# Patient Record
Sex: MALE | Race: WHITE | NOT HISPANIC OR LATINO | Employment: UNEMPLOYED | URBAN - METROPOLITAN AREA
[De-identification: names, ages, dates, MRNs, and addresses within clinical notes are randomized per-mention and may not be internally consistent; named-entity substitution may affect disease eponyms.]

---

## 2017-12-06 ENCOUNTER — ALLSCRIPTS OFFICE VISIT (OUTPATIENT)
Dept: OTHER | Facility: OTHER | Age: 57
End: 2017-12-06

## 2017-12-06 DIAGNOSIS — Z00.00 ENCOUNTER FOR GENERAL ADULT MEDICAL EXAMINATION WITHOUT ABNORMAL FINDINGS: ICD-10-CM

## 2017-12-06 DIAGNOSIS — E29.1 TESTICULAR HYPOFUNCTION: ICD-10-CM

## 2017-12-06 DIAGNOSIS — E78.00 PURE HYPERCHOLESTEROLEMIA: ICD-10-CM

## 2017-12-12 ENCOUNTER — TRANSCRIBE ORDERS (OUTPATIENT)
Dept: LAB | Facility: CLINIC | Age: 57
End: 2017-12-12

## 2017-12-12 ENCOUNTER — APPOINTMENT (OUTPATIENT)
Dept: LAB | Facility: CLINIC | Age: 57
End: 2017-12-12
Payer: MEDICARE

## 2017-12-12 ENCOUNTER — GENERIC CONVERSION - ENCOUNTER (OUTPATIENT)
Dept: OTHER | Facility: OTHER | Age: 57
End: 2017-12-12

## 2017-12-12 DIAGNOSIS — E29.1 TESTICULAR HYPOFUNCTION: ICD-10-CM

## 2017-12-12 DIAGNOSIS — E78.00 PURE HYPERCHOLESTEROLEMIA: ICD-10-CM

## 2017-12-12 DIAGNOSIS — Z00.00 ENCOUNTER FOR GENERAL ADULT MEDICAL EXAMINATION WITHOUT ABNORMAL FINDINGS: ICD-10-CM

## 2017-12-12 LAB
ALBUMIN SERPL BCP-MCNC: 4 G/DL (ref 3.5–5)
ALP SERPL-CCNC: 78 U/L (ref 46–116)
ALT SERPL W P-5'-P-CCNC: 32 U/L (ref 12–78)
ANION GAP SERPL CALCULATED.3IONS-SCNC: 5 MMOL/L (ref 4–13)
AST SERPL W P-5'-P-CCNC: 20 U/L (ref 5–45)
BILIRUB SERPL-MCNC: 0.95 MG/DL (ref 0.2–1)
BUN SERPL-MCNC: 13 MG/DL (ref 5–25)
CALCIUM SERPL-MCNC: 8.9 MG/DL (ref 8.3–10.1)
CHLORIDE SERPL-SCNC: 103 MMOL/L (ref 100–108)
CHOLEST SERPL-MCNC: 259 MG/DL (ref 50–200)
CO2 SERPL-SCNC: 29 MMOL/L (ref 21–32)
CREAT SERPL-MCNC: 1.03 MG/DL (ref 0.6–1.3)
GFR SERPL CREATININE-BSD FRML MDRD: 81 ML/MIN/1.73SQ M
GLUCOSE P FAST SERPL-MCNC: 100 MG/DL (ref 65–99)
HDLC SERPL-MCNC: 35 MG/DL (ref 40–60)
LDLC SERPL CALC-MCNC: 197 MG/DL (ref 0–100)
POTASSIUM SERPL-SCNC: 3.7 MMOL/L (ref 3.5–5.3)
PROT SERPL-MCNC: 7.6 G/DL (ref 6.4–8.2)
SODIUM SERPL-SCNC: 137 MMOL/L (ref 136–145)
TESTOST SERPL-MCNC: 356 NG/DL (ref 95–948)
TRIGL SERPL-MCNC: 137 MG/DL
TSH SERPL DL<=0.05 MIU/L-ACNC: 1.07 UIU/ML (ref 0.36–3.74)

## 2017-12-12 PROCEDURE — 84403 ASSAY OF TOTAL TESTOSTERONE: CPT

## 2017-12-12 PROCEDURE — 80061 LIPID PANEL: CPT

## 2017-12-12 PROCEDURE — 80053 COMPREHEN METABOLIC PANEL: CPT

## 2017-12-12 PROCEDURE — 36415 COLL VENOUS BLD VENIPUNCTURE: CPT

## 2017-12-12 PROCEDURE — 84443 ASSAY THYROID STIM HORMONE: CPT

## 2018-01-13 NOTE — PROGRESS NOTES
Assessment    1  Encounter for preventive health examination (V70 0) (Z00 00)    Plan  Health Maintenance, Testicular hypogonadism    · (1) COMPREHENSIVE METABOLIC PANEL; Status:Active; Requested for:84Oqy1989;    · (1) LIPID PANEL, FASTING; Status:Active; Requested for:41Pnp6841;    · (1) TESTOSTERONE; Status:Active; Requested for:99Zqg3954;   Hypercholesterolemia    · Vytorin 10-10 MG Oral Tablet; 1 every day    Discussion/Summary  Impression: health maintenance visit  Currently, he eats a poor diet and has an inadequate exercise regimen  Colorectal cancer screening: colorectal cancer screening is current  The patient declines immunizations and UTD with PCV - 4 y ago by pulmonologist, declined Adacel  Rto prn  The treatment plan was reviewed with the patient/guardian  The patient/guardian understands and agrees with the treatment plan      Chief Complaint  Annual PE  ksd,cma      History of Present Illness  HM, Adult Male: The patient is being seen for a health maintenance evaluation  The last health maintenance visit was 2 year(s) ago  General Health: The patient's health since the last visit is described as good  He has regular dental visits  He complains of vision problems  Vision care includes wearing glasses and an eye examination more than a year ago  He denies hearing loss  Immunizations status: not up to date The patient needs the following immunization(s): tetanus vaccine  Lifestyle:  He consumes a diverse and healthy diet  He does not have any weight concerns  He does not exercise regularly  He does not use tobacco  He denies alcohol use  He denies drug use  Screening:      Review of Systems    Constitutional: No fever or chills, feels well, no tiredness, no recent weight gain or weight loss  Eyes: No complaints of eye pain, no red eyes, no discharge from eyes, no itchy eyes     ENT: no complaints of earache, no hearing loss, no nosebleeds, no nasal discharge, no sore throat, no hoarseness  Cardiovascular: No complaints of slow heart rate, no fast heart rate, no chest pain, no palpitations, no leg claudication, no lower extremity  Respiratory: No complaints of shortness of breath, no wheezing, no cough, no SOB on exertion, no orthopnea or PND  Gastrointestinal: No complaints of abdominal pain, no constipation, no nausea or vomiting, no diarrhea or bloody stools  Genitourinary: No complaints of dysuria, no incontinence, no hesitancy, no nocturia, no genital lesion, no testicular pain  Musculoskeletal: No complaints of arthralgia, no myalgias, no joint swelling or stiffness, no limb pain or swelling  Integumentary: No complaints of skin rash or skin lesions, no itching, no skin wound, no dry skin  Neurological: No compliants of headache, no confusion, no convulsions, no numbness or tingling, no dizziness or fainting, no limb weakness, no difficulty walking  Psychiatric: Is not suicidal, no sleep disturbances, no anxiety or depression, no change in personality, no emotional problems  Endocrine: No complaints of proptosis, no hot flashes, no muscle weakness, no erectile dysfunction, no deepening of the voice, no feelings of weakness  Hematologic/Lymphatic: No complaints of swollen glands, no swollen glands in the neck, does not bleed easily, no easy bruising  Active Problems    1  Asthma (493 90) (J45 909)   2  Benign prostatic hypertrophy without urinary obstruction (600 00) (N40 0)   3  Encounter for prostate cancer screening (V76 44) (Z12 5)   4  Encounter for screening colonoscopy (V76 51) (Z12 11)   5   Hypercholesterolemia (272 0) (E78 00)    Surgical History    · History of Knee Arthroplasty   · History of Sinus Surgery    Family History  Mother    · Family history of Dementia   · Family history of Kidney disease  Father    · Family history of Dementia   · Family history of Rheumatic disease   · Family history of Rheumatism    Social History    · Former smoker (V15 82) (O19 353)    Current Meds   1  ProAir  (90 Base) MCG/ACT Inhalation Aerosol Solution; 2  PUFFS Q 4-6   HOURS PRN  CLIFF;   Therapy: 60LLN9306 to (Last Rx:92Xwe8944)  Requested for: 80Rsi5962 Ordered   2  Pulmicort Flexhaler 180 MCG/ACT Inhalation Aerosol Powder Breath Activated; two puffs   bid; Therapy: 85RXM0107 to (Last Rx:58Bav0526) Ordered   3  Vytorin 10-10 MG Oral Tablet; 1 every day; Therapy: 17OGV8251 to (Last Rx:77Fkg7912)  Requested for: 76Zit2678 Ordered    Allergies    1  No Known Drug Allergies    Vitals   Recorded: 90ZKT2961 01:10PM   Temperature 97 6 F   Heart Rate 72   Respiration 18   Systolic 875   Diastolic 66   Height 6 ft    Weight 210 lb    BMI Calculated 28 48   BSA Calculated 2 18     Physical Exam    Constitutional   General appearance: No acute distress, well appearing and well nourished  Eyes   Conjunctiva and lids: No erythema, swelling or discharge  Ears, Nose, Mouth, and Throat   Otoscopic examination: Tympanic membranes translucent with normal light reflex  Canals patent without erythema  Neck   Neck: Supple, symmetric, trachea midline, no masses  Thyroid: Normal, no thyromegaly  Pulmonary   Respiratory effort: No increased work of breathing or signs of respiratory distress  Auscultation of lungs: Clear to auscultation  Cardiovascular   Auscultation of heart: Normal rate and rhythm, normal S1 and S2, no murmurs  Examination of extremities for edema and/or varicosities: Normal     Abdomen   Abdomen: Non-tender, no masses  Lymphatic   Palpation of lymph nodes in neck: No lymphadenopathy  Musculoskeletal   Gait and station: Normal     Inspection/palpation of joints, bones, and muscles: Normal     Muscle strength/tone: Normal     Skin   Skin and subcutaneous tissue: Normal without rashes or lesions  Neurologic   Cranial nerves: Cranial nerves 2-12 intact      Psychiatric   Judgment and insight: Normal     Mood and affect: Normal  Results/Data  PHQ-9 Adult Depression Screening 83Gpj0373 01:15PM User, Dre     Test Name Result Flag Reference   PHQ-9 Adult Depression Score 6     Over the last two weeks, how often have you been bothered by any of the following problems? Little interest or pleasure in doing things: Several days - 1  Feeling down, depressed, or hopeless: Several days - 1  Trouble falling or staying asleep, or sleeping too much: Several days - 1  Feeling tired or having little energy: More than half the days - 2  Poor appetite or over eating: Not at all - 0  Feeling bad about yourself - or that you are a failure or have let yourself or your family down: Not at all - 0  Trouble concentrating on things, such as reading the newspaper or watching television: Several days - 1  Moving or speaking so slowly that other people could have noticed  Or the opposite -  being so fidgety or restless that you have been moving around a lot more than usual: Not at all - 0  Thoughts that you would be better off dead, or of hurting yourself in some way: Not at all - 0   PHQ-9 Adult Depression Screening Negative     PHQ-9 Difficulty Level Somewhat difficult     PHQ-9 Severity Mild Depression         Health Management  Encounter for screening colonoscopy   COLONOSCOPY; every 10 years; Last 39URK9491; Next Due: 29BPD9293;  Active    Signatures   Electronically signed by : TEJAS Roche ; Dec  6 2016  1:39PM EST                       (Author)

## 2018-01-15 NOTE — MISCELLANEOUS
Provider Comments  Provider Comments:   Wesley Valerio SPOKE W/WIFE WHO STATES PATIENT IS ON THE [facility] WHEN ARRIVES      Signatures   Electronically signed by : Beauford Felty, M D ; Nov 14 2016  9:52AM EST                       (Author)

## 2018-01-23 VITALS
WEIGHT: 205 LBS | HEART RATE: 72 BPM | RESPIRATION RATE: 16 BRPM | SYSTOLIC BLOOD PRESSURE: 120 MMHG | DIASTOLIC BLOOD PRESSURE: 80 MMHG | HEIGHT: 72 IN | BODY MASS INDEX: 27.77 KG/M2 | TEMPERATURE: 98 F

## 2018-01-23 NOTE — RESULT NOTES
Verified Results  (1) COMPREHENSIVE METABOLIC PANEL 87EMD8536 11:61XP Ana Workman Order Number: [id number]     Test Name Result Flag Reference   SODIUM 137 mmol/L  136-145   POTASSIUM 3 7 mmol/L  3 5-5 3   CHLORIDE 103 mmol/L  100-108   CARBON DIOXIDE 29 mmol/L  21-32   ANION GAP (CALC) 5 mmol/L  4-13   BLOOD UREA NITROGEN 13 mg/dL  5-25   CREATININE 1 03 mg/dL  0 60-1 30   Standardized to IDMS reference method   CALCIUM 8 9 mg/dL  8 3-10 1   BILI, TOTAL 0 95 mg/dL  0 20-1 00   ALK PHOSPHATAS 78 U/L     ALT (SGPT) 32 U/L  12-78   Specimen collection should occur prior to Sulfasalazine and/or Sulfapyridine administration due to the potential for falsely depressed results  AST(SGOT) 20 U/L  5-45   Specimen collection should occur prior to Sulfasalazine administration due to the potential for falsely depressed results  ALBUMIN 4 0 g/dL  3 5-5 0   TOTAL PROTEIN 7 6 g/dL  6 4-8 2   eGFR 81 ml/min/1 73sq m     National Kidney Disease Education Program recommendations are as follows:  GFR calculation is accurate only with a steady state creatinine  Chronic Kidney disease less than 60 ml/min/1 73 sq  meters  Kidney failure less than 15 ml/min/1 73 sq  meters  GLUCOSE FASTING 100 mg/dL H 65-99   Specimen collection should occur prior to Sulfasalazine administration due to the potential for falsely depressed results  Specimen collection should occur prior to Sulfapyridine administration due to the potential for falsely elevated results  (1) LIPID PANEL, FASTING 78Dtc9975 09:32AM Sharlette Epley    Order Number: [id number]     Test Name Result Flag Reference   CHOLESTEROL 259 mg/dL H    HDL,DIRECT 35 mg/dL L 40-60   Specimen collection should occur prior to Metamizole administration due to the potential for falsley depressed results     LDL CHOLESTEROL CALCULATED 197 mg/dL H 0-100   Triglyceride:        Normal <150 mg/dl   Borderline High 150-199 mg/dl   High 200-499 mg/dl   Very High >499 mg/dl      Cholesterol:       Desirable <200 mg/dl    Borderline High 200-239 mg/dl    High >239 mg/dl      HDL Cholesterol:       High>59 mg/dL    Low <41 mg/dL      This screening LDL is a calculated result  It does not have the accuracy of the Direct Measured LDL in the monitoring of patients with hyperlipidemia and/or statin therapy  Direct Measure LDL (NQV245) must be ordered separately in these patients  TRIGLYCERIDES 137 mg/dL  <=150   Specimen collection should occur prior to N-Acetylcysteine or Metamizole administration due to the potential for falsely depressed results  (1) TESTOSTERONE 23Gqb1976 09:32AM Taylor Peng   TW Order Number: [id number]     Test Name Result Flag Reference   TESTOSTERONE 356 0 ng/dL     *New Reference Range*     (1) TSH 96Kxu5123 09:32AM Taylor Peng   TW Order Number: [id number]     Test Name Result Flag Reference   TSH 1 070 uIU/mL  0 358-3 740   Patients undergoing fluorescein dye angiography may retain small amounts of fluorescein in the body for 48-72 hours post procedure  Samples containing fluorescein can produce falsely depressed TSH values  If the patient had this procedure,a specimen should be resubmitted post fluorescein clearance

## 2018-01-23 NOTE — PROGRESS NOTES
Assessment    1  Encounter for preventive health examination (V70 0) (Z00 00)   2  Hypercholesterolemia (272 0) (E78 00)   3  Testicular hypogonadism (257 2) (E29 1)    Plan  Health Maintenance    · Adacel 5-2-15 5 LF-MCG/0 5 Intramuscular Suspension  Health Maintenance, Hypercholesterolemia    · (1) COMPREHENSIVE METABOLIC PANEL; Status:Active; Requested for:80Qnd5722;    · (1) LIPID PANEL, FASTING; Status:Active; Requested for:68Aya4443;   Hypercholesterolemia    · Ezetimibe-Simvastatin 10-10 MG Oral Tablet (Vytorin); 1 every day   · (1) TSH; Status:Active; Requested for:39Upo0237; Testicular hypogonadism    · (1) TESTOSTERONE; Status:Active; Requested for:05Wpl4150;     Discussion/Summary  health maintenance visit Currently, he eats a healthy diet and has an inadequate exercise regimen  Prostate cancer screening: PSA is not indicated  Colorectal cancer screening: colorectal cancer screening is current  Screening lab work includes glucose and lipid profile  The patient declines immunizations and - Adacel  Advice and education were given regarding nutrition, aerobic exercise and weight bearing exercise  Patient discussion: discussed with the patient  H/o low testosterone in the past - never took med that was Rx - wants to recheck   rto prn  Chief Complaint  Patient presents for annual PE  Declines Tdap  nil/lpn      History of Present Illness  HM, Adult Male: The patient is being seen for a health maintenance evaluation  The last health maintenance visit was year(s) ago  General Health: He has regular dental visits  He complains of vision problems  Vision care includes wearing glasses and an eye examination more than a year ago  He denies hearing loss  Immunizations status: not up to date  Lifestyle:  He consumes a diverse and healthy diet  He does not have any weight concerns  He exercises regularly  He does not use tobacco  He denies alcohol use  He denies drug use     Screening:      Review of Systems    Constitutional: No fever or chills, feels well, no tiredness, no recent weight gain or weight loss  Eyes: No complaints of eye pain, no red eyes, no discharge from eyes, no itchy eyes  ENT: no complaints of earache, no hearing loss, no nosebleeds, no nasal discharge, no sore throat, no hoarseness  Cardiovascular: No complaints of slow heart rate, no fast heart rate, no chest pain, no palpitations, no leg claudication, no lower extremity  Respiratory: No complaints of shortness of breath, no wheezing, no cough, no SOB on exertion, no orthopnea or PND  Gastrointestinal: No complaints of abdominal pain, no constipation, no nausea or vomiting, no diarrhea or bloody stools  Genitourinary: No complaints of dysuria, no incontinence, no hesitancy, no nocturia, no genital lesion, no testicular pain  Musculoskeletal: No complaints of arthralgia, no myalgias, no joint swelling or stiffness, no limb pain or swelling  Integumentary: No complaints of skin rash or skin lesions, no itching, no skin wound, no dry skin  Neurological: No compliants of headache, no confusion, no convulsions, no numbness or tingling, no dizziness or fainting, no limb weakness, no difficulty walking  Psychiatric: Is not suicidal, no sleep disturbances, no anxiety or depression, no change in personality, no emotional problems  Endocrine: No complaints of proptosis, no hot flashes, no muscle weakness, no erectile dysfunction, no deepening of the voice, no feelings of weakness  Hematologic/Lymphatic: No complaints of swollen glands, no swollen glands in the neck, does not bleed easily, no easy bruising  Active Problems    1  Asthma (493 90) (J45 909)   2  Benign prostatic hypertrophy without urinary obstruction (600 00) (N40 0)   3  Encounter for prostate cancer screening (V76 44) (Z12 5)   4  Encounter for screening colonoscopy (V76 51) (Z12 11)   5  Hypercholesterolemia (272 0) (E78 00)   6   Testicular hypogonadism (257 2) (E29 1)    Past Medical History    · History of Acute bronchitis (466 0) (J20 9)   · History of Cough (786 2) (R05)   · History of fatigue (V13 89) (S96 857)   · History of Near syncope (780 2) (R55)    Surgical History    · History of Knee Arthroscopy (Therapeutic)   · History of Sinus Surgery    Family History  Mother    · Family history of Dementia   · Family history of Kidney disease  Father    · Family history of Dementia   · Family history of Rheumatic disease   · Family history of Rheumatism    Social History    · Former smoker (F10 02) (Y70 254)    Current Meds   1  ProAir  (90 Base) MCG/ACT Inhalation Aerosol Solution; 2  PUFFS Q 4-6   HOURS PRN  CLIFF;   Therapy: 87SAP0414 to (Last Rx:96Gze1884)  Requested for: 56Fda6756 Ordered   2  Pulmicort Flexhaler 180 MCG/ACT Inhalation Aerosol Powder Breath Activated; two puffs   bid; Therapy: 73YYK9758 to (Last Rx:72Lfa9129) Ordered   3  Vytorin 10-10 MG Oral Tablet; 1 every day; Therapy: 81RWB1321 to (Last Rx:99Wpw5926)  Requested for: 09LWV1434 Ordered    Allergies    1  No Known Drug Allergies    Vitals   Recorded: 14KHO4023 03:09PM   Temperature 98 F   Heart Rate 72   Respiration Quality Normal   Respiration 16   Systolic 634   Diastolic 80   Height 6 ft    Weight 205 lb    BMI Calculated 27 8   BSA Calculated 2 15     Physical Exam    Constitutional   General appearance: No acute distress, well appearing and well nourished  Head and Face   Head and face: Normal     Eyes   Conjunctiva and lids: No erythema, swelling or discharge  Pupils and irises: Equal, round, reactive to light  Ears, Nose, Mouth, and Throat   Otoscopic examination: Tympanic membranes translucent with normal light reflex  Canals patent without erythema  Oropharynx: Normal with no erythema, edema, exudate or lesions  Neck   Neck: Supple, symmetric, trachea midline, no masses  Thyroid: Normal, no thyromegaly      Pulmonary   Respiratory effort: No increased work of breathing or signs of respiratory distress  Auscultation of lungs: Clear to auscultation  Cardiovascular   Auscultation of heart: Normal rate and rhythm, normal S1 and S2, no murmurs  Examination of extremities for edema and/or varicosities: Normal     Abdomen   Abdomen: Non-tender, no masses  Genitourinary declined  Lymphatic   Palpation of lymph nodes in neck: No lymphadenopathy  Musculoskeletal   Gait and station: Normal     Inspection/palpation of joints, bones, and muscles: Normal     Muscle strength/tone: Normal     Skin   Skin and subcutaneous tissue: Normal without rashes or lesions  Neurologic   Cranial nerves: Cranial nerves 2-12 intact  Psychiatric   Judgment and insight: Normal     Mood and affect: Normal        Results/Data  PHQ-2 Adult Depression Screening 52Jtk4567 03:12PM User, s     Test Name Result Flag Reference   PHQ-2 Adult Depression Score 0     Over the last two weeks, how often have you been bothered by any of the following problems? Little interest or pleasure in doing things: Not at all - 0  Feeling down, depressed, or hopeless: Not at all - 0   PHQ-2 Adult Depression Screening Negative         Health Management  Encounter for screening colonoscopy   COLONOSCOPY; every 10 years; Last 51OTF6881; Next Due: 42JIZ7627;  Active    Signatures   Electronically signed by : TEJAS Vargas ; Dec  6 2017  5:01PM EST                       (Author)

## 2018-03-13 DIAGNOSIS — E78.00 PURE HYPERCHOLESTEROLEMIA: ICD-10-CM

## 2018-03-15 ENCOUNTER — TRANSCRIBE ORDERS (OUTPATIENT)
Dept: LAB | Facility: CLINIC | Age: 58
End: 2018-03-15

## 2018-03-15 ENCOUNTER — APPOINTMENT (OUTPATIENT)
Dept: LAB | Facility: CLINIC | Age: 58
End: 2018-03-15
Payer: MEDICARE

## 2018-03-15 DIAGNOSIS — E78.00 PURE HYPERCHOLESTEROLEMIA: ICD-10-CM

## 2018-03-15 LAB
ALBUMIN SERPL BCP-MCNC: 3.9 G/DL (ref 3.5–5)
ALP SERPL-CCNC: 77 U/L (ref 46–116)
ALT SERPL W P-5'-P-CCNC: 34 U/L (ref 12–78)
ANION GAP SERPL CALCULATED.3IONS-SCNC: 5 MMOL/L (ref 4–13)
AST SERPL W P-5'-P-CCNC: 20 U/L (ref 5–45)
BILIRUB SERPL-MCNC: 0.63 MG/DL (ref 0.2–1)
BUN SERPL-MCNC: 16 MG/DL (ref 5–25)
CALCIUM SERPL-MCNC: 8.8 MG/DL (ref 8.3–10.1)
CHLORIDE SERPL-SCNC: 105 MMOL/L (ref 100–108)
CHOLEST SERPL-MCNC: 259 MG/DL (ref 50–200)
CO2 SERPL-SCNC: 30 MMOL/L (ref 21–32)
CREAT SERPL-MCNC: 1.02 MG/DL (ref 0.6–1.3)
GFR SERPL CREATININE-BSD FRML MDRD: 82 ML/MIN/1.73SQ M
GLUCOSE P FAST SERPL-MCNC: 104 MG/DL (ref 65–99)
HDLC SERPL-MCNC: 39 MG/DL (ref 40–60)
LDLC SERPL CALC-MCNC: 193 MG/DL (ref 0–100)
POTASSIUM SERPL-SCNC: 4.1 MMOL/L (ref 3.5–5.3)
PROT SERPL-MCNC: 7.2 G/DL (ref 6.4–8.2)
SODIUM SERPL-SCNC: 140 MMOL/L (ref 136–145)
TRIGL SERPL-MCNC: 136 MG/DL

## 2018-03-15 PROCEDURE — 80061 LIPID PANEL: CPT

## 2018-03-15 PROCEDURE — 80053 COMPREHEN METABOLIC PANEL: CPT

## 2018-03-15 PROCEDURE — 36415 COLL VENOUS BLD VENIPUNCTURE: CPT

## 2018-03-16 ENCOUNTER — OFFICE VISIT (OUTPATIENT)
Dept: FAMILY MEDICINE CLINIC | Facility: CLINIC | Age: 58
End: 2018-03-16
Payer: MEDICARE

## 2018-03-16 VITALS
BODY MASS INDEX: 26.95 KG/M2 | RESPIRATION RATE: 18 BRPM | DIASTOLIC BLOOD PRESSURE: 80 MMHG | WEIGHT: 210 LBS | HEART RATE: 76 BPM | TEMPERATURE: 98.1 F | SYSTOLIC BLOOD PRESSURE: 130 MMHG | HEIGHT: 74 IN

## 2018-03-16 DIAGNOSIS — J45.909 UNCOMPLICATED ASTHMA, UNSPECIFIED ASTHMA SEVERITY, UNSPECIFIED WHETHER PERSISTENT: ICD-10-CM

## 2018-03-16 DIAGNOSIS — E78.00 HYPERCHOLESTEROLEMIA: Primary | ICD-10-CM

## 2018-03-16 PROCEDURE — 99214 OFFICE O/P EST MOD 30 MIN: CPT | Performed by: FAMILY MEDICINE

## 2018-03-16 RX ORDER — ROSUVASTATIN CALCIUM 10 MG/1
10 TABLET, COATED ORAL DAILY
Qty: 30 TABLET | Refills: 3 | Status: SHIPPED | OUTPATIENT
Start: 2018-03-16 | End: 2021-01-19 | Stop reason: SDUPTHER

## 2018-03-16 RX ORDER — EZETIMIBE AND SIMVASTATIN 10; 10 MG/1; MG/1
TABLET ORAL DAILY
COMMUNITY
Start: 2015-06-15 | End: 2018-03-16 | Stop reason: CLARIF

## 2018-03-16 NOTE — PATIENT INSTRUCTIONS
Heart Healthy Diet   WHAT YOU NEED TO KNOW:   A heart healthy diet is an eating plan low in total fat, unhealthy fats, and sodium (salt)  A heart healthy diet helps decrease your risk for heart disease and stroke  Limit the amount of fat you eat to 25% to 35% of your total daily calories  Limit sodium to less than 2,300 mg each day  DISCHARGE INSTRUCTIONS:   Healthy fats:  Healthy fats can help improve cholesterol levels  The risk for heart disease is decreased when cholesterol levels are normal  Choose healthy fats, such as the following:  · Unsaturated fat  is found in foods such as soybean, canola, olive, corn, and safflower oils  It is also found in soft tub margarine that is made with liquid vegetable oil  · Omega-3 fat  is found in certain fish, such as salmon, tuna, and trout, and in walnuts and flaxseed  Unhealthy fats:  Unhealthy fats can cause unhealthy cholesterol levels in your blood and increase your risk of heart disease  Limit unhealthy fats, such as the following:  · Cholesterol  is found in animal foods, such as eggs and lobster, and in dairy products made from whole milk  Limit cholesterol to less than 300 milligrams (mg) each day  You may need to limit cholesterol to 200 mg each day if you have heart disease  · Saturated fat  is found in meats, such as pedraza and hamburger  It is also found in chicken or turkey skin, whole milk, and butter  Limit saturated fat to less than 7% of your total daily calories  Limit saturated fat to less than 6% if you have heart disease or are at increased risk for it  · Trans fat  is found in packaged foods, such as potato chips and cookies  It is also in hard margarine, some fried foods, and shortening  Avoid trans fats as much as possible    Heart healthy foods and drinks to include:  Ask your dietitian or healthcare provider how many servings to have from each of the following food groups:  · Grains:      ¨ Whole-wheat breads, cereals, and pastas, and brown rice    ¨ Low-fat, low-sodium crackers and chips    · Vegetables:      ¨ Broccoli, green beans, green peas, and spinach    ¨ Collards, kale, and lima beans    ¨ Carrots, sweet potatoes, tomatoes, and peppers    ¨ Canned vegetables with no salt added    · Fruits:      ¨ Bananas, peaches, pears, and pineapple    ¨ Grapes, raisins, and dates    ¨ Oranges, tangerines, grapefruit, orange juice, and grapefruit juice    ¨ Apricots, mangoes, melons, and papaya    ¨ Raspberries and strawberries    ¨ Canned fruit with no added sugar    · Low-fat dairy products:      ¨ Nonfat (skim) milk, 1% milk, and low-fat almond, cashew, or soy milks fortified with calcium    ¨ Low-fat cheese, regular or frozen yogurt, and cottage cheese    · Meats and proteins , such as lean cuts of beef and pork (loin, leg, round), skinless chicken and turkey, legumes, soy products, egg whites, and nuts  Foods and drinks to limit or avoid:  Ask your dietitian or healthcare provider about these and other foods that are high in unhealthy fat, sodium, and sugar:  · Snack or packaged foods , such as frozen dinners, cookies, macaroni and cheese, and cereals with more than 300 mg of sodium per serving    · Canned or dry mixes  for cakes, soups, sauces, or gravies    · Vegetables with added sodium , such as instant potatoes, vegetables with added sauces, or regular canned vegetables    · Other foods high in sodium , such as ketchup, barbecue sauce, salad dressing, pickles, olives, soy sauce, and miso    · High-fat dairy foods  such as whole or 2% milk, cream cheese, or sour cream, and cheeses     · High-fat protein foods  such as high-fat cuts of beef (T-bone steaks, ribs), chicken or turkey with skin, and organ meats, such as liver    · Cured or smoked meats , such as hot dogs, pedraza, and sausage    · Unhealthy fats and oils , such as butter, stick margarine, shortening, and cooking oils such as coconut or palm oil    · Food and drinks high in sugar , such as soft drinks (soda), sports drinks, sweetened tea, candy, cake, cookies, pies, and doughnuts  Other diet guidelines to follow:   · Eat more foods containing omega-3 fats  Eat fish high in omega-3 fats at least 2 times a week  · Limit alcohol  Too much alcohol can damage your heart and raise your blood pressure  Women should limit alcohol to 1 drink a day  Men should limit alcohol to 2 drinks a day  A drink of alcohol is 12 ounces of beer, 5 ounces of wine, or 1½ ounces of liquor  · Choose low-sodium foods  High-sodium foods can lead to high blood pressure  Add little or no salt to food you prepare  Use herbs and spices in place of salt  · Eat more fiber  to help lower cholesterol levels  Eat at least 5 servings of fruits and vegetables each day  Eat 3 ounces of whole-grain foods each day  Legumes (beans) are also a good source of fiber  Lifestyle guidelines:   · Do not smoke  Nicotine and other chemicals in cigarettes and cigars can cause lung and heart damage  Ask your healthcare provider for information if you currently smoke and need help to quit  E-cigarettes or smokeless tobacco still contain nicotine  Talk to your healthcare provider before you use these products  · Exercise regularly  to help you maintain a healthy weight and improve your blood pressure and cholesterol levels  Ask your healthcare provider about the best exercise plan for you  Do not start an exercise program without asking your healthcare provider  Follow up with your healthcare provider as directed:  Write down your questions so you remember to ask them during your visits  © 2017 [employer] Information is for End User's use only and may not be sold, redistributed or otherwise used for commercial purposes  All illustrations and images included in CareNotes® are the copyrighted property of [employer]  or [employer]  The above information is an  only   It is not intended as medical advice for individual conditions or treatments  Talk to your doctor, nurse or pharmacist before following any medical regimen to see if it is safe and effective for you

## 2018-03-16 NOTE — PROGRESS NOTES
Subjective     Ashwini Jacob is here for follow up of elevated cholesterol  Compliance with treatment has been poor  The patient exercises frequently  Patient complains of muscle pain associated with his medications  -  Stopped Vytorin b/o muscle pain few m ago -  Trying to bring Lipids down on diet alone  -  Lipids as below  -  Also f/u on Asthma -  Needs a new pulmonologist referral since Dr Daugherty retired     The following portions of the patient's history were reviewed and updated as appropriate: allergies, current medications, past family history, past medical history, past social history, past surgical history and problem list     Review of Systems  Constitutional: negative  Respiratory: negative  Cardiovascular: negative  Gastrointestinal: negative  Genitourinary:negative  Musculoskeletal:negative  Neurological: negative  Objective     General appearance: alert and oriented, in no acute distress  Lungs: clear to auscultation bilaterally  Heart: regular rate and rhythm, S1, S2 normal, no murmur, click, rub or gallop  Neurologic: Grossly normal     Lab Review  Lab Results   Component Value Date    CHOL 259 (H) 03/15/2018    CHOL 259 (H) 12/12/2017    CHOL 149 12/20/2016    TRIG 136 03/15/2018    TRIG 137 12/12/2017    TRIG 100 12/20/2016    HDL 39 (L) 03/15/2018    HDL 35 (L) 12/12/2017    HDL 41 12/20/2016       Assessment/Plan     Dyslipidemia under poor control  1  Continue dietary measures  2  Continue regular exercise  3  Lipid-lowering medications: Crestor 5 mg   4  Follow up in 3 months

## 2018-09-27 ENCOUNTER — OFFICE VISIT (OUTPATIENT)
Dept: FAMILY MEDICINE CLINIC | Facility: CLINIC | Age: 58
End: 2018-09-27
Payer: MEDICARE

## 2018-09-27 VITALS
HEIGHT: 74 IN | TEMPERATURE: 97.4 F | BODY MASS INDEX: 27.21 KG/M2 | HEART RATE: 80 BPM | RESPIRATION RATE: 16 BRPM | DIASTOLIC BLOOD PRESSURE: 90 MMHG | WEIGHT: 212 LBS | SYSTOLIC BLOOD PRESSURE: 138 MMHG

## 2018-09-27 DIAGNOSIS — R05.9 COUGH: ICD-10-CM

## 2018-09-27 DIAGNOSIS — J01.90 ACUTE NON-RECURRENT SINUSITIS, UNSPECIFIED LOCATION: Primary | ICD-10-CM

## 2018-09-27 PROCEDURE — 99213 OFFICE O/P EST LOW 20 MIN: CPT | Performed by: FAMILY MEDICINE

## 2018-09-27 RX ORDER — GUAIFENESIN AND CODEINE PHOSPHATE 100; 10 MG/5ML; MG/5ML
5 SOLUTION ORAL 3 TIMES DAILY PRN
Qty: 120 ML | Refills: 0 | Status: SHIPPED | OUTPATIENT
Start: 2018-09-27 | End: 2018-11-05

## 2018-09-27 RX ORDER — AMOXICILLIN AND CLAVULANATE POTASSIUM 875; 125 MG/1; MG/1
1 TABLET, FILM COATED ORAL EVERY 12 HOURS SCHEDULED
Qty: 14 TABLET | Refills: 0 | Status: SHIPPED | OUTPATIENT
Start: 2018-09-27 | End: 2018-10-04

## 2018-09-27 NOTE — PROGRESS NOTES
Andressa Aguero 1960 male MRN: [id number]    [facility] ACUTE OFFICE VISIT  [facility]      ASSESSMENT/PLAN  Andressa Aguero is a 56 y o  male presents to the office for    Sinusitis with cough  - Started on Augmentin  BID x 7 days   - Encourage hydration and rest  If fever develops > 100 4 to take tylenol as needed  - Cough: patient insisted that the patient usually gets codeine cough syrup from his PCP  Prescription given today  - If symptoms worsen to please contact the office   -will like the patient to be seen by his PCP in 5 days given his significant history of lung pneumothorax  NO signs of pneumonia at this time  -     amoxicillin-clavulanate (AUGMENTIN) 875-125 mg per tablet; Take 1 tablet by mouth every 12 (twelve) hours for 7 days  -     guaifenesin-codeine (GUAIFENESIN AC) 100-10 MG/5ML liquid; Take 5 mL by mouth 3 (three) times a day       Disposition:  Return to the office next Wednesday if symptoms do not improve  Future Appointments  Date Time Provider Paige Leonardo   10/3/2018 9:15 AM Jamar Trejo MD [facility]          SUBJECTIVE  CC: Cough (chest and sinus congestion  ) and Generalized Body Aches      HPI:  Andressa Aguero is a 56 y o  male who presents for 1 week of cough, chest tightness, left ear pain and left facial tenderness, sinus congestion with generalized body aches  Patient states that he has not felt febrile  He feels that his symptoms are only worsening  Patient would like us to know that he has a history of a pneumothorax with complications and is at risk of pneumonia and is concerned for his breathing  Review of Systems   Constitutional: Positive for chills  Negative for activity change, appetite change, fatigue and fever  HENT: Positive for ear pain (Left ear pain), sinus pain and sinus pressure  Negative for congestion  Eyes: Negative for visual disturbance  Respiratory: Positive for cough and chest tightness  Negative for shortness of breath  Cardiovascular: Negative for chest pain and leg swelling  Gastrointestinal: Negative for abdominal distention, abdominal pain, constipation, diarrhea, nausea and vomiting  Musculoskeletal: Positive for myalgias  Allergic/Immunologic: Negative for environmental allergies  Neurological: Negative for dizziness, light-headedness and headaches  All other systems reviewed and are negative        Historical Information   The patient history was reviewed as follows:  Past Medical History:   Diagnosis Date    Near syncope          Past Surgical History:   Procedure Laterality Date    ARTHROSCOPY KNEE      SINUS SURGERY       Family History   Problem Relation Age of Onset    Dementia Mother     Kidney disease Mother     Dementia Father     Rheumatologic disease Father     Other Father         rheumatism      Social History   History   Alcohol use Not on file     History   Drug use: Unknown     History   Smoking Status    Never Smoker   Smokeless Tobacco    Never Used       Medications:     Current Outpatient Prescriptions:     esomeprazole (NexIUM) 20 mg capsule, Take 20 mg by mouth daily, Disp: , Rfl: 0    rosuvastatin (CRESTOR) 10 MG tablet, Take 1 tablet (10 mg total) by mouth daily, Disp: 30 tablet, Rfl: 3    VENTOLIN  (90 Base) MCG/ACT inhaler, 2 puffs 4 (four) times a day as needed, Disp: , Rfl: 0    amoxicillin-clavulanate (AUGMENTIN) 875-125 mg per tablet, Take 1 tablet by mouth every 12 (twelve) hours for 7 days, Disp: 14 tablet, Rfl: 0    guaifenesin-codeine (GUAIFENESIN AC) 100-10 MG/5ML liquid, Take 5 mL by mouth 3 (three) times a day as needed for cough, Disp: 120 mL, Rfl: 0    No Known Allergies    OBJECTIVE  Vitals:   Vitals:    09/27/18 1653   BP: 138/90   BP Location: Left arm   Patient Position: Sitting   Cuff Size: Standard   Pulse: 80   Resp: 16   Temp: (!) 97 4 °F (36 3 °C)   Weight: 96 2 kg (212 lb)   Height: 6' 2" (1 88 m)         Physical Exam   Constitutional: He is oriented to person, place, and time  Vital signs are normal  He appears well-developed and well-nourished  HENT:   Head: Normocephalic and atraumatic  Right Ear: Hearing and external ear normal  Tympanic membrane is bulging  Left Ear: Hearing normal  There is swelling  Tympanic membrane is erythematous and bulging  Nose: Mucosal edema present  Mouth/Throat: Posterior oropharyngeal erythema present  Eyes: Pupils are equal, round, and reactive to light  Conjunctivae and EOM are normal    Neck: Normal range of motion  Neck supple  Cardiovascular: Normal rate, regular rhythm, S1 normal, S2 normal, normal heart sounds and intact distal pulses  No murmur heard  Pulmonary/Chest: Effort normal and breath sounds normal  No respiratory distress  He has no wheezes  Lung findings were normal no signs of crackles, rhonchi at this time  Abdominal: Soft  Bowel sounds are normal  He exhibits no distension  There is no tenderness  Musculoskeletal: Normal range of motion  He exhibits no edema  Neurological: He is alert and oriented to person, place, and time  He has normal strength  Skin: Skin is warm  No rash noted  Psychiatric: He has a normal mood and affect  His speech is normal and behavior is normal  Judgment and thought content normal    Vitals reviewed                   Ulises Marshall MD,   [facility]  9/27/2018

## 2018-10-29 ENCOUNTER — OFFICE VISIT (OUTPATIENT)
Dept: OBGYN CLINIC | Facility: CLINIC | Age: 58
End: 2018-10-29
Payer: MEDICARE

## 2018-10-29 ENCOUNTER — APPOINTMENT (OUTPATIENT)
Dept: RADIOLOGY | Facility: CLINIC | Age: 58
End: 2018-10-29
Payer: MEDICARE

## 2018-10-29 VITALS
BODY MASS INDEX: 27.09 KG/M2 | HEART RATE: 64 BPM | HEIGHT: 72 IN | WEIGHT: 200 LBS | DIASTOLIC BLOOD PRESSURE: 90 MMHG | SYSTOLIC BLOOD PRESSURE: 142 MMHG

## 2018-10-29 DIAGNOSIS — M25.511 RIGHT SHOULDER PAIN, UNSPECIFIED CHRONICITY: ICD-10-CM

## 2018-10-29 DIAGNOSIS — G89.29 CHRONIC RIGHT SHOULDER PAIN: Primary | ICD-10-CM

## 2018-10-29 DIAGNOSIS — M25.511 CHRONIC RIGHT SHOULDER PAIN: Primary | ICD-10-CM

## 2018-10-29 PROCEDURE — 73030 X-RAY EXAM OF SHOULDER: CPT

## 2018-10-29 PROCEDURE — 99204 OFFICE O/P NEW MOD 45 MIN: CPT | Performed by: ORTHOPAEDIC SURGERY

## 2018-10-29 RX ORDER — MOMETASONE FUROATE 50 UG/1
SPRAY, METERED NASAL
Refills: 1 | COMMUNITY
Start: 2018-10-26 | End: 2019-01-29

## 2018-10-29 NOTE — PROGRESS NOTES
Assessment/Plan:  1  Chronic right shoulder pain  XR shoulder 2+ vw right    MRI shoulder right wo contrast       Marc has right-sided shoulder pain concerning for rotator cuff tear  His weakness on examination is concerning for an underlying partial rotator cuff tear which seems to be bothering him for over a year  It looks like he has failed conservative treatment thus far with therapy and a cortisone injection  I would like to proceed with an MRI of his right shoulder at this time to evaluate for partial versus full-thickness tear of the rotator cuff  He will follow up after the MRI is complete to discuss treatment plan  Subjective:   Zaid Ronquillo is a 66 y o  male who presents for evaluation for right-sided shoulder pain  He reports a long history of right-sided shoulder discomfort and previous orthopedic evaluation  He saw all Dr Ilene Meyer, at [facility]  1 year ago with similar discomfort in the right shoulder  There was concern for partial rotator cuff tear based on his exam at that time  He was given a cortisone injection which helped for about a month or 2  He did home exercises of therapy at that time but she states did not help  He went back for a 2nd evaluation and an MRI of the shoulder was ordered  This was never completed and he did not follow up  He states over the past few months he has felt continued discomfort in the right shoulder with difficulty raising his arm overhead  He also feels weakness in the right shoulder compared to the left  He states the pain is a constant aching throbbing sensation which can become sharp and stabbing with attempted lifting overhead  It worsens with overhead movement or lifting to the side and improves with rest   The exercises for the rotator cuff have not seem to help him  Review of Systems   Constitutional: Negative for chills, fever and unexpected weight change  HENT: Negative for hearing loss, nosebleeds and sore throat  Eyes: Negative for pain, redness and visual disturbance  Respiratory: Negative for cough, shortness of breath and wheezing  Cardiovascular: Negative for chest pain, palpitations and leg swelling  Gastrointestinal: Negative for abdominal pain, nausea and vomiting  Endocrine: Negative for polyphagia and polyuria  Genitourinary: Negative for dysuria and hematuria  Musculoskeletal:        See HPI   Skin: Negative for rash and wound  Neurological: Negative for dizziness, numbness and headaches  Psychiatric/Behavioral: Negative for decreased concentration and suicidal ideas  The patient is not nervous/anxious  Past Medical History:   Diagnosis Date    Near syncope        Past Surgical History:   Procedure Laterality Date    ARTHROSCOPY KNEE      SINUS SURGERY         Family History   Problem Relation Age of Onset    Dementia Mother     Kidney disease Mother     Dementia Father     Rheumatologic disease Father     Other Father         rheumatism       Social History     Occupational History    Not on file       Social History Main Topics    Smoking status: Never Smoker    Smokeless tobacco: Never Used    Alcohol use Not on file    Drug use: Unknown    Sexual activity: Not on file         Current Outpatient Prescriptions:     esomeprazole (NexIUM) 20 mg capsule, Take 20 mg by mouth daily, Disp: , Rfl: 0    mometasone (NASONEX) 50 mcg/act nasal spray, instill 2 sprays into each nostril twice a day, Disp: , Rfl: 1    Montelukast Sodium (SINGULAIR PO), Take by mouth, Disp: , Rfl:     rosuvastatin (CRESTOR) 10 MG tablet, Take 1 tablet (10 mg total) by mouth daily, Disp: 30 tablet, Rfl: 3    VENTOLIN  (90 Base) MCG/ACT inhaler, 2 puffs 4 (four) times a day as needed, Disp: , Rfl: 0    guaifenesin-codeine (GUAIFENESIN AC) 100-10 MG/5ML liquid, Take 5 mL by mouth 3 (three) times a day as needed for cough (Patient not taking: Reported on 10/29/2018 ), Disp: 120 mL, Rfl: 0    No Known Allergies    Objective:  Vitals:    10/29/18 1100   BP: 142/90   Pulse: 64       Right Shoulder Exam     Tenderness   Right shoulder tenderness location: Mild trapezius tenderness  Range of Motion   Active Abduction:  150 abnormal   Passive Abduction:  170 normal   Extension: normal   Forward Flexion:  130 abnormal   External Rotation: normal   Internal Rotation 0 degrees:  Lumbar normal     Muscle Strength   Abduction: 4/5   Internal Rotation: 5/5   External Rotation: 4/5   Supraspinatus: 3/5   Subscapularis: 5/5   Biceps: 5/5     Tests   Drop Arm: negative  Hawkin's test: positive  Impingement: positive    Other   Erythema: absent  Sensation: normal  Pulse: present            Physical Exam   Constitutional: He is oriented to person, place, and time  He appears well-developed  HENT:   Head: Normocephalic and atraumatic  Eyes: Conjunctivae are normal    Neck: Neck supple  Cardiovascular: Intact distal pulses  Pulmonary/Chest: Effort normal    Neurological: He is alert and oriented to person, place, and time  Skin: Skin is warm and dry  Psychiatric: He has a normal mood and affect  His behavior is normal    Vitals reviewed  I have personally reviewed pertinent films in PACS and my interpretation is as follows: Three-view x-rays of the right shoulder demonstrate mild osteoarthritis of the glenohumeral joint with no sign of fracture

## 2018-11-05 ENCOUNTER — OFFICE VISIT (OUTPATIENT)
Dept: PULMONOLOGY | Facility: MEDICAL CENTER | Age: 58
End: 2018-11-05
Payer: MEDICARE

## 2018-11-05 VITALS
OXYGEN SATURATION: 98 % | WEIGHT: 209 LBS | RESPIRATION RATE: 12 BRPM | SYSTOLIC BLOOD PRESSURE: 122 MMHG | TEMPERATURE: 98 F | HEIGHT: 72 IN | HEART RATE: 64 BPM | DIASTOLIC BLOOD PRESSURE: 84 MMHG | BODY MASS INDEX: 28.31 KG/M2

## 2018-11-05 DIAGNOSIS — G25.81 RESTLESS LEG SYNDROME: ICD-10-CM

## 2018-11-05 DIAGNOSIS — J45.909 UNCOMPLICATED ASTHMA, UNSPECIFIED ASTHMA SEVERITY, UNSPECIFIED WHETHER PERSISTENT: ICD-10-CM

## 2018-11-05 DIAGNOSIS — G47.33 OSA (OBSTRUCTIVE SLEEP APNEA): ICD-10-CM

## 2018-11-05 DIAGNOSIS — Z23 ENCOUNTER FOR IMMUNIZATION: ICD-10-CM

## 2018-11-05 DIAGNOSIS — Z87.891 FORMER SMOKER: Primary | ICD-10-CM

## 2018-11-05 DIAGNOSIS — J45.40 MODERATE PERSISTENT ASTHMA, UNCOMPLICATED: ICD-10-CM

## 2018-11-05 PROCEDURE — 94010 BREATHING CAPACITY TEST: CPT | Performed by: INTERNAL MEDICINE

## 2018-11-05 PROCEDURE — 90686 IIV4 VACC NO PRSV 0.5 ML IM: CPT

## 2018-11-05 PROCEDURE — 99205 OFFICE O/P NEW HI 60 MIN: CPT | Performed by: INTERNAL MEDICINE

## 2018-11-05 PROCEDURE — G0008 ADMIN INFLUENZA VIRUS VAC: HCPCS

## 2018-11-05 RX ORDER — MONTELUKAST SODIUM 10 MG/1
10 TABLET ORAL
Qty: 90 TABLET | Refills: 3 | Status: SHIPPED | OUTPATIENT
Start: 2018-11-05 | End: 2020-03-30 | Stop reason: SDUPTHER

## 2018-11-05 RX ORDER — FLUTICASONE FUROATE AND VILANTEROL 200; 25 UG/1; UG/1
1 POWDER RESPIRATORY (INHALATION) DAILY
Qty: 1 INHALER | Refills: 0 | Status: SHIPPED | COMMUNITY
Start: 2018-11-05 | End: 2018-11-19 | Stop reason: SDUPTHER

## 2018-11-05 RX ORDER — ROPINIROLE 2 MG/1
2 TABLET, FILM COATED ORAL
Qty: 90 TABLET | Refills: 3 | Status: SHIPPED | OUTPATIENT
Start: 2018-11-05 | End: 2021-01-19 | Stop reason: SDUPTHER

## 2018-11-05 NOTE — PROGRESS NOTES
Assessment/Plan:       Problem List Items Addressed This Visit        Respiratory    Asthma     Spirometry today which is performed after patient has been off of medications for at least 1 week shows evidence of obstructive defect with FEV1 of 85%  Prior spirometry performed 1/2017 showed normal obstructive ratio  Patient has been out of his medications however despite being on his medications he does continue to use the Ventolin on a daily basis  Therefore this is likely uncontrolled asthma and would recommend escalation of therapy to include long-acting beta agonist   He is given samples of Breo today  Patient does have concerns of impaired bone density given long term use of inhaled corticosteroids and multiple musculoskeletal pains that have been ongoing  Therefore will obtain DEXA scan to assess  Relevant Medications    Budesonide (PULMICORT FLEXHALER IN)    montelukast (SINGULAIR) 10 mg tablet    VENTOLIN  (90 Base) MCG/ACT inhaler    fluticasone-vilanterol (BREO ELLIPTA) 200-25 MCG/INH inhaler    Other Relevant Orders    DXA body comp analysis    XR chest pa & lateral    CBC and differential    IgE    TAJ (obstructive sleep apnea)     Patient has history of previously diagnosed obstructive sleep apnea that is diagnosed in 2006  Patient had AHI of 14 7 at that time  Patient was subsequently titrated to BiPAP 10/7 however he has not been able to tolerate this and therefore has not been using the machine  Patient also was noted to have elevated periodic limb movement index at 64 7  Would recommend repeat titration study and treatment  Underlying pathophysiology and risks of untreated sleep apnea were discussed in detail  Avoidance of sedatives, supine sleep, alcohol, narcotics in the setting of untreated sleep apnea is discussed  Absolute avoidance of driving while drowsy is also discussed  Avoid weight gain/encourage weight loss                  Other    Restless leg syndrome Patient does have prior diagnosis of restless leg syndrome  He does respond to Requip  It is unclear if he has had baseline iron studies  Therefore will obtain this at this time  Relevant Medications    rOPINIRole (REQUIP) 2 mg tablet    Other Relevant Orders    Iron, TIBC and Ferritin Panel      Other Visit Diagnoses     Former smoker    -  Primary    Relevant Orders    POCT spirometry (Completed)    Moderate persistent asthma, uncomplicated         Relevant Medications    Budesonide (PULMICORT FLEXHALER IN)    montelukast (SINGULAIR) 10 mg tablet    VENTOLIN  (90 Base) MCG/ACT inhaler    fluticasone-vilanterol (BREO ELLIPTA) 200-25 MCG/INH inhaler    Other Relevant Orders    IgE    Encounter for immunization         Relevant Orders    SYRINGE/SINGLE-DOSE VIAL: influenza vaccine, [id number], quadrivalent, 0 5 mL, preservative-free, for patients 3+ yr (FLUZONE) (Completed)          Data from his prior pulmonologist is also reviewed in detail  All questions are answered to the patient's satisfaction and understanding  He verbalizes understanding  He is encouraged to call with any further questions or concerns  Portions of the record may have been created with voice recognition software  Occasional wrong word or "sound a like" substitutions may have occurred due to the inherent limitations of voice recognition software  Read the chart carefully and recognize, using context, where substitutions have occurred  a    Electronically Signed by Taylor Anne MD    ______________________________________________________________________    Chief Complaint:   Chief Complaint   Patient presents with    Asthma     was with Dr Alejandro reyes        Patient ID: Oz Perrin is a 66 y o  y o  male has a past medical history of Asthma; Near syncope; and Sleep apnea, obstructive  11/5/2018  Patient presents today for initial visit  Patient has a history of asthma     Was with Dr Alejandro Infante but he is now retired  He is here to re-establish care  Diagnosed with Asthma- 1990  Occupational related  Worked as a research   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  Never been hospitalized  No ICU admissions  He is on steroids about 3 times- triggers are physical activity, dust/pollen, humidity, alcohols  He has been experiencing one and joint issues which he feels lis due to inhaled corticosteroids  He has right arm rotator cuff- he has had an injury at that time and it seems to have worsened  He does have other joint issues  He feels like he is having bone pain  Over the past week he has been cleaning and had some pain after  He uses Pulmicort 80 1 puff twice a day  He has ventolin and uses it  a couple of times per day  He does wake up at night a couple of times per week needing to use his rescue inhaler  As he is falling asleep he can hear the wheezing  He was tried on Advair and couple of other things but does not note any difference among them  Ran out of Pulmicort last week  Cough- this is happening now after the Spirometry    Had lung infection a couple of weeks ago  Was treated with antibiotics  He had cough, chest tightness  No fever  The week prior he had an ear blockage- had that cleared out  He does have post nasal drip  His sinuses are a big contributing factor  He has bad polyps  He does use Nasacort  It does not help  He does see ENT  Will see if this can be surgically helped  ENT is Dr Immanuel Mora  He did have allergen panel and tested positive for everything  He did get allergy shots and he did not notice any difference  TAJ- diagnosed in 2005- he does not use the CPAP machine  He cannot deal with the mask  If his breathing is impaired  He does use the CPAP and this does help him  His machine is very old  [place] and he could not carry it alone       +GERD- controlled on Nexium  HPI    Occupational/Exposure history: he is on disability due to asthma  Pets/Enviroment: recently got 3 kittens  Review of Systems   Constitutional: Negative for activity change, appetite change, chills, fatigue and fever  HENT: Negative for congestion, dental problem, postnasal drip, sinus pain, sneezing, sore throat and voice change  Eyes: Negative for visual disturbance  Respiratory:        See HPI   Cardiovascular: Negative for chest pain, palpitations and leg swelling  Gastrointestinal: Negative for abdominal pain, diarrhea, nausea and vomiting  Genitourinary: Negative for difficulty urinating and dysuria  Musculoskeletal: Negative for arthralgias and back pain  Skin: Negative for rash and wound  Allergic/Immunologic: Negative for environmental allergies and food allergies  Neurological: Negative for dizziness, numbness and headaches  Hematological: Negative for adenopathy  Psychiatric/Behavioral: Negative for agitation, behavioral problems and confusion  Social history: He reports that he quit smoking about 18 years ago  He has a 7 50 pack-year smoking history  He has never used smokeless tobacco  He reports that he does not drink alcohol or use drugs      Past surgical history:   Past Surgical History:   Procedure Laterality Date    ARTHROSCOPY KNEE      SINUS SURGERY       Family history:   Family History   Problem Relation Age of Onset    Dementia Mother     Kidney disease Mother     Dementia Father     Rheumatologic disease Father     Other Father         rheumatism       Immunization History   Administered Date(s) Administered    Influenza Quadrivalent Preservative Free Pediatric IM 10/01/2016    Influenza TIV (IM) 11/06/2017     Current Outpatient Prescriptions   Medication Sig Dispense Refill    Budesonide (PULMICORT FLEXHALER IN) Inhale      esomeprazole (NexIUM) 20 mg capsule Take 20 mg by mouth daily  0    mometasone (NASONEX) 50 mcg/act nasal spray instill 2 sprays into each nostril twice a day  1    Montelukast Sodium (SINGULAIR PO) Take by mouth      rosuvastatin (CRESTOR) 10 MG tablet Take 1 tablet (10 mg total) by mouth daily 30 tablet 3    VENTOLIN  (90 Base) MCG/ACT inhaler 2 puffs 4 (four) times a day as needed  0    guaifenesin-codeine (GUAIFENESIN AC) 100-10 MG/5ML liquid Take 5 mL by mouth 3 (three) times a day as needed for cough (Patient not taking: Reported on 11/5/2018 ) 120 mL 0     No current facility-administered medications for this visit  Allergies: Patient has no known allergies  Objective:  Vitals:    11/05/18 1008   BP: 122/84   BP Location: Right arm   Patient Position: Sitting   Cuff Size: Standard   Pulse: 64   Resp: 12   Temp: 98 °F (36 7 °C)   TempSrc: Oral   SpO2: 98%   Weight: 94 8 kg (209 lb)   Height: 6' (1 829 m)   Oxygen Therapy  SpO2: 98 %    Wt Readings from Last 3 Encounters:   11/05/18 94 8 kg (209 lb)   10/29/18 90 7 kg (200 lb)   09/27/18 96 2 kg (212 lb)     Body mass index is 28 35 kg/m²  Physical Exam   Constitutional: He is oriented to person, place, and time  He appears well-developed and well-nourished  No distress  HENT:   Head: Normocephalic and atraumatic  Mouth/Throat: Oropharynx is clear and moist  No oropharyngeal exudate  Eyes: Pupils are equal, round, and reactive to light  EOM are normal    Neck: Normal range of motion  Neck supple  Cardiovascular: Normal rate and regular rhythm  No murmur heard  Pulmonary/Chest: Effort normal and breath sounds normal  No respiratory distress  He has no wheezes  He has no rales  He exhibits no tenderness  Abdominal: Soft  Bowel sounds are normal  He exhibits no distension  There is no tenderness  Musculoskeletal: Normal range of motion  He exhibits no edema  Neurological: He is alert and oriented to person, place, and time  No cranial nerve deficit  Skin: Skin is warm and dry  He is not diaphoretic  Psychiatric: He has a normal mood and affect  His behavior is normal    Vitals reviewed

## 2018-11-06 PROBLEM — G47.33 OSA (OBSTRUCTIVE SLEEP APNEA): Status: ACTIVE | Noted: 2018-11-06

## 2018-11-06 NOTE — ASSESSMENT & PLAN NOTE
Spirometry today which is performed after patient has been off of medications for at least 1 week shows evidence of obstructive defect with FEV1 of 85%  Prior spirometry performed 1/2017 showed normal obstructive ratio  Patient has been out of his medications however despite being on his medications he does continue to use the Ventolin on a daily basis  Therefore this is likely uncontrolled asthma and would recommend escalation of therapy to include long-acting beta agonist   He is given samples of Breo today  Patient does have concerns of impaired bone density given long term use of inhaled corticosteroids and multiple musculoskeletal pains that have been ongoing  Therefore will obtain DEXA scan to assess

## 2018-11-06 NOTE — ASSESSMENT & PLAN NOTE
Patient has history of previously diagnosed obstructive sleep apnea that is diagnosed in 2006  Patient had AHI of 14 7 at that time  Patient was subsequently titrated to BiPAP 10/7 however he has not been able to tolerate this and therefore has not been using the machine  Patient also was noted to have elevated periodic limb movement index at 64 7  Would recommend repeat titration study and treatment  Underlying pathophysiology and risks of untreated sleep apnea were discussed in detail  Avoidance of sedatives, supine sleep, alcohol, narcotics in the setting of untreated sleep apnea is discussed  Absolute avoidance of driving while drowsy is also discussed  Avoid weight gain/encourage weight loss

## 2018-11-07 ENCOUNTER — TRANSCRIBE ORDERS (OUTPATIENT)
Dept: ADMINISTRATIVE | Facility: HOSPITAL | Age: 58
End: 2018-11-07

## 2018-11-07 ENCOUNTER — APPOINTMENT (OUTPATIENT)
Dept: RADIOLOGY | Facility: CLINIC | Age: 58
End: 2018-11-07
Payer: MEDICARE

## 2018-11-07 ENCOUNTER — APPOINTMENT (OUTPATIENT)
Dept: LAB | Facility: CLINIC | Age: 58
End: 2018-11-07
Payer: MEDICARE

## 2018-11-07 DIAGNOSIS — G25.81 RESTLESS LEG SYNDROME: ICD-10-CM

## 2018-11-07 DIAGNOSIS — J45.909 UNCOMPLICATED ASTHMA, UNSPECIFIED ASTHMA SEVERITY, UNSPECIFIED WHETHER PERSISTENT: ICD-10-CM

## 2018-11-07 DIAGNOSIS — J45.40 MODERATE PERSISTENT ASTHMA, UNCOMPLICATED: ICD-10-CM

## 2018-11-07 LAB
BASOPHILS # BLD AUTO: 0.03 THOUSANDS/ΜL (ref 0–0.1)
BASOPHILS NFR BLD AUTO: 0 % (ref 0–1)
EOSINOPHIL # BLD AUTO: 0.23 THOUSAND/ΜL (ref 0–0.61)
EOSINOPHIL NFR BLD AUTO: 3 % (ref 0–6)
ERYTHROCYTE [DISTWIDTH] IN BLOOD BY AUTOMATED COUNT: 12.3 % (ref 11.6–15.1)
FERRITIN SERPL-MCNC: 96 NG/ML (ref 8–388)
HCT VFR BLD AUTO: 43.3 % (ref 36.5–49.3)
HGB BLD-MCNC: 14 G/DL (ref 12–17)
IMM GRANULOCYTES # BLD AUTO: 0.03 THOUSAND/UL (ref 0–0.2)
IMM GRANULOCYTES NFR BLD AUTO: 0 % (ref 0–2)
IRON SATN MFR SERPL: 17 %
IRON SERPL-MCNC: 57 UG/DL (ref 65–175)
LYMPHOCYTES # BLD AUTO: 1.72 THOUSANDS/ΜL (ref 0.6–4.47)
LYMPHOCYTES NFR BLD AUTO: 22 % (ref 14–44)
MCH RBC QN AUTO: 29.1 PG (ref 26.8–34.3)
MCHC RBC AUTO-ENTMCNC: 32.3 G/DL (ref 31.4–37.4)
MCV RBC AUTO: 90 FL (ref 82–98)
MONOCYTES # BLD AUTO: 0.52 THOUSAND/ΜL (ref 0.17–1.22)
MONOCYTES NFR BLD AUTO: 7 % (ref 4–12)
NEUTROPHILS # BLD AUTO: 5.46 THOUSANDS/ΜL (ref 1.85–7.62)
NEUTS SEG NFR BLD AUTO: 68 % (ref 43–75)
NRBC BLD AUTO-RTO: 0 /100 WBCS
PLATELET # BLD AUTO: 187 THOUSANDS/UL (ref 149–390)
PMV BLD AUTO: 11.1 FL (ref 8.9–12.7)
RBC # BLD AUTO: 4.81 MILLION/UL (ref 3.88–5.62)
TIBC SERPL-MCNC: 329 UG/DL (ref 250–450)
WBC # BLD AUTO: 7.99 THOUSAND/UL (ref 4.31–10.16)

## 2018-11-07 PROCEDURE — 83550 IRON BINDING TEST: CPT

## 2018-11-07 PROCEDURE — 83540 ASSAY OF IRON: CPT

## 2018-11-07 PROCEDURE — 85025 COMPLETE CBC W/AUTO DIFF WBC: CPT

## 2018-11-07 PROCEDURE — 82785 ASSAY OF IGE: CPT

## 2018-11-07 PROCEDURE — 36415 COLL VENOUS BLD VENIPUNCTURE: CPT

## 2018-11-07 PROCEDURE — 82728 ASSAY OF FERRITIN: CPT

## 2018-11-07 PROCEDURE — 71046 X-RAY EXAM CHEST 2 VIEWS: CPT

## 2018-11-09 LAB — TOTAL IGE SMQN RAST: 1068 KU/L (ref 0–113)

## 2018-11-13 ENCOUNTER — HOSPITAL ENCOUNTER (OUTPATIENT)
Dept: RADIOLOGY | Facility: HOSPITAL | Age: 58
Discharge: HOME | End: 2018-11-13
Attending: ORTHOPAEDIC SURGERY
Payer: MEDICARE

## 2018-11-13 DIAGNOSIS — G89.29 CHRONIC RIGHT SHOULDER PAIN: ICD-10-CM

## 2018-11-13 DIAGNOSIS — M25.511 CHRONIC RIGHT SHOULDER PAIN: ICD-10-CM

## 2018-11-13 PROCEDURE — 73221 MRI JOINT UPR EXTREM W/O DYE: CPT

## 2018-11-14 ENCOUNTER — TELEPHONE (OUTPATIENT)
Dept: OBGYN CLINIC | Facility: CLINIC | Age: 58
End: 2018-11-14

## 2018-11-14 NOTE — TELEPHONE ENCOUNTER
----- Message from Jane Cornejo DO sent at 11/14/2018  2:17 PM EST -----  Mela Miller had an MRI of his right shoulder  The MRI showed a rotator cuff tear in his shoulder in the area I was worried about  His MRI also showed some mild arthritis in his shoulder  He is scheduled to see me on 11/19 in [place], I would like for him to see Dr Teri Fuller to discuss if this needs surgery or not  If he would like we could cancel the [place] appointment and schedule him with Dr Teri Fuller at the next available appointment  If he wants to keep the appointment on the 19th I can go over his MRI with him, but I still would like for him to see Lucila

## 2018-11-19 ENCOUNTER — OFFICE VISIT (OUTPATIENT)
Dept: OBGYN CLINIC | Facility: CLINIC | Age: 58
End: 2018-11-19
Payer: MEDICARE

## 2018-11-19 ENCOUNTER — TELEPHONE (OUTPATIENT)
Dept: PULMONOLOGY | Facility: MEDICAL CENTER | Age: 58
End: 2018-11-19

## 2018-11-19 VITALS
HEIGHT: 72 IN | WEIGHT: 200 LBS | HEART RATE: 70 BPM | DIASTOLIC BLOOD PRESSURE: 86 MMHG | BODY MASS INDEX: 27.09 KG/M2 | SYSTOLIC BLOOD PRESSURE: 124 MMHG

## 2018-11-19 DIAGNOSIS — J45.40 MODERATE PERSISTENT ASTHMA, UNCOMPLICATED: ICD-10-CM

## 2018-11-19 DIAGNOSIS — J45.40 MODERATE PERSISTENT ASTHMA WITHOUT COMPLICATION: Primary | ICD-10-CM

## 2018-11-19 DIAGNOSIS — M19.011 PRIMARY OSTEOARTHRITIS OF RIGHT SHOULDER: ICD-10-CM

## 2018-11-19 DIAGNOSIS — M75.121 COMPLETE TEAR OF RIGHT ROTATOR CUFF: Primary | ICD-10-CM

## 2018-11-19 PROCEDURE — 99213 OFFICE O/P EST LOW 20 MIN: CPT | Performed by: ORTHOPAEDIC SURGERY

## 2018-11-19 RX ORDER — NAPROXEN 500 MG/1
500 TABLET ORAL 2 TIMES DAILY WITH MEALS
Qty: 60 TABLET | Refills: 0 | Status: SHIPPED | OUTPATIENT
Start: 2018-11-19 | End: 2022-01-03

## 2018-11-19 RX ORDER — FLUTICASONE FUROATE AND VILANTEROL 200; 25 UG/1; UG/1
1 POWDER RESPIRATORY (INHALATION) DAILY
Qty: 1 INHALER | Refills: 6 | Status: SHIPPED | OUTPATIENT
Start: 2018-11-19 | End: 2018-11-23 | Stop reason: SDUPTHER

## 2018-11-19 NOTE — PROGRESS NOTES
Assessment/Plan:  1  Complete tear of right rotator cuff  Ambulatory referral to Orthopedic Surgery    naproxen (EC NAPROSYN) 500 MG EC tablet    Supraspinatus tendon   2  Primary osteoarthritis of right shoulder         Scribe Attestation    I,:   Nakia Escobar am acting as a scribe while in the presence of the attending physician :        I,:   María Ansari, DO personally performed the services described in this documentation    as scribed in my presence :              Tracy Darling has right sided shoulder pain consistent with a supraspinatus full thickness tear  I would like him to follow up with Dr Gladys Sawyer to discuss surgical intervention vs conservative treatment  I did give him a prescription for Naproxen at today's appointment  He should discontinue the use of the Motrin while taking the naproxen  He will follow up with Dr Gladys Sawyer  Subjective:   Main Spivey is a 66 y o  male who returns to the office today for follow up right shoulder pain and MRI review  He was last seen 3 weeks ago where he was experiencing chronic right shoulder pain concerning for a possible rotator cuff tear  At today's appointment he states his pain is a constant moderate pain that is worse then at his last visit  He has been taking Motrin as needed which only helps the pain slightly  He denies any new injury or trauma since his last amount  He denies any radiating pain, numbness, or tingling does his arms  Review of Systems   Constitutional: Negative for chills, fever and unexpected weight change  HENT: Negative for hearing loss, nosebleeds and sore throat  Eyes: Negative for pain, redness and visual disturbance  Respiratory: Negative for cough, shortness of breath and wheezing  Cardiovascular: Negative for chest pain, palpitations and leg swelling  Gastrointestinal: Negative for abdominal pain, nausea and vomiting  Endocrine: Negative for polyphagia and polyuria     Genitourinary: Negative for dysuria and hematuria  Musculoskeletal: Positive for arthralgias and myalgias  See HPI   Skin: Negative for rash and wound  Neurological: Negative for dizziness, numbness and headaches  Psychiatric/Behavioral: Negative for decreased concentration and suicidal ideas  The patient is not nervous/anxious  Past Medical History:   Diagnosis Date    Asthma     Near syncope     Sleep apnea, obstructive        Past Surgical History:   Procedure Laterality Date    ARTHROSCOPY KNEE      SINUS SURGERY         Family History   Problem Relation Age of Onset    Dementia Mother     Kidney disease Mother     Dementia Father     Rheumatologic disease Father     Other Father         rheumatism       Social History     Occupational History    Not on file       Social History Main Topics    Smoking status: Former Smoker     Packs/day: 0 50     Years: 15 00     Quit date: 2000    Smokeless tobacco: Never Used      Comment: smoked 3-4 days     Alcohol use No    Drug use: No    Sexual activity: Not on file         Current Outpatient Prescriptions:     Budesonide (PULMICORT FLEXHALER IN), Inhale, Disp: , Rfl:     esomeprazole (NexIUM) 20 mg capsule, , Disp: , Rfl: 0    fluticasone-vilanterol (BREO ELLIPTA) 200-25 MCG/INH inhaler, Inhale 1 puff daily Rinse mouth after use , Disp: 1 Inhaler, Rfl: 0    mometasone (NASONEX) 50 mcg/act nasal spray, instill 2 sprays into each nostril twice a day, Disp: , Rfl: 1    montelukast (SINGULAIR) 10 mg tablet, Take 1 tablet (10 mg total) by mouth daily at bedtime, Disp: 90 tablet, Rfl: 3    rOPINIRole (REQUIP) 2 mg tablet, Take 1 tablet (2 mg total) by mouth daily at bedtime, Disp: 90 tablet, Rfl: 3    rosuvastatin (CRESTOR) 10 MG tablet, Take 1 tablet (10 mg total) by mouth daily, Disp: 30 tablet, Rfl: 3    VENTOLIN  (90 Base) MCG/ACT inhaler, Inhale 2 puffs every 4 (four) hours as needed for wheezing or shortness of breath, Disp: 3 Inhaler, Rfl: 3    naproxen (EC NAPROSYN) 500 MG EC tablet, Take 1 tablet (500 mg total) by mouth 2 (two) times a day with meals, Disp: 60 tablet, Rfl: 0    No Known Allergies    Objective:  Vitals:    11/19/18 1006   BP: 124/86   Pulse: 70       Right Shoulder Exam     Range of Motion   The patient has normal right shoulder ROM  Muscle Strength   Right shoulder normal muscle strength: Pain with resistance   Supraspinatus: 4/5     Tests   Drop Arm: negative    Comments:  Painful range of motion             Physical Exam   Constitutional: He is oriented to person, place, and time  He appears well-developed  HENT:   Head: Normocephalic and atraumatic  Eyes: Conjunctivae are normal    Neck: Neck supple  Cardiovascular: Intact distal pulses  Pulmonary/Chest: Effort normal    Neurological: He is alert and oriented to person, place, and time  Skin: Skin is warm and dry  Psychiatric: He has a normal mood and affect  His behavior is normal    Vitals reviewed  I have personally reviewed pertinent films in PACS and my interpretation is as follows:  Right shoulder MRI taken on 11/13/2018 shows a full thickness tear of the anterior distal supraspinatus tendon  Mild glenohumeral osteoarthritis is present  Subacromial bursa effusion present  Several small loose bodies are seen in the subscapular recess

## 2018-11-19 NOTE — PROGRESS NOTES
Patient notified of the results of testing  Recommend CT high resolution  He will call scheduling department  Melissa Brambila is ordered

## 2018-11-20 ENCOUNTER — OFFICE VISIT (OUTPATIENT)
Dept: OBGYN CLINIC | Facility: CLINIC | Age: 58
End: 2018-11-20
Payer: MEDICARE

## 2018-11-20 VITALS — WEIGHT: 200 LBS | HEIGHT: 72 IN | BODY MASS INDEX: 27.09 KG/M2

## 2018-11-20 DIAGNOSIS — M75.121 COMPLETE TEAR OF RIGHT ROTATOR CUFF: ICD-10-CM

## 2018-11-20 PROCEDURE — 99214 OFFICE O/P EST MOD 30 MIN: CPT | Performed by: ORTHOPAEDIC SURGERY

## 2018-11-20 NOTE — PROGRESS NOTES
Assessment/Plan:  1  Complete tear of right rotator cuff  Ambulatory referral to Orthopedic Surgery    Supraspinatus tendon     Scribe Attestation    I,:   Maru Shoemaker am acting as a scribe while in the presence of the attending physician :        I,:   Jasmyn Blum MD personally performed the services described in this documentation    as scribed in my presence :          Kiko Ireland has a small but full-thickness tear to the anterior leading edge of the supraspinatus tendon  I explained to him that this tear will likely worsen over time and retract  Most patients in his age group would choose to have the rotator cuff repaired as these tears typically will demonstrate increasing symptoms over time  He has already done physical therapy exercises and failed to demonstrate good relief  We also discussed that if we do see the loose bodies that were observed on MRI, then we will certainly remove those  We also discussed possible decompression if a spur is found  We discussed the procedure in detail  Recovery will be 4-6 months  He will require extensive rehabilitation and will be in a sling for the 1st 6 weeks following his surgery  We also discussed the risks of surgery  Risk of the surgery are inclusive of but not limited to bleeding, infection, nerve injury, blood clot, worsening of symptoms, not achieving the anticipated results, persistent stiffness, weakness and the need for additional surgery  The patient verbally stated he understood those risks and would like to proceed with the surgery  Our  will contact him to set up a date  Subjective:   Oscar Brush is a 66 y o  male who has been referred to us by Dr Todd Allen for chronic right shoulder pain  Kiko Ireland has been experiencing pain in the right shoulder for years originally dating back to 2012 when he injured the shoulder during [address]     Over the years he was experiencing intermittently painful episodes but unfortunately his pain has significantly worsened  Dr Todd Allen ordered an MRI due to his chronic shoulder pain weakness and previous failure of conservative at measures such as physical therapy and cortisone injection  The MRI did return positive for a full-thickness rotator cuff tear to the anterior leading edge of the supraspinatus tendon  He is here today to discuss treatment options  His chief complaint is of the intermittent sharp and severe pain in the anterior lateral aspect of the right shoulder  His pain is exacerbated for reaching for objects or reaching overhead  Our movements behind his back also exacerbates his pain  He denies radiating pain or distal paresthesias  He is somewhat better with rest       Review of Systems   Constitutional: Negative for chills, fever and unexpected weight change  HENT: Negative for hearing loss, nosebleeds and sore throat  Eyes: Negative for pain, redness and visual disturbance  Respiratory: Negative for cough, shortness of breath and wheezing  Cardiovascular: Negative for chest pain, palpitations and leg swelling  Gastrointestinal: Negative for abdominal pain, nausea and vomiting  Endocrine: Negative for polyphagia and polyuria  Genitourinary: Negative for dysuria and hematuria  Musculoskeletal:        See HPI   Skin: Negative for rash and wound  Neurological: Negative for dizziness, numbness and headaches  Psychiatric/Behavioral: Negative for decreased concentration and suicidal ideas  The patient is not nervous/anxious            Past Medical History:   Diagnosis Date    Asthma     Near syncope     Sleep apnea, obstructive        Past Surgical History:   Procedure Laterality Date    ARTHROSCOPY KNEE      SINUS SURGERY         Family History   Problem Relation Age of Onset    Dementia Mother     Kidney disease Mother     Dementia Father     Rheumatologic disease Father     Other Father         rheumatism       Social History     Occupational History    Not on file  Social History Main Topics    Smoking status: Former Smoker     Packs/day: 0 50     Years: 15 00     Quit date: 2000    Smokeless tobacco: Never Used      Comment: smoked 3-4 days     Alcohol use No    Drug use: No    Sexual activity: Not on file         Current Outpatient Prescriptions:     Budesonide (PULMICORT FLEXHALER IN), Inhale, Disp: , Rfl:     esomeprazole (NexIUM) 20 mg capsule, , Disp: , Rfl: 0    fluticasone-vilanterol (BREO ELLIPTA) 200-25 MCG/INH inhaler, Inhale 1 puff daily Rinse mouth after use , Disp: 1 Inhaler, Rfl: 6    mometasone (NASONEX) 50 mcg/act nasal spray, instill 2 sprays into each nostril twice a day, Disp: , Rfl: 1    montelukast (SINGULAIR) 10 mg tablet, Take 1 tablet (10 mg total) by mouth daily at bedtime, Disp: 90 tablet, Rfl: 3    naproxen (EC NAPROSYN) 500 MG EC tablet, Take 1 tablet (500 mg total) by mouth 2 (two) times a day with meals, Disp: 60 tablet, Rfl: 0    rOPINIRole (REQUIP) 2 mg tablet, Take 1 tablet (2 mg total) by mouth daily at bedtime, Disp: 90 tablet, Rfl: 3    rosuvastatin (CRESTOR) 10 MG tablet, Take 1 tablet (10 mg total) by mouth daily, Disp: 30 tablet, Rfl: 3    VENTOLIN  (90 Base) MCG/ACT inhaler, Inhale 2 puffs every 4 (four) hours as needed for wheezing or shortness of breath, Disp: 3 Inhaler, Rfl: 3    No Known Allergies    Objective: There were no vitals filed for this visit  Right Shoulder Exam     Tenderness   The patient is experiencing no tenderness  Range of Motion   Active Abduction: 100   Forward Flexion: 90   External Rotation: 80   Internal Rotation 0 degrees: L5     Muscle Strength   Abduction: 4/5   Internal Rotation: 5/5   External Rotation: 4/5   Supraspinatus: 4/5   Subscapularis: 5/5     Tests   Impingement: positive    Other   Sensation: normal  Pulse: present (2+ radial)            Physical Exam   Constitutional: He is oriented to person, place, and time   He appears well-developed and well-nourished  HENT:   Head: Normocephalic and atraumatic  Eyes: Conjunctivae are normal  Right eye exhibits no discharge  Left eye exhibits no discharge  Neck: Normal range of motion  Neck supple  Cardiovascular: Regular rhythm, normal heart sounds and intact distal pulses  Pulmonary/Chest: Effort normal and breath sounds normal  No respiratory distress  Neurological: He is alert and oriented to person, place, and time  Skin: Skin is warm and dry  Psychiatric: He has a normal mood and affect  His behavior is normal    Vitals reviewed  I have personally reviewed pertinent films in PACS and my interpretation is as follows:  MRI of the right shoulder demonstrates a smal but full thickness tear to the anterior leading edge of the supraspinatus tendon

## 2018-11-21 PROBLEM — M75.121 COMPLETE TEAR OF RIGHT ROTATOR CUFF: Status: ACTIVE | Noted: 2018-11-21

## 2018-11-23 ENCOUNTER — APPOINTMENT (OUTPATIENT)
Dept: PREADMISSION TESTING | Facility: HOSPITAL | Age: 58
End: 2018-11-23
Payer: MEDICARE

## 2018-11-23 ENCOUNTER — TRANSCRIBE ORDERS (OUTPATIENT)
Dept: ADMINISTRATIVE | Facility: HOSPITAL | Age: 58
End: 2018-11-23

## 2018-11-23 ENCOUNTER — APPOINTMENT (OUTPATIENT)
Dept: LAB | Facility: HOSPITAL | Age: 58
End: 2018-11-23
Attending: ORTHOPAEDIC SURGERY
Payer: MEDICARE

## 2018-11-23 ENCOUNTER — TELEPHONE (OUTPATIENT)
Dept: PULMONOLOGY | Facility: MEDICAL CENTER | Age: 58
End: 2018-11-23

## 2018-11-23 DIAGNOSIS — Z01.818 PREOP EXAMINATION: Primary | ICD-10-CM

## 2018-11-23 DIAGNOSIS — Z01.818 PREOP EXAMINATION: ICD-10-CM

## 2018-11-23 DIAGNOSIS — M75.121 COMPLETE TEAR OF RIGHT ROTATOR CUFF: ICD-10-CM

## 2018-11-23 DIAGNOSIS — Z01.812 ENCOUNTER FOR PRE-OPERATIVE LABORATORY TESTING: ICD-10-CM

## 2018-11-23 DIAGNOSIS — J45.40 MODERATE PERSISTENT ASTHMA, UNCOMPLICATED: ICD-10-CM

## 2018-11-23 DIAGNOSIS — Z01.818 PRE-OP TESTING: ICD-10-CM

## 2018-11-23 LAB
ANION GAP SERPL CALCULATED.3IONS-SCNC: 8 MMOL/L (ref 4–13)
APTT PPP: 28 SECONDS (ref 24–33)
BASOPHILS # BLD AUTO: 0.03 THOUSANDS/ΜL (ref 0–0.1)
BASOPHILS NFR BLD AUTO: 0 % (ref 0–1)
BUN SERPL-MCNC: 15 MG/DL (ref 5–25)
CALCIUM SERPL-MCNC: 8.7 MG/DL (ref 8.3–10.1)
CHLORIDE SERPL-SCNC: 106 MMOL/L (ref 100–108)
CO2 SERPL-SCNC: 29 MMOL/L (ref 21–32)
CREAT SERPL-MCNC: 1.15 MG/DL (ref 0.6–1.3)
EOSINOPHIL # BLD AUTO: 0.39 THOUSAND/ΜL (ref 0–0.61)
EOSINOPHIL NFR BLD AUTO: 6 % (ref 0–6)
ERYTHROCYTE [DISTWIDTH] IN BLOOD BY AUTOMATED COUNT: 12 % (ref 11.6–15.1)
GFR SERPL CREATININE-BSD FRML MDRD: 71 ML/MIN/1.73SQ M
GLUCOSE SERPL-MCNC: 89 MG/DL (ref 65–140)
HCT VFR BLD AUTO: 42.9 % (ref 36.5–49.3)
HGB BLD-MCNC: 14.2 G/DL (ref 12–17)
IMM GRANULOCYTES # BLD AUTO: 0.02 THOUSAND/UL (ref 0–0.2)
IMM GRANULOCYTES NFR BLD AUTO: 0 % (ref 0–2)
INR PPP: 1 (ref 0.86–1.16)
LYMPHOCYTES # BLD AUTO: 2.12 THOUSANDS/ΜL (ref 0.6–4.47)
LYMPHOCYTES NFR BLD AUTO: 31 % (ref 14–44)
MCH RBC QN AUTO: 29.6 PG (ref 26.8–34.3)
MCHC RBC AUTO-ENTMCNC: 33.1 G/DL (ref 31.4–37.4)
MCV RBC AUTO: 89 FL (ref 82–98)
MONOCYTES # BLD AUTO: 0.42 THOUSAND/ΜL (ref 0.17–1.22)
MONOCYTES NFR BLD AUTO: 6 % (ref 4–12)
NEUTROPHILS # BLD AUTO: 3.94 THOUSANDS/ΜL (ref 1.85–7.62)
NEUTS SEG NFR BLD AUTO: 57 % (ref 43–75)
NRBC BLD AUTO-RTO: 0 /100 WBCS
PLATELET # BLD AUTO: 193 THOUSANDS/UL (ref 149–390)
PMV BLD AUTO: 10 FL (ref 8.9–12.7)
POTASSIUM SERPL-SCNC: 4.2 MMOL/L (ref 3.5–5.3)
PROTHROMBIN TIME: 10.5 SECONDS (ref 9.4–11.7)
RBC # BLD AUTO: 4.8 MILLION/UL (ref 3.88–5.62)
SODIUM SERPL-SCNC: 143 MMOL/L (ref 136–145)
WBC # BLD AUTO: 6.92 THOUSAND/UL (ref 4.31–10.16)

## 2018-11-23 PROCEDURE — 36415 COLL VENOUS BLD VENIPUNCTURE: CPT

## 2018-11-23 PROCEDURE — 93005 ELECTROCARDIOGRAM TRACING: CPT

## 2018-11-23 PROCEDURE — 85610 PROTHROMBIN TIME: CPT

## 2018-11-23 PROCEDURE — 85025 COMPLETE CBC W/AUTO DIFF WBC: CPT

## 2018-11-23 PROCEDURE — 80048 BASIC METABOLIC PNL TOTAL CA: CPT

## 2018-11-23 PROCEDURE — 85730 THROMBOPLASTIN TIME PARTIAL: CPT

## 2018-11-23 RX ORDER — MULTIVITAMIN
1 TABLET ORAL DAILY
COMMUNITY

## 2018-11-23 RX ORDER — FLUTICASONE FUROATE AND VILANTEROL 200; 25 UG/1; UG/1
1 POWDER RESPIRATORY (INHALATION) DAILY
Qty: 3 INHALER | Refills: 3 | Status: SHIPPED | OUTPATIENT
Start: 2018-11-23 | End: 2020-10-22 | Stop reason: SDUPTHER

## 2018-11-23 NOTE — PRE-PROCEDURE INSTRUCTIONS
My Surgical Experience    The following information was developed to assist you to prepare for your operation  What do I need to do before coming to the hospital?   Arrange for a responsible person to drive you to and from the hospital    Arrange care for your children at home  Children are not allowed in the recovery areas of the hospital   Plan to wear clothing that is easy to put on and take off  If you are having shoulder surgery, wear a shirt that buttons or zippers in the front  Bathing  o Shower the evening before and the morning of your surgery with an antibacterial soap  Please refer to the Pre Op Showering Instructions for Surgery Patients Sheet   o Remove nail polish and all body piercing jewelry  o Do not shave any body part for at least 24 hours before surgery-this includes face, arms, legs and upper body  Food  o Nothing to eat or drink after midnight the night before your surgery  This includes candy and chewing gum  o Exception: If your surgery is after 12:00pm (noon), you may have clear liquids such as 7-Up®, ginger ale, apple or cranberry juice, Jell-O®, water, or clear broth until 8:00 am  o Do not drink milk or juice with pulp on the morning before surgery  o Do not drink alcohol 24 hours before surgery  Medicine  o Follow instructions you received from your surgeon about which medicines you may take on the day of surgery  o If instructed to take medicine on the morning of surgery, take pills with just a small sip of water  Call your prescribing doctor for specific infroamtion on what to do if you take insulin    What should I bring to the hospital?    Bring:  Vern Varghese or a walker, if you have them, for foot or knee surgery   A list of the daily medicines, vitamins, minerals, herbals and nutritional supplements you take   Include the dosages of medicines and the time you take them each day   Glasses, dentures or hearing aids   Minimal clothing; you will be wearing hospital sleepwear   Photo ID; required to verify your identity   If you have a Living Will or Power of , bring a copy of the documents   If you have an ostomy, bring an extra pouch and any supplies you use    Do not bring   Medicines or inhalers   Money, valuables or jewelry    What other information should I know about the day of surgery?  Notify your surgeons if you develop a cold, sore throat, cough, fever, rash or any other illness   Report to the Ambulatory Surgical/Same Day Surgery Unit   You will be instructed to stop at Registration only if you have not been pre-registered   Inform your  fi they do not stay that they will be asked by the staff to leave a phone number where they can be reached   Be available to be reached before surgery  In the event the operating room schedule changes, you may be asked to come in earlier or later than expected    *It is important to tell your doctor and others involved in your health care if you are taking or have been taking any non-prescription drugs, vitamins, minerals, herbals or other nutritional supplements  Any of these may interact with some food or medicines and cause a reaction      Pre-Surgery Instructions:   Medication Instructions    esomeprazole (NexIUM) 20 mg capsule Instructed patient per Anesthesia Guidelines   fluticasone-vilanterol (BREO ELLIPTA) 200-25 MCG/INH inhaler Instructed patient per Anesthesia Guidelines   mometasone (NASONEX) 50 mcg/act nasal spray Instructed patient per Anesthesia Guidelines   montelukast (SINGULAIR) 10 mg tablet Instructed patient per Anesthesia Guidelines   Multiple Vitamin (MULTIVITAMIN) tablet Instructed patient per Anesthesia Guidelines   naproxen (EC NAPROSYN) 500 MG EC tablet Instructed patient per Anesthesia Guidelines   rOPINIRole (REQUIP) 2 mg tablet Instructed patient per Anesthesia Guidelines   rosuvastatin (CRESTOR) 10 MG tablet Instructed patient per Anesthesia Guidelines   VENTOLIN  (90 Base) MCG/ACT inhaler Instructed patient per Anesthesia Guidelines  To take nexium,  breo inhaler, and nasonex spray a m  Of surgery

## 2018-11-23 NOTE — TELEPHONE ENCOUNTER
Would like breo called into the pharmacy with a 3 month supply-   Also ventolin rescue inhaler, with 5 refills    Its cheaper for him to have them sent to the pharmacy that way
77

## 2018-11-27 ENCOUNTER — HOSPITAL ENCOUNTER (OUTPATIENT)
Dept: RADIOLOGY | Facility: HOSPITAL | Age: 58
Discharge: HOME | End: 2018-11-27
Attending: INTERNAL MEDICINE
Payer: MEDICARE

## 2018-11-27 DIAGNOSIS — J45.40 MODERATE PERSISTENT ASTHMA WITHOUT COMPLICATION: ICD-10-CM

## 2018-11-27 LAB
ATRIAL RATE: 67 BPM
P AXIS: 38 DEGREES
PR INTERVAL: 160 MS
QRS AXIS: 8 DEGREES
QRSD INTERVAL: 80 MS
QT INTERVAL: 400 MS
QTC INTERVAL: 422 MS
T WAVE AXIS: 30 DEGREES
VENTRICULAR RATE: 67 BPM

## 2018-11-27 PROCEDURE — 93010 ELECTROCARDIOGRAM REPORT: CPT | Performed by: INTERNAL MEDICINE

## 2018-11-27 PROCEDURE — 71250 CT THORAX DX C-: CPT

## 2018-11-29 ENCOUNTER — OFFICE VISIT (OUTPATIENT)
Dept: PULMONOLOGY | Facility: MEDICAL CENTER | Age: 58
End: 2018-11-29
Payer: MEDICARE

## 2018-11-29 VITALS
SYSTOLIC BLOOD PRESSURE: 130 MMHG | WEIGHT: 208 LBS | BODY MASS INDEX: 28.17 KG/M2 | HEART RATE: 67 BPM | RESPIRATION RATE: 16 BRPM | TEMPERATURE: 97.8 F | HEIGHT: 72 IN | DIASTOLIC BLOOD PRESSURE: 80 MMHG | OXYGEN SATURATION: 96 %

## 2018-11-29 DIAGNOSIS — G47.33 OSA (OBSTRUCTIVE SLEEP APNEA): Primary | ICD-10-CM

## 2018-11-29 DIAGNOSIS — J45.30 MILD PERSISTENT ASTHMA WITHOUT COMPLICATION: ICD-10-CM

## 2018-11-29 PROCEDURE — 99213 OFFICE O/P EST LOW 20 MIN: CPT | Performed by: INTERNAL MEDICINE

## 2018-11-29 NOTE — PROGRESS NOTES
Assessment/Plan:     Problem List Items Addressed This Visit        Respiratory    Asthma     Patient has significantly elevated IgE level  CBC with differential is normal   Will need to obtain allergen panel if patient is agreeable to Xolair therapy  This is discussed in detail and information is given to him today  High-resolution CT chest shows no evidence of interstitial lung disease  TAJ (obstructive sleep apnea) - Primary     Patient has history of previously diagnosed obstructive sleep apnea that is diagnosed in 2006  Patient had AHI of 14 7 at that time  Patient was subsequently titrated to BiPAP 10/7 however he has not been able to tolerate this and therefore has not been using the machine  Patient also was noted to have elevated periodic limb movement index at 64  7      Would recommend repeat titration study and treatment      Underlying pathophysiology and risks of untreated sleep apnea were discussed in detail      Avoidance of sedatives, supine sleep, alcohol, narcotics in the setting of untreated sleep apnea is discussed  Absolute avoidance of driving while drowsy is also discussed  Avoid weight gain/encourage weight loss                        Based on ARISCAT score for postoperative pulmonary complications patient is low risk for his shoulder surgery however given his untreated obstructive sleep apnea with positive airway pressure he will need to be monitored closely for untreated obstructive sleep apnea perioperatively and care with postop pain medications should be undertaken  Continue with all inhalers as prescribed perioperatively  Oxygen supplementation as needed  All questions are answered to the patient's satisfaction and understanding  He verbalizes understanding  He is encouraged to call with any further questions or concerns  Portions of the record may have been created with voice recognition software    Occasional wrong word or "sound a like" substitutions may have occurred due to the inherent limitations of voice recognition software  Read the chart carefully and recognize, using context, where substitutions have occurred  Electronically Signed by Handy Acevedo MD    ______________________________________________________________________    Chief Complaint:   Chief Complaint   Patient presents with    Results     CT Scan       Patient ID: Neal Lawrence is a 66 y o  y o  male has a past medical history of Asthma; Hyperlipidemia; Nasal polyps; Near syncope; and Sleep apnea, obstructive  11/29/2018  Patient presents today for follow-up visit  His breathing is improved however he is having difficulty with his nasal polyps  He will be seeing ENT  No fevers  HPI    Review of Systems   Musculoskeletal: Positive for arthralgias  All other systems reviewed and are negative  Smoking history: He reports that he quit smoking about 18 years ago  He has a 7 50 pack-year smoking history  He has never used smokeless tobacco     The following portions of the patient's history were reviewed and updated as appropriate: allergies, current medications, past family history, past medical history, past social history, past surgical history and problem list     Immunization History   Administered Date(s) Administered    Influenza Quadrivalent Preservative Free Pediatric IM 10/01/2016    Influenza TIV (IM) 11/06/2017    Influenza, injectable, quadrivalent, preservative free 0 5 mL 11/05/2018     Current Outpatient Prescriptions   Medication Sig Dispense Refill    esomeprazole (NexIUM) 20 mg capsule 40 mg daily in the early morning    0    fluticasone-vilanterol (BREO ELLIPTA) 200-25 MCG/INH inhaler Inhale 1 puff daily Rinse mouth after use   3 Inhaler 3    mometasone (NASONEX) 50 mcg/act nasal spray instill 2 sprays into each nostril twice a day  1    montelukast (SINGULAIR) 10 mg tablet Take 1 tablet (10 mg total) by mouth daily at bedtime (Patient taking differently: Take 10 mg by mouth every morning  ) 90 tablet 3    Multiple Vitamin (MULTIVITAMIN) tablet Take 1 tablet by mouth daily      naproxen (EC NAPROSYN) 500 MG EC tablet Take 1 tablet (500 mg total) by mouth 2 (two) times a day with meals 60 tablet 0    rOPINIRole (REQUIP) 2 mg tablet Take 1 tablet (2 mg total) by mouth daily at bedtime 90 tablet 3    rosuvastatin (CRESTOR) 10 MG tablet Take 1 tablet (10 mg total) by mouth daily (Patient taking differently: Take 10 mg by mouth daily at bedtime  ) 30 tablet 3    VENTOLIN  (90 Base) MCG/ACT inhaler Inhale 2 puffs every 4 (four) hours as needed for wheezing or shortness of breath 3 Inhaler 3     No current facility-administered medications for this visit  Allergies: Patient has no known allergies  Objective:  Vitals:    11/29/18 1203   BP: 130/80   BP Location: Right arm   Patient Position: Sitting   Cuff Size: Standard   Pulse: 67   Resp: 16   Temp: 97 8 °F (36 6 °C)   SpO2: 96%   Weight: 94 3 kg (208 lb)   Height: 6' (1 829 m)   Oxygen Therapy  SpO2: 96 %    Wt Readings from Last 3 Encounters:   12/03/18 96 6 kg (213 lb)   11/29/18 94 3 kg (208 lb)   11/20/18 90 7 kg (200 lb)     Body mass index is 28 21 kg/m²  Physical Exam   Constitutional: He is oriented to person, place, and time  He appears well-developed and well-nourished  No distress  HENT:   Head: Normocephalic and atraumatic  Mouth/Throat: Oropharynx is clear and moist  No oropharyngeal exudate  Eyes: Pupils are equal, round, and reactive to light  EOM are normal    Neck: Normal range of motion  Neck supple  Cardiovascular: Normal rate and regular rhythm  No murmur heard  Pulmonary/Chest: Effort normal and breath sounds normal  No respiratory distress  He has no wheezes  He has no rales  He exhibits no tenderness  Abdominal: Soft  Bowel sounds are normal  He exhibits no distension  There is no tenderness  Musculoskeletal: Normal range of motion  He exhibits no edema     Neurological: He is alert and oriented to person, place, and time  No cranial nerve deficit  Skin: Skin is warm and dry  He is not diaphoretic  Psychiatric: He has a normal mood and affect  His behavior is normal    Vitals reviewed  Lab Review:   Reviewed     Diagnostics:  I have personally reviewed pertinent reports  and I have personally reviewed pertinent films in PACS  Xr Chest Pa & Lateral    Result Date: 11/7/2018  Narrative: CHEST INDICATION:   J45 909: Unspecified asthma, uncomplicated  COMPARISON:  04/21/2016 EXAM PERFORMED/VIEWS:  XR CHEST PA & LATERAL Images: 3 FINDINGS: Cardiomediastinal silhouette appears unremarkable  No evidence of heart failure  The lungs are clear  Chronic changes are present  No pneumothorax or pleural effusion  Osseous structures appear within normal limits for patient age  Impression: No acute cardiopulmonary disease  Workstation performed: [id number]     Ct Chest High Resolution    Result Date: 11/29/2018  Narrative: CT CHEST INCLUDING HIGH RESOLUTION SLICES - WITHOUT CONTRAST INDICATION:   J45 40: Moderate persistent asthma, uncomplicated  The patient has a former smoking history  COMPARISON:  Chest radiographic series 11/7/2018 TECHNIQUE: CT examination of the chest was performed without intravenous contrast   Contiguous 1 25 mm transverse images were obtained along with 1 x 10 mm transverse images with the patient prone  Additional thin section images were performed at 10 mm intervals in supine and prone positioning in inspiration as well as supine in expiration  Reformatted images were created in sagittal, and coronal planes  MIP reconstructions were created in the transverse and coronal planes along with minIP reconstructions in the coronal plane  Sagittal reformatted images were also created  Radiation dose length product (DLP) for this visit:  523 95 mGy-cm     This examination, like all CT scans performed in the [facility], was performed utilizing techniques to minimize radiation dose exposure, including the use of iterative  reconstruction and automated exposure control  FINDINGS: LUNGS:  No septal lobular thickening, bronchial wall thickening, bronchiectasis, diffuse nodularity, ground glass infiltrates or honeycombing to suggest interstitial lung disease  There are no focal pulmonary nodules  Mild biapical pleural parenchymal scarring noted with a tiny calcified granuloma in the right middle lobe identified  PLEURA:  No pleural effusions or pleural thickening  VESSELS:  Unremarkable for patient's age  HEART:  Unremarkable  MEDIASTINUM AND DEEPTHI:  No pathologic lymphadenopathy  CHEST WALL AND LOWER NECK:   Unremarkable  VISUALIZED STRUCTURES IN THE UPPER ABDOMEN:  Unremarkable  OSSEOUS STRUCTURES:  No acute fracture or destructive osseous lesion  Impression: No CT evidence of interstitial lung disease  Biapical pleural parenchymal scarring with tiny right middle lobe calcified granuloma  Workstation performed: [id number]     Mri Shoulder Right Wo Contrast    Result Date: 11/14/2018  Narrative: MRI RIGHT SHOULDER INDICATION:   M25 511: Pain in right shoulder G89 29: Other chronic pain  Clinical suspicion of right rotator cuff tear  COMPARISON:  Right shoulder radiographs October 29, 2018  TECHNIQUE:   The following MR sequences were obtained of the right shoulder: Localizer, axial GRE/PD fat sat, oblique coronal T2 fat sat, oblique sagittal T1/T2 fat sat  Images were acquired on a 1 5 [place] unit  Gadolinium was not used  FINDINGS: SUBCUTANEOUS TISSUES: Normal JOINT EFFUSION: Small joint effusion  Several small loose bodies in the subscapularis recess, measuring up to 5 mm  ACROMION PROCESS: Small undersurface spur  ROTATOR CUFF: Thickening and increased signal in the distal supraspinatus tendon, indicative of tendinosis  Full-thickness tear of the anterior leading edge of the supraspinatus at its insertion  Rotator cuff otherwise intact    Cuff muscles unremarkable  SUBACROMIAL/SUBDELTOID BURSA: Moderate sized bursal effusion  LONG HEAD OF BICEPS TENDON: Normal  GLENOID LABRUM: Intact  GLENOHUMERAL JOINT: Mild thinning of articular cartilage  Small humeral head osteophytes  ACROMIOCLAVICULAR JOINT:  Mild osteophytosis, subchondral cysts and periarticular marrow edema  BONES: Normal, except as mentioned above  Impression: 1  Small full-thickness tear of the anterior leading edge of the distal supraspinatus tendon  2   Moderate sized bursal effusion, as can be seen with bursitis  3   Mild osteoarthritis of the right glenohumeral joint with several small loose bodies in the subscapularis recess  4   Osteoarthritis of the right acromioclavicular joint   Workstation performed: [id number]

## 2018-12-03 ENCOUNTER — TELEPHONE (OUTPATIENT)
Dept: PULMONOLOGY | Facility: MEDICAL CENTER | Age: 58
End: 2018-12-03

## 2018-12-03 ENCOUNTER — OFFICE VISIT (OUTPATIENT)
Dept: FAMILY MEDICINE CLINIC | Facility: CLINIC | Age: 58
End: 2018-12-03
Payer: MEDICARE

## 2018-12-03 VITALS
BODY MASS INDEX: 28.85 KG/M2 | TEMPERATURE: 98 F | RESPIRATION RATE: 16 BRPM | HEIGHT: 72 IN | WEIGHT: 213 LBS | SYSTOLIC BLOOD PRESSURE: 126 MMHG | DIASTOLIC BLOOD PRESSURE: 84 MMHG | OXYGEN SATURATION: 97 % | HEART RATE: 76 BPM

## 2018-12-03 DIAGNOSIS — Z01.818 PREOP EXAMINATION: Primary | ICD-10-CM

## 2018-12-03 DIAGNOSIS — M75.121 COMPLETE TEAR OF RIGHT ROTATOR CUFF: ICD-10-CM

## 2018-12-03 PROCEDURE — 99215 OFFICE O/P EST HI 40 MIN: CPT | Performed by: FAMILY MEDICINE

## 2018-12-03 NOTE — ASSESSMENT & PLAN NOTE
Patient has significantly elevated IgE level  CBC with differential is normal   Will need to obtain allergen panel if patient is agreeable to Xolair therapy  This is discussed in detail and information is given to him today  High-resolution CT chest shows no evidence of interstitial lung disease

## 2018-12-03 NOTE — TELEPHONE ENCOUNTER
Spoke with Marc wife  Rachel Cotot and resubmitted  Ventolin correctly through [employer],  After it was submitted correctly it was rejected  So I contacted Inocencio Settler at [facility] [id number] and she submitted for the authorization

## 2018-12-04 NOTE — PROGRESS NOTES
Chief Complaint   Patient presents with   Danilo Tavera 12/6 right shoulder rotator cuff        Patient ID: Bennett Garcia is a 66 y o  male  HPI  64yo pt in for pre-op exam for right shoulder surgery, Date of surgery 12/6/2018  Surgeon: Dr Nguyen Huge  Indication right rotator cuff tear  No acute c/o at time of visit and no known recent illness exposures  Pt reports no adverse reaction to any prior anesthesia received including one or more of the following-general, epidural and spinal     Pertinent medical and surgical history reviewed in problem lists  Pt able to walk 4 blocks or 2 flights of stairs without any concerning cardiovascular symptoms  Pt denies symptoms of easy bruising/bleeding/chest pain/palitations/new or changing edema/cough/wheezing/dyspnea  Pt denies excessive alcohol intake, tobacco or illicit drug use  Family hx is negative for adverse rxn to anesthesia, clotting or bleeding disorder  Ischemic heart disease, stroke, aneurysm or early sudden death  The patient's home  Living situation is secure and supportive and there are no post-op concerns in this regard  Pt follows w pulmonologist for hx asthma and sleep apnea  Asthma controlled per pt and reports not currently using CPAP on any regular basis due to discomfort w use  Recent pulm eval thru Dr Cristina Champion reviewed in chart      Past Medical History:   Diagnosis Date    Asthma     Hyperlipidemia     Near syncope     Sleep apnea, obstructive        Past Surgical History:   Procedure Laterality Date    ARTHROSCOPY KNEE      COLONOSCOPY      SINUS SURGERY         Patient Active Problem List   Diagnosis    Hypercholesterolemia    Asthma    Benign prostatic hyperplasia without urinary obstruction    Testicular hypogonadism    Restless leg syndrome    TAJ (obstructive sleep apnea)    Complete tear of right rotator cuff       Family History   Problem Relation Age of Onset    Dementia Mother     Kidney disease Mother     Dementia Father     Rheumatologic disease Father     Other Father         rheumatism       Immunization History   Administered Date(s) Administered    Influenza Quadrivalent Preservative Free Pediatric IM 10/01/2016    Influenza TIV (IM) 11/06/2017    Influenza, injectable, quadrivalent, preservative free 0 5 mL 11/05/2018       No Known Allergies    Current Outpatient Prescriptions   Medication Sig Dispense Refill    esomeprazole (NexIUM) 20 mg capsule 40 mg daily in the early morning    0    fluticasone-vilanterol (BREO ELLIPTA) 200-25 MCG/INH inhaler Inhale 1 puff daily Rinse mouth after use  3 Inhaler 3    mometasone (NASONEX) 50 mcg/act nasal spray instill 2 sprays into each nostril twice a day  1    montelukast (SINGULAIR) 10 mg tablet Take 1 tablet (10 mg total) by mouth daily at bedtime (Patient taking differently: Take 10 mg by mouth every morning  ) 90 tablet 3    Multiple Vitamin (MULTIVITAMIN) tablet Take 1 tablet by mouth daily      naproxen (EC NAPROSYN) 500 MG EC tablet Take 1 tablet (500 mg total) by mouth 2 (two) times a day with meals 60 tablet 0    rOPINIRole (REQUIP) 2 mg tablet Take 1 tablet (2 mg total) by mouth daily at bedtime 90 tablet 3    rosuvastatin (CRESTOR) 10 MG tablet Take 1 tablet (10 mg total) by mouth daily (Patient taking differently: Take 10 mg by mouth daily at bedtime  ) 30 tablet 3    VENTOLIN  (90 Base) MCG/ACT inhaler Inhale 2 puffs every 4 (four) hours as needed for wheezing or shortness of breath 3 Inhaler 3     No current facility-administered medications for this visit          Social History     Social History    Marital status: /Civil Union     Spouse name: N/A    Number of children: N/A    Years of education: N/A     Social History Main Topics    Smoking status: Former Smoker     Packs/day: 0 50     Years: 15 00     Quit date: 2000    Smokeless tobacco: Never Used      Comment: smoked 3-4 days  Alcohol use No    Drug use: No    Sexual activity: Not Asked     Other Topics Concern    None     Social History Narrative    None       Review of Systems   Constitutional: Negative  Respiratory: Negative  Cardiovascular: Negative  Gastrointestinal: Negative  Musculoskeletal: Positive for arthralgias and myalgias  Skin: Negative  Neurological: Negative  Hematological: Negative  Psychiatric/Behavioral: Negative  Objective:    /84 (BP Location: Left arm, Patient Position: Sitting, Cuff Size: Large)   Pulse 76   Temp 98 °F (36 7 °C)   Resp 16   Ht 6' (1 829 m)   Wt 96 6 kg (213 lb)   SpO2 97% Comment: RA  BMI 28 89 kg/m²        Physical Exam   Constitutional: He is oriented to person, place, and time  He appears well-developed and well-nourished  No distress  HENT:   Head: Normocephalic and atraumatic  Right Ear: External ear normal    Left Ear: External ear normal    Nose: Nose normal    Mouth/Throat: Oropharynx is clear and moist    Eyes: Pupils are equal, round, and reactive to light  Conjunctivae and EOM are normal  No scleral icterus  Neck: Normal range of motion  Neck supple  Cardiovascular: Normal rate, regular rhythm, normal heart sounds and intact distal pulses  Pulmonary/Chest: Effort normal and breath sounds normal  No respiratory distress  He has no wheezes  Abdominal: Soft  Bowel sounds are normal  There is no tenderness  Musculoskeletal: He exhibits tenderness  Neurological: He is alert and oriented to person, place, and time  No cranial nerve deficit  Skin: Skin is warm and dry  Capillary refill takes less than 2 seconds  No rash noted  Psychiatric: He has a normal mood and affect  Nursing note and vitals reviewed  Assessment/Plan:     Diagnoses and all orders for this visit:    Preop examination  Comments:  PT MEDICALLY CLEARED FOR PROCEDURE  PATS REVIEWED-within acceptale range,  PULM NOTE DR WYNNE APRECIATED IN CHART      Complete tear of right rotator cuff       Xochilt William MD

## 2018-12-05 ENCOUNTER — ANESTHESIA EVENT (OUTPATIENT)
Dept: PERIOP | Facility: HOSPITAL | Age: 58
End: 2018-12-05
Payer: MEDICARE

## 2018-12-05 ENCOUNTER — TELEPHONE (OUTPATIENT)
Dept: OBGYN CLINIC | Facility: CLINIC | Age: 58
End: 2018-12-05

## 2018-12-05 NOTE — TELEPHONE ENCOUNTER
JUVENAL Villela from Dr Clancy March office just wanted to let Dr Ciara Asher know that they had a pre-op appointment with this patient yesterday and his clearance note was in epic

## 2018-12-06 ENCOUNTER — ANESTHESIA (OUTPATIENT)
Dept: PERIOP | Facility: HOSPITAL | Age: 58
End: 2018-12-06
Payer: MEDICARE

## 2018-12-06 ENCOUNTER — HOSPITAL ENCOUNTER (OUTPATIENT)
Facility: HOSPITAL | Age: 58
Setting detail: OUTPATIENT SURGERY
Discharge: HOME | End: 2018-12-06
Attending: ORTHOPAEDIC SURGERY | Admitting: ORTHOPAEDIC SURGERY
Payer: MEDICARE

## 2018-12-06 VITALS
DIASTOLIC BLOOD PRESSURE: 82 MMHG | HEART RATE: 58 BPM | OXYGEN SATURATION: 95 % | SYSTOLIC BLOOD PRESSURE: 130 MMHG | TEMPERATURE: 98 F | RESPIRATION RATE: 18 BRPM

## 2018-12-06 PROCEDURE — C9290 INJ, BUPIVACAINE LIPOSOME: HCPCS | Performed by: STUDENT IN AN ORGANIZED HEALTH CARE EDUCATION/TRAINING PROGRAM

## 2018-12-06 PROCEDURE — 29826 SHO ARTHRS SRG DECOMPRESSION: CPT | Performed by: ORTHOPAEDIC SURGERY

## 2018-12-06 PROCEDURE — 29826 SHO ARTHRS SRG DECOMPRESSION: CPT | Performed by: PHYSICIAN ASSISTANT

## 2018-12-06 PROCEDURE — 29827 SHO ARTHRS SRG RT8TR CUF RPR: CPT | Performed by: PHYSICIAN ASSISTANT

## 2018-12-06 PROCEDURE — C1713 ANCHOR/SCREW BN/BN,TIS/BN: HCPCS | Performed by: ORTHOPAEDIC SURGERY

## 2018-12-06 PROCEDURE — 29827 SHO ARTHRS SRG RT8TR CUF RPR: CPT | Performed by: ORTHOPAEDIC SURGERY

## 2018-12-06 DEVICE — SYSTEM IMPLANT SPEEDBRIDGE W/4.75 BCMPS SWIVELOCK C: Type: IMPLANTABLE DEVICE | Site: SHOULDER | Status: FUNCTIONAL

## 2018-12-06 RX ORDER — LABETALOL HYDROCHLORIDE 5 MG/ML
10 INJECTION, SOLUTION INTRAVENOUS AS NEEDED
Status: DISCONTINUED | OUTPATIENT
Start: 2018-12-06 | End: 2018-12-06 | Stop reason: HOSPADM

## 2018-12-06 RX ORDER — MIDAZOLAM HYDROCHLORIDE 1 MG/ML
INJECTION INTRAMUSCULAR; INTRAVENOUS AS NEEDED
Status: DISCONTINUED | OUTPATIENT
Start: 2018-12-06 | End: 2018-12-06 | Stop reason: SURG

## 2018-12-06 RX ORDER — ONDANSETRON 2 MG/ML
4 INJECTION INTRAMUSCULAR; INTRAVENOUS ONCE AS NEEDED
Status: DISCONTINUED | OUTPATIENT
Start: 2018-12-06 | End: 2018-12-06 | Stop reason: HOSPADM

## 2018-12-06 RX ORDER — CEFAZOLIN SODIUM 2 G/50ML
2000 SOLUTION INTRAVENOUS ONCE
Status: COMPLETED | OUTPATIENT
Start: 2018-12-06 | End: 2018-12-06

## 2018-12-06 RX ORDER — HYDROMORPHONE HCL/PF 1 MG/ML
0.4 SYRINGE (ML) INJECTION
Status: DISCONTINUED | OUTPATIENT
Start: 2018-12-06 | End: 2018-12-06 | Stop reason: HOSPADM

## 2018-12-06 RX ORDER — FENTANYL CITRATE 50 UG/ML
INJECTION, SOLUTION INTRAMUSCULAR; INTRAVENOUS AS NEEDED
Status: DISCONTINUED | OUTPATIENT
Start: 2018-12-06 | End: 2018-12-06 | Stop reason: SURG

## 2018-12-06 RX ORDER — FENTANYL CITRATE/PF 50 MCG/ML
50 SYRINGE (ML) INJECTION
Status: DISCONTINUED | OUTPATIENT
Start: 2018-12-06 | End: 2018-12-06 | Stop reason: HOSPADM

## 2018-12-06 RX ORDER — PROMETHAZINE HYDROCHLORIDE 25 MG/ML
12.5 INJECTION, SOLUTION INTRAMUSCULAR; INTRAVENOUS ONCE AS NEEDED
Status: DISCONTINUED | OUTPATIENT
Start: 2018-12-06 | End: 2018-12-06 | Stop reason: HOSPADM

## 2018-12-06 RX ORDER — SODIUM CHLORIDE, SODIUM LACTATE, POTASSIUM CHLORIDE, CALCIUM CHLORIDE 600; 310; 30; 20 MG/100ML; MG/100ML; MG/100ML; MG/100ML
125 INJECTION, SOLUTION INTRAVENOUS CONTINUOUS
Status: DISCONTINUED | OUTPATIENT
Start: 2018-12-06 | End: 2018-12-06 | Stop reason: HOSPADM

## 2018-12-06 RX ORDER — MEPERIDINE HYDROCHLORIDE 25 MG/ML
12.5 INJECTION INTRAMUSCULAR; INTRAVENOUS; SUBCUTANEOUS
Status: DISCONTINUED | OUTPATIENT
Start: 2018-12-06 | End: 2018-12-06 | Stop reason: HOSPADM

## 2018-12-06 RX ORDER — LIDOCAINE HYDROCHLORIDE 10 MG/ML
INJECTION, SOLUTION INFILTRATION; PERINEURAL AS NEEDED
Status: DISCONTINUED | OUTPATIENT
Start: 2018-12-06 | End: 2018-12-06 | Stop reason: SURG

## 2018-12-06 RX ORDER — ONDANSETRON 2 MG/ML
INJECTION INTRAMUSCULAR; INTRAVENOUS AS NEEDED
Status: DISCONTINUED | OUTPATIENT
Start: 2018-12-06 | End: 2018-12-06 | Stop reason: SURG

## 2018-12-06 RX ORDER — EPHEDRINE SULFATE 50 MG/ML
INJECTION, SOLUTION INTRAVENOUS AS NEEDED
Status: DISCONTINUED | OUTPATIENT
Start: 2018-12-06 | End: 2018-12-06 | Stop reason: SURG

## 2018-12-06 RX ORDER — PROPOFOL 10 MG/ML
INJECTION, EMULSION INTRAVENOUS AS NEEDED
Status: DISCONTINUED | OUTPATIENT
Start: 2018-12-06 | End: 2018-12-06 | Stop reason: SURG

## 2018-12-06 RX ADMIN — MIDAZOLAM HYDROCHLORIDE 2 MG: 1 INJECTION, SOLUTION INTRAMUSCULAR; INTRAVENOUS at 09:10

## 2018-12-06 RX ADMIN — DEXAMETHASONE SODIUM PHOSPHATE 4 MG: 10 INJECTION INTRAMUSCULAR; INTRAVENOUS at 09:30

## 2018-12-06 RX ADMIN — FENTANYL CITRATE 50 MCG: 50 INJECTION, SOLUTION INTRAMUSCULAR; INTRAVENOUS at 11:03

## 2018-12-06 RX ADMIN — EPHEDRINE SULFATE 10 MG: 50 INJECTION, SOLUTION INTRAMUSCULAR; INTRAVENOUS; SUBCUTANEOUS at 09:56

## 2018-12-06 RX ADMIN — CEFAZOLIN SODIUM 2000 MG: 2 SOLUTION INTRAVENOUS at 09:20

## 2018-12-06 RX ADMIN — FENTANYL CITRATE 50 MCG: 50 INJECTION, SOLUTION INTRAMUSCULAR; INTRAVENOUS at 09:40

## 2018-12-06 RX ADMIN — SODIUM CHLORIDE, SODIUM LACTATE, POTASSIUM CHLORIDE, AND CALCIUM CHLORIDE 125 ML/HR: .6; .31; .03; .02 INJECTION, SOLUTION INTRAVENOUS at 08:47

## 2018-12-06 RX ADMIN — PROPOFOL 150 MG: 10 INJECTION, EMULSION INTRAVENOUS at 09:25

## 2018-12-06 RX ADMIN — FENTANYL CITRATE 50 MCG: 50 INJECTION, SOLUTION INTRAMUSCULAR; INTRAVENOUS at 09:30

## 2018-12-06 RX ADMIN — FENTANYL CITRATE 50 MCG: 50 INJECTION, SOLUTION INTRAMUSCULAR; INTRAVENOUS at 10:39

## 2018-12-06 RX ADMIN — LIDOCAINE HYDROCHLORIDE 30 MG: 10 INJECTION, SOLUTION INFILTRATION; PERINEURAL at 09:25

## 2018-12-06 RX ADMIN — ONDANSETRON 4 MG: 2 INJECTION INTRAMUSCULAR; INTRAVENOUS at 09:30

## 2018-12-06 NOTE — H&P (VIEW-ONLY)
Assessment/Plan:  1  Complete tear of right rotator cuff  Ambulatory referral to Orthopedic Surgery    Supraspinatus tendon     Scribe Attestation    I,:   Ne Ortega Call am acting as a scribe while in the presence of the attending physician :        I,:   Óscar Lin MD personally performed the services described in this documentation    as scribed in my presence :          Reji Mcmahan has a small but full-thickness tear to the anterior leading edge of the supraspinatus tendon  I explained to him that this tear will likely worsen over time and retract  Most patients in his age group would choose to have the rotator cuff repaired as these tears typically will demonstrate increasing symptoms over time  He has already done physical therapy exercises and failed to demonstrate good relief  We also discussed that if we do see the loose bodies that were observed on MRI, then we will certainly remove those  We also discussed possible decompression if a spur is found  We discussed the procedure in detail  Recovery will be 4-6 months  He will require extensive rehabilitation and will be in a sling for the 1st 6 weeks following his surgery  We also discussed the risks of surgery  Risk of the surgery are inclusive of but not limited to bleeding, infection, nerve injury, blood clot, worsening of symptoms, not achieving the anticipated results, persistent stiffness, weakness and the need for additional surgery  The patient verbally stated he understood those risks and would like to proceed with the surgery  Our  will contact him to set up a date  Subjective:   Maykel Reddy is a 66 y o  male who has been referred to us by Dr Erlin Acosta for chronic right shoulder pain  Reji Mcmahan has been experiencing pain in the right shoulder for years originally dating back to 2012 when he injured the shoulder during [address]     Over the years he was experiencing intermittently painful episodes but unfortunately his pain has significantly worsened  Dr Bethany Macdonald ordered an MRI due to his chronic shoulder pain weakness and previous failure of conservative at measures such as physical therapy and cortisone injection  The MRI did return positive for a full-thickness rotator cuff tear to the anterior leading edge of the supraspinatus tendon  He is here today to discuss treatment options  His chief complaint is of the intermittent sharp and severe pain in the anterior lateral aspect of the right shoulder  His pain is exacerbated for reaching for objects or reaching overhead  Our movements behind his back also exacerbates his pain  He denies radiating pain or distal paresthesias  He is somewhat better with rest       Review of Systems   Constitutional: Negative for chills, fever and unexpected weight change  HENT: Negative for hearing loss, nosebleeds and sore throat  Eyes: Negative for pain, redness and visual disturbance  Respiratory: Negative for cough, shortness of breath and wheezing  Cardiovascular: Negative for chest pain, palpitations and leg swelling  Gastrointestinal: Negative for abdominal pain, nausea and vomiting  Endocrine: Negative for polyphagia and polyuria  Genitourinary: Negative for dysuria and hematuria  Musculoskeletal:        See HPI   Skin: Negative for rash and wound  Neurological: Negative for dizziness, numbness and headaches  Psychiatric/Behavioral: Negative for decreased concentration and suicidal ideas  The patient is not nervous/anxious            Past Medical History:   Diagnosis Date    Asthma     Near syncope     Sleep apnea, obstructive        Past Surgical History:   Procedure Laterality Date    ARTHROSCOPY KNEE      SINUS SURGERY         Family History   Problem Relation Age of Onset    Dementia Mother     Kidney disease Mother     Dementia Father     Rheumatologic disease Father     Other Father         rheumatism       Social History     Occupational History    Not on file  Social History Main Topics    Smoking status: Former Smoker     Packs/day: 0 50     Years: 15 00     Quit date: 2000    Smokeless tobacco: Never Used      Comment: smoked 3-4 days     Alcohol use No    Drug use: No    Sexual activity: Not on file         Current Outpatient Prescriptions:     Budesonide (PULMICORT FLEXHALER IN), Inhale, Disp: , Rfl:     esomeprazole (NexIUM) 20 mg capsule, , Disp: , Rfl: 0    fluticasone-vilanterol (BREO ELLIPTA) 200-25 MCG/INH inhaler, Inhale 1 puff daily Rinse mouth after use , Disp: 1 Inhaler, Rfl: 6    mometasone (NASONEX) 50 mcg/act nasal spray, instill 2 sprays into each nostril twice a day, Disp: , Rfl: 1    montelukast (SINGULAIR) 10 mg tablet, Take 1 tablet (10 mg total) by mouth daily at bedtime, Disp: 90 tablet, Rfl: 3    naproxen (EC NAPROSYN) 500 MG EC tablet, Take 1 tablet (500 mg total) by mouth 2 (two) times a day with meals, Disp: 60 tablet, Rfl: 0    rOPINIRole (REQUIP) 2 mg tablet, Take 1 tablet (2 mg total) by mouth daily at bedtime, Disp: 90 tablet, Rfl: 3    rosuvastatin (CRESTOR) 10 MG tablet, Take 1 tablet (10 mg total) by mouth daily, Disp: 30 tablet, Rfl: 3    VENTOLIN  (90 Base) MCG/ACT inhaler, Inhale 2 puffs every 4 (four) hours as needed for wheezing or shortness of breath, Disp: 3 Inhaler, Rfl: 3    No Known Allergies    Objective: There were no vitals filed for this visit  Right Shoulder Exam     Tenderness   The patient is experiencing no tenderness  Range of Motion   Active Abduction: 100   Forward Flexion: 90   External Rotation: 80   Internal Rotation 0 degrees: L5     Muscle Strength   Abduction: 4/5   Internal Rotation: 5/5   External Rotation: 4/5   Supraspinatus: 4/5   Subscapularis: 5/5     Tests   Impingement: positive    Other   Sensation: normal  Pulse: present (2+ radial)            Physical Exam   Constitutional: He is oriented to person, place, and time   He appears well-developed and well-nourished  HENT:   Head: Normocephalic and atraumatic  Eyes: Conjunctivae are normal  Right eye exhibits no discharge  Left eye exhibits no discharge  Neck: Normal range of motion  Neck supple  Cardiovascular: Regular rhythm, normal heart sounds and intact distal pulses  Pulmonary/Chest: Effort normal and breath sounds normal  No respiratory distress  Neurological: He is alert and oriented to person, place, and time  Skin: Skin is warm and dry  Psychiatric: He has a normal mood and affect  His behavior is normal    Vitals reviewed  I have personally reviewed pertinent films in PACS and my interpretation is as follows:  MRI of the right shoulder demonstrates a smal but full thickness tear to the anterior leading edge of the supraspinatus tendon

## 2018-12-06 NOTE — ANESTHESIA PROCEDURE NOTES
Peripheral Block    Patient location during procedure: pre-op  Start time: 12/6/2018 9:10 AM  Reason for block: at surgeon's request and post-op pain management  Staffing  Anesthesiologist: Dave Maciel  Performed: anesthesiologist   Preanesthetic Checklist  Completed: patient identified, site marked, surgical consent, pre-op evaluation, timeout performed, IV checked, risks and benefits discussed and monitors and equipment checked  Peripheral Block  Patient position: supine  Prep: ChloraPrep  Patient monitoring: heart rate  Block type: interscalene  Laterality: right  Injection technique: single-shot  Procedures: ultrasound guided  Ultrasound permanent image saved  Local infiltration: bupivacaine  Infiltration strength: 0 5 %  Dose: 15 mL  Needle  Needle type: Stimuplex   Needle gauge: 22 G  Needle length: 5 cm  Needle localization: ultrasound guidance  Assessment  Injection assessment: incremental injection and local visualized surrounding nerve on ultrasound  Paresthesia pain: immediately resolved  Post-procedure:  site cleaned  patient tolerated the procedure well with no immediate complications

## 2018-12-06 NOTE — DISCHARGE INSTRUCTIONS
Instruction Sheet Following RTC repair     Sling:   Wear your sling at all times after your surgery (this includes sleeping), except for when you are doing pendulum exercises, showering, or physical therapy  Additionally, you should not carry anything heavier than a pencil in your hand  Dressing:   Remove all cotton and yellow gauze 48 hours after your surgery  You do not need to put a new dressing on your wound; place Band-Aids on your wound  Showering: You may shower 48 hours after surgery  Please use CAUTION!! Be careful not to slip and fall  The effects of anesthesia and/or medication may make you drowsy or light-headed  Do not soak in a bathtub, hot tub, or pool until the doctor tells you it is O K , to do so  Once you are done showering pat the wound dry and apply a Band-Aid  While in the shower you must keep the arm across the front of the body as if it were still in the sling  Sleeping:   You will most likely have difficulty sleeping in the first few weeks after surgery  Most people find it more comfortable to sleep in a reclining position  You can either sleep in a recliner chair or create this position with pillows  Ice:   You can ice the shoulder to reduce swelling and discomfort  Do not ice the shoulder more than 20 minutes at a time  Let the shoulder warm up before reapplication  Avoid getting you wound wet  If you have a Cryocuff you may keep this on continuously  Follow-up visit:   You need to see the doctor about one week following surgery for your first post-op visit  At that time your sutures (stitches) will be removed  You will be given a prescription to begin physical therapy if you were not already given one  Common concerns:   Bruising and/or swelling of the shoulder, arm, or hand are common after surgery  To relieve this discomfort it is best to ice the shoulder  Please call if:   1  Any oozing or redness of the wound, fevers (>101 3°F), or chills       2  Any difficulty breathing or heaviness in the chest      REMEMBER - these are only guidelines for what to expect  If you have any questions or concerns, please do not hesitate to call the office  [phone]

## 2018-12-06 NOTE — ANESTHESIA PREPROCEDURE EVALUATION
Review of Systems/Medical History  Patient summary reviewed  Chart reviewed      Cardiovascular  Hyperlipidemia,    Pulmonary  Asthma , Sleep apnea ,        GI/Hepatic            Endo/Other     GYN       Hematology   Musculoskeletal       Neurology   Psychology           Physical Exam    Airway    Mallampati score: II  TM Distance: >3 FB  Neck ROM: full     Dental   No notable dental hx     Cardiovascular  Cardiovascular exam normal    Pulmonary  Pulmonary exam normal     Other Findings        Anesthesia Plan  ASA Score- 2     Anesthesia Type- general and regional with ASA Monitors  Additional Monitors:   Airway Plan:         Plan Factors-    Induction- intravenous  Postoperative Plan-     Informed Consent- Anesthetic plan and risks discussed with patient

## 2018-12-06 NOTE — PERIOPERATIVE NURSING NOTE
Returned from [facility] shoulder dsg dry and intact-sling in place and ice to shoulder-denies pain-expose fingers warm and moving-fluids offered

## 2018-12-06 NOTE — PERIOPERATIVE NURSING NOTE
Reviewed discharge instructions with both pt and wife-dsg dry and intact-sling in place with ice on intermittently-exposed fingers warm and moving-denies pain

## 2018-12-06 NOTE — ANESTHESIA PREPROCEDURE EVALUATION
Review of Systems/Medical History          Cardiovascular  Hyperlipidemia,    Pulmonary  Asthma , well controlled/ stable , Sleep apnea ,        GI/Hepatic            Endo/Other     GYN       Hematology   Musculoskeletal       Neurology   Psychology           Physical Exam    Airway    Mallampati score: I         Dental       Cardiovascular  Rhythm: regular, Rate: normal,     Pulmonary  Breath sounds clear to auscultation,     Other Findings        Anesthesia Plan  ASA Score- 3     Anesthesia Type- regional and general with ASA Monitors  Additional Monitors:   Airway Plan: LMA  Plan Factors-    Induction- intravenous  Postoperative Plan- Plan for postoperative opioid use  Informed Consent- Anesthetic plan and risks discussed with patient  I personally reviewed this patient with the CRNA  Discussed and agreed on the Anesthesia Plan with the CRNA  Deborah Briones

## 2018-12-06 NOTE — OP NOTE
OPERATIVE REPORT  PATIENT NAME: Katerin Jackson    :  1960  MRN: [id number]  Pt Location: [facility] OR ROOM 02    SURGERY DATE: 2018    Surgeon(s) and Role:     * Jesus Rashid MD - Primary     * Ananth Dodge PA-C - Assisting necessary for the procedure for assistance with minimally invasive arthroscopic techniques for rotator cuff repair    Preop Diagnosis:  Complete tear of right rotator cuff [M75 121]    Post-Op Diagnosis Codes:     * Complete tear of right rotator cuff [M75 121] and subacromial impingement    Procedure:  Right shoulder arthroscopy with rotator cuff repair utilizing speed bridge technique with 4 anchors and 8 sutures and subacromial decompression    Specimen(s):  * No specimens in log *    Estimated Blood Loss:   Minimal    Drains:       Anesthesia Type:   Regional with general    Operative Indications:  Complete tear of right rotator cuff Raul Buck is a 77-year-old male who has been suffering with right shoulder pain and weakness  He had an MRI demonstrating a large rotator cuff tear with also what appeared to be a small loose body although is uncertain on the MRI failed conservative measures and has significant pain and weakness and wished to undergo a right shoulder arthroscopy for rotator cuff repair and possible loose body removal   The risks are inclusive of but not limited to infection, stiffness, failure leave it discomfort, worsening of symptoms, recurrence of tear, failure to regain full strength and ability, and need for further surgery  He understood these risks and benefits and wished to proceed  Operative Findings:  Right shoulder with a stable exam under anesthesia achieving forward flexion and abduction each to 160°  External rotation was to 70° internal rotation was to 60°    Intra-articular findings demonstrated good appearance of articular cartilage in the glenohumeral joint and no obvious signs of loose bodies in the axillary pouch or anteriorly and a sub scapularis recess  The long head biceps tendon was intact and the superior and anterior and posterior labrum were all intact  The subscapularis tendon was intact  The supraspinatus tendon had an obvious large tear with some retraction  There was a type 3 subacromial spur requiring decompression for acromioplasty  We did perform a double row speed bridge technique repair for the rotator cuff tear which did come over very nicely onto a bleeding bed of bone  Complications:   None    Procedure and Technique:  Esperanza Jeffrey was taken to the operating room and placed supine on the OR table  He was given preoperative IV antibiotics and preoperative regional anesthesia by the attending anesthesiologist   General anesthesia was then induced and he was taken comfortably and safely into the beach chair position with all parts well padded and his head neutral in the head conner  The right shoulder was taken through exam under anesthesia as described above and right upper extremity was prepped and draped in usual sterile fashion  A surgical time-out was taken  Posterior portal was made with 11 blade  Diagnostic arthroscopy begun an anterior portal was made in the rotator interval with 11 blade  We demonstrated immediately a large rotator cuff tear of the supraspinatus tendon  We did look throughout the shoulder for loose bodies with did not find 1 in the subscapularis recess nor into the axillary pouch  The articular cartilage was pristine in the glenohumeral joint  No signs of superior or anterior or posterior labral tears and the long head of biceps was also intact  We then went to the subacromial space where a lateral portal was made with 11 blade  We did debride the edges of the supraspinatus tendon to a stable rim tissue with a shaver and also debrided the greater tuberosity to a bleeding bed of bone with use of a bur    We did demonstrate a type 3 subacromial spur and performed acromioplasty on that for decompression of this subacromial space with use of a bur  We then began a rotator cuff repair technique  This was a double row technique and we placed 2 medial row SwiveLock anchors and passed the FiberTape and FiberWire sutures from those anchors in horizontal mattress fashion  We then took 1 FiberTape suture from each medial row anchor and placed them into an anterior lateral row anchor  We then took the FiberWire suture from that anchor and placed that into the anterior leading edge of the tendon and tied that down with arthroscopic knot tying techniques  We then took the remaining 2 FiberTape sutures from the medial row and placed them into a posterior lateral row anchor  We then took the FiberWire suture from that anchor and passed that with simple fashion into the posterior aspect of the rotator cuff  We tied that down with arthroscopic knot tying techniques  We then tied the medial row FiberWire sutures that had been placed previously  We probed the tendon and found it to be very nicely fixated  We then removed the arthroscopic equipment and closed the portals with 4-0 nylon suture  Dry, sterile dressings were applied with a sling  He tolerated the procedure well and transferred to recovery room stable condition  He will follow up with me in 1 week for suture removal   He will be on the rotator cuff repair rehabilitation protocol     I was present for the entire procedure and A qualified resident physician was not available    Patient Disposition:  PACU     SIGNATURE: Emily Mcrae MD  DATE: December 6, 2018  TIME: 10:19 AM

## 2018-12-14 ENCOUNTER — OFFICE VISIT (OUTPATIENT)
Dept: OBGYN CLINIC | Facility: CLINIC | Age: 58
End: 2018-12-14

## 2018-12-14 VITALS
HEIGHT: 72 IN | HEART RATE: 64 BPM | BODY MASS INDEX: 28.77 KG/M2 | WEIGHT: 212.4 LBS | SYSTOLIC BLOOD PRESSURE: 157 MMHG | DIASTOLIC BLOOD PRESSURE: 88 MMHG

## 2018-12-14 DIAGNOSIS — M75.121 COMPLETE TEAR OF RIGHT ROTATOR CUFF: Primary | ICD-10-CM

## 2018-12-14 PROCEDURE — 99024 POSTOP FOLLOW-UP VISIT: CPT | Performed by: ORTHOPAEDIC SURGERY

## 2018-12-14 RX ORDER — AMOXICILLIN AND CLAVULANATE POTASSIUM 875; 125 MG/1; MG/1
TABLET, FILM COATED ORAL
Refills: 0 | COMMUNITY
Start: 2018-12-05 | End: 2020-03-16

## 2018-12-14 NOTE — PROGRESS NOTES
Patient Name:  Yuval Farr  MRN:  [id number]    Assessment     1  Complete tear of right rotator cuff         Plan     1  James Easton upon examination appears to be doing as expected 1 week after right shoulder arthroscopy with rotator cuff repair and subacromial decompression  His portal sits are clean dry and intact with no erythema or signs of infection  His sensation is intact in his upper arm, forearm, wrist, and hand  He does have a 2+ radial pulse  I had a long comprehensive discussion with James Easton in regards to not providing a 2nd prescription of narcotics  as he notes that he is still experiencing painful symptoms at night  I repeatedly explained to him that the [facility]  [place], including the [address] [place] [address], and state government recommend limiting prescription narcotics postoperatively  These recommendations are based upon research that had been done which demonstrate that taking narcotic medications for more than 3 days greatly increases a person's risk of addiction to narcotics  I repeatedly noted that I will not prescribe him another prescription for narcotics as this would be a disservice to his health  I do have concern as James Easton repeatedly inquired about narcotic medication and consistently questioned my advisement and was demeaning in his remarks  This puts up a red flag that to he is narcotic seeking  I also have concern as he noted that he has a family member that is consistently prescribed with oxycodone for chronic pain, and have concern that he may seek the use of these medications  I did discourage him from partaking in any use of his family member's medication  I did suggest that he continue with his over-the-counter pain relievers such as Aleve and Tylenol for pain control  He is also encouraged to ice the shoulder as he continues to demonstrate swelling  However, again I reiterated that I will not prescribe him another round of narcotics for pain control    I did note to him that as the weeks go on the pain should subside as he continues to heal   I did reiterate to him that possibly there was a misunderstanding on pain level expectation following the surgery  I noted that the rotator cuff repair is 1 of the most painful procedures that one can undergo in Orthopedics  He did however look quite comfortable to me today in the office  He did not at all appear to be in significant discomfort today  We did adjust his sling for him as well  I discussed with Joseluis Lewis that would like him to remain in the sling for another 5 weeks  As this is the typical length that he is to be immobilized in the sling  He does have physical therapy scheduled  I discouraged him from laying down prior to the 6 week shobha  I would like him to follow up with me in 6 weeks for repeat evaluation        Subjective     Joseluis Lewis is a 72-year-old male who is 1 week status post right shoulder arthroscopy with rotator cuff repair and subacromial decompression  States that he is experiencing intermittent and mild to moderate deep aching about the anterior lateral aspect of her shoulder  He notes that he will experiences pain throughout the day however is increases at night  He notes that the pain can radiate distally into the elbow  He also notes pain the medial aspect of his upper arm  He does appreciate tightness into the upper trapezius on the right side as well as into the posterior aspect of the shoulder  Today he denies any distal paresthesias  Objective     /88   Pulse 64   Ht 6' (1 829 m)   Wt 96 3 kg (212 lb 6 4 oz)   BMI 28 81 kg/m²     Portal sites are clean dry intact with no erythema or signs infection  Does demonstrate point tenderness in the anterior lateral and posterior aspect of the shoulder  As well as into the upper trapezius muscles  He does demonstrate ecchymosis into the medial aspect of the upper arm and the biceps  This is also tender    He does have sensation the upper arm, forearm, wrist, and hand  2+ radial pulse              Scribe Attestation    I,:   Ashanti Barrett am acting as a scribe while in the presence of the attending physician :        I,:   Óscar Lin MD personally performed the services described in this documentation    as scribed in my presence :

## 2018-12-20 ENCOUNTER — EVALUATION (OUTPATIENT)
Dept: PHYSICAL THERAPY | Facility: CLINIC | Age: 58
End: 2018-12-20
Payer: MEDICARE

## 2018-12-20 DIAGNOSIS — M75.121 COMPLETE TEAR OF RIGHT ROTATOR CUFF: Primary | ICD-10-CM

## 2018-12-20 PROCEDURE — 97140 MANUAL THERAPY 1/> REGIONS: CPT

## 2018-12-20 PROCEDURE — G8985 CARRY GOAL STATUS: HCPCS

## 2018-12-20 PROCEDURE — 97161 PT EVAL LOW COMPLEX 20 MIN: CPT

## 2018-12-20 PROCEDURE — G8984 CARRY CURRENT STATUS: HCPCS

## 2018-12-20 NOTE — PROGRESS NOTES
PT Evaluation     Today's date: 2018  Patient name: Reginald Ellis  : 1960  MRN: [id number]  Referring provider: Alhaji Ambrose MD  Dx:   Encounter Diagnosis     ICD-10-CM    1  Complete tear of right rotator cuff M75 121 Ambulatory referral to Physical Therapy    Supraspinatus tendon                  Assessment  Assessment details: Reginald Ellis is a 66 y o  male who presents with pain, decreased strength, decreased ROM and decreased joint mobility  Due to these impairments, patient has difficulty performing ADL's, recreational activities, work-related activities, lifting/carrying, reaching  Patient's clinical presentation is consistent with their referring diagnosis of Complete tear of right rotator cuff  Comment: Supraspinatus tendon  Plan: Ambulatory referral to Physical Therapy    Patient has been educated in post-op contraindications / precautions, home exercise program and plan of care  Patient would benefit from skilled physical therapy services to address their aforementioned functional limitations and progress towards prior level of function and independence with home exercise program      Impairments: abnormal muscle firing, abnormal or restricted ROM, activity intolerance, impaired physical strength, lacks appropriate home exercise program and pain with function  Understanding of Dx/Px/POC: good   Prognosis: good    Goals  Short Term Goals: Target Date 19  1  Initiate and advance HEP  2  Restore full right shoulder PROM  3  Decrease c/o pain to 3/10 at worst    Long Term Goals: Target Date 19  1  Indep with HEP  2  Full pain free A/PROM  3  Increase right shoulder strength to 4/5 at least so that pt can lift 10lbs overhead without pain or substitution  4   Return to previous level of activity    Plan  Patient would benefit from: skilled PT  Planned modality interventions: cryotherapy, electrical stimulation/Russian stimulation and thermotherapy: hydrocollator packs  Planned therapy interventions: joint mobilization, manual therapy, patient education, postural training, activity modification, abdominal trunk stabilization, body mechanics training, flexibility, functional ROM exercises, graded exercise, home exercise program, neuromuscular re-education, strengthening, stretching, therapeutic activities, therapeutic exercise, motor coordination training, muscle pump exercises, gait training, balance/weight bearing training and ADL training  Frequency: 3x week  Plan of Care beginning date: 2018  Plan of Care expiration date: 3/20/2019  Treatment plan discussed with: patient        Subjective Evaluation    History of Present Illness  Date of surgery: 2018  Mechanism of injury: Pt presents to IE s/p  R RTC repair  Pt reports R shoulder pain started after lifting a heavy generator approx 5 years ago, received cortisone injection with good pain relief that lasted until last March  Pain progressively worsened and limiting activity kumar/phyiscal ability which lead him seek evaluation with Dr Chasidy Aden  MRI confirmed RTC tear  Surgery performed  without complication  Not a recurrent problem   Quality of life: good    Pain  Current pain ratin  At best pain ratin  At worst pain ratin  Quality: dull ache and sharp    Hand dominance: right    Patient Goals  Patient goals for therapy: increased strength and decreased pain  Patient goal: return to everything before incident        Objective    Shoulder PROM Left  Right  Flexion   WNL 75  Abduction  WNL TBA at week 3 as per precautions  Int Rot   WNL 30 in scapular plane  Ext Rot   WNL 20 in scapular plane    Shoulder AROM & MMT : N/A due to precautions     * Denotes Pain      Portal incisions intact, dry and well healing  Moderate bruising noted t/o biceps region       Precautions: Post-op protocol    Daily Treatment Diary     Manual              PROM within precautions 10 min Exercise Diary  12/20                                      Pendulums 2'                                                                                                                                                                                                                                             Modalities  12/20

## 2018-12-26 ENCOUNTER — APPOINTMENT (OUTPATIENT)
Dept: PHYSICAL THERAPY | Facility: CLINIC | Age: 58
End: 2018-12-26
Payer: MEDICARE

## 2018-12-27 ENCOUNTER — OFFICE VISIT (OUTPATIENT)
Dept: PHYSICAL THERAPY | Facility: CLINIC | Age: 58
End: 2018-12-27
Payer: MEDICARE

## 2018-12-27 DIAGNOSIS — M75.121 COMPLETE TEAR OF RIGHT ROTATOR CUFF: Primary | ICD-10-CM

## 2018-12-27 PROCEDURE — 97112 NEUROMUSCULAR REEDUCATION: CPT

## 2018-12-27 PROCEDURE — 97110 THERAPEUTIC EXERCISES: CPT

## 2018-12-27 NOTE — PROGRESS NOTES
Daily Note     Today's date: 2018  Patient name: Vee Gaona  : 1960  MRN: [id number]  Referring provider: Mihaela Servin MD  Dx:   Encounter Diagnosis     ICD-10-CM    1  Complete tear of right rotator cuff M75 121               Subjective: Reports no pain currently  Felt good after last session and when doing HEP  Objective: See treatment diary below    Visit #: 2    TherEx: 15'  Pendulums A/P and M/L 30"x2 each  Seated flexion table slides 10"x10  Sup elbow flex/ext ROM 2x10   Sup PROM ER in neutral w/ cane 2x10/5"    Manual: 10'  Grade 1-2 GHJ mobs/oscillation   PROM/manual stretching as per protocol     CP 10' to end     Assessment: Tolerated treatment well and demo good kumar to initiation of POC with regards to protocol  VC t/o session for proper excercise form and to avoid muscle guarding during PROM  Patient would benefit from continued PT  Plan: Continue per plan of care

## 2018-12-28 ENCOUNTER — OFFICE VISIT (OUTPATIENT)
Dept: PHYSICAL THERAPY | Facility: CLINIC | Age: 58
End: 2018-12-28
Payer: MEDICARE

## 2018-12-28 DIAGNOSIS — M75.121 COMPLETE TEAR OF RIGHT ROTATOR CUFF: Primary | ICD-10-CM

## 2018-12-28 PROCEDURE — 97110 THERAPEUTIC EXERCISES: CPT

## 2018-12-28 PROCEDURE — 97140 MANUAL THERAPY 1/> REGIONS: CPT

## 2018-12-28 NOTE — PROGRESS NOTES
Daily Note     Today's date: 2018  Patient name: Alber Hook  : 1960  MRN: [id number]  Referring provider: Adria Rosado MD  Dx:   Encounter Diagnosis     ICD-10-CM    1  Complete tear of right rotator cuff M75 121               Subjective: Pt reports inc discomfort after last session that dec over the course of the day  No pain currently  Running 15' late today  Objective: See treatment diary below    Visit #: 3    TherEx: 15'  Pendulums A/P and M/L 1'x2 each  Seated flexion table slides 10"x10  Sup elbow flex/ext ROM 2x10 ea  Sup PROM ER in neutral w/ cane 2x10/5"  Sup PROM flexion w/ opp UE 6x : attempted with poor kumar -hold exercise     Manual: 10'  Grade 1-2 GHJ mobs/oscillation   PROM/manual stretching as per protocol     CP 10' to end     Assessment: Tolerated treatment well and w/ no c/o t/o session  Patient cont to demo muscle guarding leading to inc discomfort during manual stretching despite VC/manual techniques utilized to relax shoulder complex  Patient would benefit from continued PT  Plan: Continue per plan of care  Progress treament per protocol

## 2019-01-02 ENCOUNTER — OFFICE VISIT (OUTPATIENT)
Dept: PHYSICAL THERAPY | Facility: CLINIC | Age: 59
End: 2019-01-02
Payer: MEDICARE

## 2019-01-02 DIAGNOSIS — M75.121 COMPLETE TEAR OF RIGHT ROTATOR CUFF: Primary | ICD-10-CM

## 2019-01-02 PROCEDURE — 97110 THERAPEUTIC EXERCISES: CPT

## 2019-01-02 NOTE — PROGRESS NOTES
Daily Note     Today's date: 2019  Patient name: Oscar Brush  : 1960  MRN: [id number]  Referring provider: Carmen Marie MD  Dx:   Encounter Diagnosis     ICD-10-CM    1  Complete tear of right rotator cuff M75 121               Subjective: Pt reports pulling/tightness anterior shoulder into upper arm  Objective: See treatment diary below    Visit #: 3    TherEx: 15'  Seated flexion table slides 5" 2x10  Pendulums AP and ML 1'x2 each  Sup elbow flex/ext ROM 2x10 ea  Sup PROM ER in neutral w/ cane 10"x10  Sup PROM flexion w/ opp UE 2x10    Manual: 10'  Grade 1-2 GHJ mobs/oscillation   PROM/manual stretching as per protocol     CP 10' to end     Assessment: Tolerated treatment well and w/ no c/o t/o session  Patient demo improved kumar to manual stretching with flexion PROM improved since last session  Patient would benefit from continued PT  Plan: Continue per plan of care  Progress treament per protocol

## 2019-01-08 ENCOUNTER — OFFICE VISIT (OUTPATIENT)
Dept: PHYSICAL THERAPY | Facility: CLINIC | Age: 59
End: 2019-01-08
Payer: MEDICARE

## 2019-01-08 DIAGNOSIS — M75.121 COMPLETE TEAR OF RIGHT ROTATOR CUFF: Primary | ICD-10-CM

## 2019-01-08 PROCEDURE — 97140 MANUAL THERAPY 1/> REGIONS: CPT

## 2019-01-08 PROCEDURE — 97110 THERAPEUTIC EXERCISES: CPT

## 2019-01-10 ENCOUNTER — OFFICE VISIT (OUTPATIENT)
Dept: PHYSICAL THERAPY | Facility: CLINIC | Age: 59
End: 2019-01-10
Payer: MEDICARE

## 2019-01-10 DIAGNOSIS — M75.121 COMPLETE TEAR OF RIGHT ROTATOR CUFF: Primary | ICD-10-CM

## 2019-01-10 PROCEDURE — 97140 MANUAL THERAPY 1/> REGIONS: CPT

## 2019-01-10 PROCEDURE — 97110 THERAPEUTIC EXERCISES: CPT

## 2019-01-10 NOTE — PROGRESS NOTES
Daily Note     Today's date: 1/10/2019  Patient name: Oscar Brush  : 1960  MRN: [id number]  Referring provider: Carmen Marie MD  Dx:   Encounter Diagnosis     ICD-10-CM    1  Complete tear of right rotator cuff M75 121               Subjective: Pt reports less pain compared to last session  "Hand feels cold, wrist feels stiff"    Objective: See treatment diary below    Visit # 5    TherEx: 15'  Seated flexion table slides 5" 2x10  Pendulums AP and ML 1'x2 each  Digiflex elb flex/ext 1'each   Stand elbow flex/ext ROM 20x  5 way (flex, ext, abd, IR, ER) shoulder ISO w/ ball 5"x10 each  Sup PROM ER in neutral w/ cane 10"x10  Sup PROM flexion w/ opp UE 2x10    Manual: 10'  Grade 1-2 GHJ mobs/oscillation   PROM/manual stretching as per protocol     CP 10' to end     Assessment: Tolerated treatment well and w/ no c/o t/o session  Patient would benefit from continued PT  Plan: Continue per plan of care  Progress treament per protocol

## 2019-01-15 ENCOUNTER — OFFICE VISIT (OUTPATIENT)
Dept: PHYSICAL THERAPY | Facility: CLINIC | Age: 59
End: 2019-01-15
Payer: MEDICARE

## 2019-01-15 DIAGNOSIS — M75.121 COMPLETE TEAR OF RIGHT ROTATOR CUFF: Primary | ICD-10-CM

## 2019-01-15 PROCEDURE — 97140 MANUAL THERAPY 1/> REGIONS: CPT

## 2019-01-15 PROCEDURE — 97110 THERAPEUTIC EXERCISES: CPT

## 2019-01-15 NOTE — PROGRESS NOTES
Daily Note     Today's date: 1/15/2019  Patient name: Alexandrea Easley  : 1960  MRN: [id number]  Referring provider: Becca Delgado MD  Dx:   Encounter Diagnosis     ICD-10-CM    1  Complete tear of right rotator cuff M75 121               Subjective: Pt reports discomfort rated 2/10 in shoulder today  Cont sling usage  Objective: See treatment diary below    Visit #7    TherEx: 30'  Pulleys flexion 5'  Seated flexion/abduction table slides 5"/2x10  Digiflex elb flex/ext 30x each   Stand elbow flex/ext ROM 20x  Sup PROM flexion w/ opp UE 2x10  Sup PROM ER in neutral w/ cane 10"x10     Manual: 15'  Grade 2-3 GHJ mobs  PROM/manual stretching as per protocol     PROM: shoulder flex: 116    abduction: 85    ER at neutral: 25    CP 10' to end     HEP: added flex/abd table slides, A-AROM flex, ER w/ cane neutrl    Assessment: Tolerated treatment well  Pt progressing slowly with regards to shoulder PROM  Patient would benefit from continued PT  Plan: Continue per plan of care  Progress treament per protocol

## 2019-01-17 ENCOUNTER — EVALUATION (OUTPATIENT)
Dept: PHYSICAL THERAPY | Facility: CLINIC | Age: 59
End: 2019-01-17
Payer: MEDICARE

## 2019-01-17 DIAGNOSIS — M75.121 COMPLETE TEAR OF RIGHT ROTATOR CUFF: Primary | ICD-10-CM

## 2019-01-17 PROCEDURE — G8984 CARRY CURRENT STATUS: HCPCS

## 2019-01-17 PROCEDURE — 97110 THERAPEUTIC EXERCISES: CPT

## 2019-01-17 PROCEDURE — G8985 CARRY GOAL STATUS: HCPCS

## 2019-01-17 PROCEDURE — 97140 MANUAL THERAPY 1/> REGIONS: CPT

## 2019-01-17 NOTE — PROGRESS NOTES
PT Re-Evaluation     Today's date: 2019  Patient name: Stacie Hughes  : 1960  MRN: [id number]  Referring provider: Renetta Taylor MD  Dx:   Encounter Diagnosis     ICD-10-CM    1  Complete tear of right rotator cuff M75 121                   Assessment  Assessment details: Stacie Hughes is a 66 y o  male who has been participating in skilled PT for approx 1 month and has had 8 sessions since [facility] s/p R RTC repair  Pt has demo steady progress towards est LTG's but cont to present with pain, decreased strength, decreased ROM and decreased joint mobility consistent w/ post op protocol  Due to these impairments, patient has difficulty performing ADL's, recreational activities, work-related activities, lifting/carrying, reaching  Patient's clinical presentation is consistent with their referring diagnosis of Complete tear of right rotator cuff  Comment: Supraspinatus tendon  Plan: Ambulatory referral to Physical Therapy    Patient has been educated in post-op contraindications / precautions, home exercise program and plan of care  Patient would benefit from skilled physical therapy services to address their aforementioned functional limitations and progress towards prior level of function and independence with home exercise program      Impairments: abnormal muscle firing, abnormal or restricted ROM, activity intolerance, impaired physical strength, lacks appropriate home exercise program and pain with function  Understanding of Dx/Px/POC: good   Prognosis: good    Goals  Short Term Goals: Target Date 19  1  Initiate and advance HEP (met)  2  Restore full right shoulder PROM (ongoing/not met)  3  Decrease c/o pain to 3/10 at worst (not met)    Long Term Goals: Target Date 19  1  Indep with HEP (ongoing)  2  Full pain free A/PROM (ongoing)  3  Increase right shoulder strength to 4/5 at least so that pt can lift 10lbs overhead without pain or substitution (not met)  4   Return to previous level of activity (not met)    Plan  Patient would benefit from: skilled PT  Planned modality interventions: cryotherapy, electrical stimulation/Russian stimulation and thermotherapy: hydrocollator packs  Planned therapy interventions: joint mobilization, manual therapy, patient education, postural training, activity modification, abdominal trunk stabilization, body mechanics training, flexibility, functional ROM exercises, graded exercise, home exercise program, neuromuscular re-education, strengthening, stretching, therapeutic activities, therapeutic exercise, motor coordination training, muscle pump exercises, gait training, balance/weight bearing training and ADL training  Frequency: 3x week  Plan of Care beginning date: 2018  Plan of Care expiration date: 3/20/2019  Treatment plan discussed with: patient        Subjective Evaluation    History of Present Illness  Date of surgery: 2018  Mechanism of injury: Pt has completed approx 1 month of PT at this time  Reports 50% improvement with regards to functional level since [facility] but remains below PLOF due to surgical precautions and post op protocol  Began sleeping in bed propped up w/ pillows but is still moderately disturbed  Follow up w/ ortho 19  Cont sling usage until follow up  Not a recurrent problem   Quality of life: good    Pain  Current pain ratin  At best pain ratin  At worst pain ratin  Quality: dull ache and tight    Hand dominance: right    Patient Goals  Patient goals for therapy: increased strength and decreased pain  Patient goal: return to everything before incident        Objective    Shoulder PROM Left  Right  Flexion     Abduction  WNL 85  Int Rot   WNL 35 in scapular plane  Ext Rot  WNL 40 in scapular plane    Joint mobility: Hypomobility noted GHJ w/ posterior and inferior most restricted       End range pain and firm end feel w/ all motions    Shoulder AROM & MMT: N/A due to precautions     Portal incisions intact, dry and well healing     Nil bruising/ecchymosis      Precautions: Post-op protocol    Daily Treatment Diary     Visit #8    TherEx: 30'  Pulleys flexion 5'  Seated flexion/abduction table slides 5"/2x10  Sup PROM flexion w/ opp UE 2x10  Sup PROM ER in neutral w/ cane 10"x10     Manual: 15'  Grade 2-3 GHJ mobs  PROM/manual stretching as per protocol     CP 10' to end     HEP: Cont flex/abd table slides, A-AROM flex, ER w/ cane neutral

## 2019-01-22 ENCOUNTER — OFFICE VISIT (OUTPATIENT)
Dept: PHYSICAL THERAPY | Facility: CLINIC | Age: 59
End: 2019-01-22
Payer: MEDICARE

## 2019-01-22 DIAGNOSIS — M75.121 COMPLETE TEAR OF RIGHT ROTATOR CUFF: Primary | ICD-10-CM

## 2019-01-22 PROCEDURE — 97140 MANUAL THERAPY 1/> REGIONS: CPT

## 2019-01-22 PROCEDURE — 97110 THERAPEUTIC EXERCISES: CPT

## 2019-01-22 NOTE — PROGRESS NOTES
Daily Note     Today's date: 2019  Patient name: Yi Davis  : 1960  MRN: [id number]  Referring provider: Eduin Ulloa MD  Dx:   Encounter Diagnosis     ICD-10-CM    1  Complete tear of right rotator cuff M75 121               Subjective: Pt reports significant stiffness  Objective: See treatment diary below    Visit #9    MH to start 10'     TherEx: 30'  Pulleys flexion 5'  Seated flexion/abduction/ER table slides 10"x10 each  Stand cane abd 2x10   Stand cane ER @ neutral 10"x10  OH flex w/ cane 20x3''  Sup PROM ER in neutral w/ cane 10"x10   Sup PROM flexion w/ opp UE 2x10    Manual: 15'  Grade 3-4 GHJ mobs/distrcation,  PROM/manual stretching as per protocol     CP 10' to end   Skin intact pre/post    HEP: Cont flex/abd table slides, A-AROM flex, ER w/ cane neutral      Assessment: Tolerated treatment well  Progressed A-AROM/stretching w/ good tolerance and mild discomfort  Stiffness and tightness persist limiting full PROM  Patient exhibited good technique with therapeutic exercises and would benefit from continued PT  Plan: Continue per plan of care  Progress treament per protocol

## 2019-01-24 ENCOUNTER — OFFICE VISIT (OUTPATIENT)
Dept: PHYSICAL THERAPY | Facility: CLINIC | Age: 59
End: 2019-01-24
Payer: MEDICARE

## 2019-01-24 DIAGNOSIS — M75.121 COMPLETE TEAR OF RIGHT ROTATOR CUFF: Primary | ICD-10-CM

## 2019-01-24 PROCEDURE — 97110 THERAPEUTIC EXERCISES: CPT

## 2019-01-24 PROCEDURE — 97140 MANUAL THERAPY 1/> REGIONS: CPT

## 2019-01-24 NOTE — PROGRESS NOTES
Daily Note     Today's date: 2019  Patient name: Ashwini Jacob  : 1960  MRN: [id number]  Referring provider: Joyce Burton MD  Dx:   Encounter Diagnosis     ICD-10-CM    1  Complete tear of right rotator cuff M75 121               Subjective: Pt reports slight pain after increased stretching last session  "I want to push through it "     Objective: See treatment diary below    Visit #10    [facility] to start 5'   Skin intact pre/post    TherEx: 15'  Pulleys flexion 5'  Stand cane abd 2x10   Stand cane ER @ neutral 10"x10  Seated flexion/abduction/ER table slides 10"x10 each  OH flex w/ cane 20x3''  Sup PROM ER in neutral w/ cane 10"x10   Sup PROM flexion w/ opp UE 2x10    Manual: 15'  Grade 3-4 GHJ mobs/distrcation,  PROM/manual stretching as per protocol     Passive abd: 125 deg  Flexion w/ pulleys: 150 deg    CP 10' to end   Skin intact pre/post    HEP: Cont flex/abd table slides, A-AROM flex, ER w/ cane neutral        Assessment: Tolerated treatment well  Stiffness and tightness persist limiting full PROM, but improving  Patient exhibited good technique with therapeutic exercises and would benefit from continued PT  Plan: Continue per plan of care  Progress treament per protocol

## 2019-01-25 ENCOUNTER — OFFICE VISIT (OUTPATIENT)
Dept: OBGYN CLINIC | Facility: CLINIC | Age: 59
End: 2019-01-25

## 2019-01-25 VITALS
HEIGHT: 72 IN | WEIGHT: 213.6 LBS | SYSTOLIC BLOOD PRESSURE: 134 MMHG | DIASTOLIC BLOOD PRESSURE: 87 MMHG | HEART RATE: 79 BPM | BODY MASS INDEX: 28.93 KG/M2

## 2019-01-25 DIAGNOSIS — Z98.890 STATUS POST RIGHT ROTATOR CUFF REPAIR: Primary | ICD-10-CM

## 2019-01-25 PROCEDURE — 99024 POSTOP FOLLOW-UP VISIT: CPT | Performed by: ORTHOPAEDIC SURGERY

## 2019-01-25 RX ORDER — VALACYCLOVIR HYDROCHLORIDE 500 MG/1
500 TABLET, FILM COATED ORAL 2 TIMES DAILY
Refills: 0 | COMMUNITY
Start: 2019-01-24 | End: 2022-01-03

## 2019-01-25 NOTE — PROGRESS NOTES
Assessment/Plan:  1  Status post right rotator cuff repair         The patient is doing well and is close to within normal limits for this far postoperatively  We encouraged him to be patient  He will continue physical therapy on the rotator cuff repair protocol  We did review his physical therapy notes thus far  Encourage him to do his exercises at home on his days off from physical therapy  He will still refrain from any heavy lifting  We will see him back in 6 weeks for repeat evaluation  Subjective:   Wilner Ruiz is a 66 y o  male who presents today for follow-up of his right shoulder, now about 7 weeks status post rotator cuff repair and subacromial decompression  He has been out of his sling for week or so now  He feels he is making slow progress with physical therapy  He is a bit concerned that he may be a bit too stiff  He notes some mild soreness about the shoulder with therapy exercises that improves with rest   He notes good sensation of the right upper extremity        Review of Systems      Past Medical History:   Diagnosis Date    Asthma     Hyperlipidemia     Nasal polyps     Near syncope     Sleep apnea, obstructive        Past Surgical History:   Procedure Laterality Date    ARTHROSCOPY KNEE      COLONOSCOPY      NH SHLDR ARTHROSCOP,SURG,W/REMOVAL,LOOSE/FB Right 12/6/2018    Procedure: ARTHROSCOPIC SHOULDER;  Surgeon: Jonathan Viramontes MD;  Location: [facility] OR;  Service: Orthopedics    NH SHLDR ARTHROSCOP,SURG,W/ROTAT CUFF REPR Right 12/6/2018    Procedure: REPAIR ROTATOR CUFF  ARTHROSCOPIC WITH POSSIBLE REMOVAL LOOSE BODIES AND POSSIBLE DECOMPRESSION;  Surgeon: Jonathan Viramontes MD;  Location: [facility] OR;  Service: Orthopedics    SINUS SURGERY         Family History   Problem Relation Age of Onset    Dementia Mother     Kidney disease Mother     Dementia Father     Rheumatologic disease Father     Other Father         rheumatism    No Known Problems Sister  No Known Problems Brother     No Known Problems Maternal Aunt     No Known Problems Maternal Uncle     No Known Problems Paternal Aunt     No Known Problems Paternal Uncle     No Known Problems Maternal Grandmother     No Known Problems Maternal Grandfather     No Known Problems Paternal Grandmother     No Known Problems Paternal Grandfather     ADD / ADHD Neg Hx     Anesthesia problems Neg Hx     Cancer Neg Hx     Clotting disorder Neg Hx     Collagen disease Neg Hx     Diabetes Neg Hx     Dislocations Neg Hx     Learning disabilities Neg Hx     Neurological problems Neg Hx     Osteoporosis Neg Hx     Scoliosis Neg Hx     Vascular Disease Neg Hx        Social History     Occupational History    Not on file       Social History Main Topics    Smoking status: Former Smoker     Packs/day: 0 50     Years: 15 00     Quit date: 2000    Smokeless tobacco: Never Used      Comment: smoked 3-4 days     Alcohol use No    Drug use: No    Sexual activity: Not on file         Current Outpatient Prescriptions:     amoxicillin-clavulanate (AUGMENTIN) 875-125 mg per tablet, take 1 tablet by mouth twice a day for 14 days, Disp: , Rfl: 0    esomeprazole (NexIUM) 20 mg capsule, 40 mg daily in the early morning  , Disp: , Rfl: 0    fluticasone-vilanterol (BREO ELLIPTA) 200-25 MCG/INH inhaler, Inhale 1 puff daily Rinse mouth after use , Disp: 3 Inhaler, Rfl: 3    mometasone (NASONEX) 50 mcg/act nasal spray, instill 2 sprays into each nostril twice a day, Disp: , Rfl: 1    montelukast (SINGULAIR) 10 mg tablet, Take 1 tablet (10 mg total) by mouth daily at bedtime (Patient taking differently: Take 10 mg by mouth every morning  ), Disp: 90 tablet, Rfl: 3    Multiple Vitamin (MULTIVITAMIN) tablet, Take 1 tablet by mouth daily, Disp: , Rfl:     naproxen (EC NAPROSYN) 500 MG EC tablet, Take 1 tablet (500 mg total) by mouth 2 (two) times a day with meals, Disp: 60 tablet, Rfl: 0    rOPINIRole (REQUIP) 2 mg tablet, Take 1 tablet (2 mg total) by mouth daily at bedtime, Disp: 90 tablet, Rfl: 3    rosuvastatin (CRESTOR) 10 MG tablet, Take 1 tablet (10 mg total) by mouth daily (Patient taking differently: Take 10 mg by mouth daily at bedtime  ), Disp: 30 tablet, Rfl: 3    VENTOLIN  (90 Base) MCG/ACT inhaler, Inhale 2 puffs every 4 (four) hours as needed for wheezing or shortness of breath, Disp: 3 Inhaler, Rfl: 3    valACYclovir (VALTREX) 500 mg tablet, Take 500 mg by mouth 2 (two) times a day, Disp: , Rfl: 0    No Known Allergies    Objective:  Vitals:    01/25/19 1115   BP: 134/87   Pulse: 79       Right Shoulder Exam     Tenderness   The patient is experiencing no tenderness  Range of Motion   Extension: 40   Forward Flexion: 90   Internal Rotation 0 degrees: Sacrum     Muscle Strength   Right shoulder normal muscle strength: Strength testing deferred      Other   Erythema: absent  Sensation: normal  Pulse: present            Physical Exam

## 2019-01-28 ENCOUNTER — OFFICE VISIT (OUTPATIENT)
Dept: PHYSICAL THERAPY | Facility: CLINIC | Age: 59
End: 2019-01-28
Payer: MEDICARE

## 2019-01-28 DIAGNOSIS — M75.121 COMPLETE TEAR OF RIGHT ROTATOR CUFF: Primary | ICD-10-CM

## 2019-01-28 PROCEDURE — 97140 MANUAL THERAPY 1/> REGIONS: CPT

## 2019-01-28 PROCEDURE — 97110 THERAPEUTIC EXERCISES: CPT

## 2019-01-28 NOTE — PROGRESS NOTES
Daily Note     Today's date: 2019  Patient name: Jose Guadalupe Meadows  : 1960  MRN: [id number]  Referring provider: Valencia Avila MD  Dx:   Encounter Diagnosis     ICD-10-CM    1  Complete tear of right rotator cuff M75 121               Subjective: Pt reports no change in c/o  "I feel like I'm stiffer than I should be "    Objective: See treatment diary below    Visit #11    [address] to start 5'   Skin intact pre/post    TherEx: 15'  Pulleys flexion 5'  Stand cane abd 5"x20  Stand cane ER @ neutral 5"x20  Seated flexion/abduction/ER table slides 10"x10 each  OH flex w/ cane 20x3''  AROM flex in sup 10x  AROM abd in sidelying 10x      Manual: 15'  Grade 3-4 GHJ mobs/distrcation,  PROM/manual stretching as per protocol     CP 10' to end   Skin intact pre/post    HEP: Cont flex/abd table slides, A-AROM flex, ER w/ cane neutral        Assessment: Tolerated treatment well  Discussed w/ patient that he is progressing appropriately according to his post-operative time frame and that rehab protocol for RTC repair takes time and patience  Stiffness and tightness persist limiting full PROM, but improving  Intro gravity minimized AROM today with fair tolerance  Patient exhibited good technique with therapeutic exercises and would benefit from continued PT  Plan: Continue per plan of care  Progress treament per protocol

## 2019-01-29 ENCOUNTER — OFFICE VISIT (OUTPATIENT)
Dept: PULMONOLOGY | Facility: MEDICAL CENTER | Age: 59
End: 2019-01-29
Payer: MEDICARE

## 2019-01-29 VITALS
OXYGEN SATURATION: 97 % | DIASTOLIC BLOOD PRESSURE: 84 MMHG | TEMPERATURE: 97.2 F | WEIGHT: 212 LBS | HEART RATE: 64 BPM | BODY MASS INDEX: 28.71 KG/M2 | HEIGHT: 72 IN | SYSTOLIC BLOOD PRESSURE: 122 MMHG | RESPIRATION RATE: 12 BRPM

## 2019-01-29 DIAGNOSIS — J45.30 MILD PERSISTENT ASTHMA WITHOUT COMPLICATION: Primary | ICD-10-CM

## 2019-01-29 DIAGNOSIS — G47.33 OSA (OBSTRUCTIVE SLEEP APNEA): ICD-10-CM

## 2019-01-29 PROCEDURE — 99213 OFFICE O/P EST LOW 20 MIN: CPT | Performed by: INTERNAL MEDICINE

## 2019-01-29 NOTE — PROGRESS NOTES
Assessment/Plan:     Problem List Items Addressed This Visit        Respiratory    Asthma - Primary     Patient has evidence of atopic asthma with markedly elevated IgE levels  He has a history multiple allergens and did undergo allergy treatments however he does not feel that this is helped him  We have discussed Xolair therapy and he is given information about this but would like to hold off on this therapy at this time  He just recently restarted his Breo and does feel better  He is aware that this therapy is available  TAJ (obstructive sleep apnea)     Patient was on therapy in the past however was unable to tolerate this and at this time does not wish to pursue any further therapy  Follow-up in 3-4 months  All questions are answered to the patient's satisfaction and understanding  He verbalizes understanding  He is encouraged to call with any further questions or concerns  Portions of the record may have been created with voice recognition software  Occasional wrong word or "sound a like" substitutions may have occurred due to the inherent limitations of voice recognition software  Read the chart carefully and recognize, using context, where substitutions have occurred  Electronically Signed by Mayda Fernandez MD    ______________________________________________________________________    Chief Complaint:   Chief Complaint   Patient presents with    Nasal Congestion      pt states that he has no cough       Patient ID: Cholo Meier is a 66 y o  y o  male has a past medical history of Asthma; Hyperlipidemia; Nasal polyps; Near syncope; and Sleep apnea, obstructive  1/29/2019  Patient presents today for follow-up visit  Feels like the [facility] is irritating to his throat now  He did not have this before when he was using the [facility]  Stopped using Dymista-twice a day and Breo for 6 weeks post surgery  He also takes Claritin and Singulair     Had allergen panel done at ENT- he did complete 2 year therapy- he did not see much of a difference  He tested positive for everything  He is not on prednisone now  Had to take antibiotics twice during the last 6 weeks  HPI    Review of Systems   Constitutional: Negative for fever  HENT: Positive for congestion, rhinorrhea, sinus pressure and sore throat  Negative for trouble swallowing  Respiratory:        See HPI   Cardiovascular: Negative for leg swelling  Gastrointestinal: Negative for abdominal distention  Musculoskeletal: Positive for arthralgias  All other systems reviewed and are negative  Smoking history: He reports that he quit smoking about 19 years ago  He has a 7 50 pack-year smoking history  He has never used smokeless tobacco         Immunization History   Administered Date(s) Administered    Influenza Quadrivalent Preservative Free Pediatric IM 10/01/2016    Influenza TIV (IM) 11/06/2017    Influenza, injectable, quadrivalent, preservative free 0 5 mL 11/05/2018     Current Outpatient Prescriptions   Medication Sig Dispense Refill    esomeprazole (NexIUM) 20 mg capsule 40 mg daily in the early morning    0    fluticasone-vilanterol (BREO ELLIPTA) 200-25 MCG/INH inhaler Inhale 1 puff daily Rinse mouth after use   3 Inhaler 3    mometasone (NASONEX) 50 mcg/act nasal spray instill 2 sprays into each nostril twice a day  1    montelukast (SINGULAIR) 10 mg tablet Take 1 tablet (10 mg total) by mouth daily at bedtime (Patient taking differently: Take 10 mg by mouth every morning  ) 90 tablet 3    Multiple Vitamin (MULTIVITAMIN) tablet Take 1 tablet by mouth daily      naproxen (EC NAPROSYN) 500 MG EC tablet Take 1 tablet (500 mg total) by mouth 2 (two) times a day with meals 60 tablet 0    rOPINIRole (REQUIP) 2 mg tablet Take 1 tablet (2 mg total) by mouth daily at bedtime 90 tablet 3    rosuvastatin (CRESTOR) 10 MG tablet Take 1 tablet (10 mg total) by mouth daily (Patient taking differently: Take 10 mg by mouth daily at bedtime  ) 30 tablet 3    valACYclovir (VALTREX) 500 mg tablet Take 500 mg by mouth 2 (two) times a day  0    VENTOLIN  (90 Base) MCG/ACT inhaler Inhale 2 puffs every 4 (four) hours as needed for wheezing or shortness of breath 3 Inhaler 3    amoxicillin-clavulanate (AUGMENTIN) 875-125 mg per tablet take 1 tablet by mouth twice a day for 14 days  0     No current facility-administered medications for this visit  Allergies: Patient has no known allergies  Objective:  Vitals:    01/29/19 1110   BP: 122/84   BP Location: Right arm   Patient Position: Sitting   Cuff Size: Standard   Pulse: 64   Resp: 12   Temp: (!) 97 2 °F (36 2 °C)   TempSrc: Oral   SpO2: 97%   Weight: 96 2 kg (212 lb)   Height: 6' (1 829 m)   Oxygen Therapy  SpO2: 97 %    Wt Readings from Last 3 Encounters:   01/29/19 96 2 kg (212 lb)   01/25/19 96 9 kg (213 lb 9 6 oz)   12/14/18 96 3 kg (212 lb 6 4 oz)     Body mass index is 28 75 kg/m²  Physical Exam   Constitutional: He is oriented to person, place, and time  He appears well-developed and well-nourished  No distress  HENT:   Head: Normocephalic and atraumatic  Mouth/Throat: Oropharynx is clear and moist  No oropharyngeal exudate  Eyes: Pupils are equal, round, and reactive to light  EOM are normal    Neck: Normal range of motion  Neck supple  Cardiovascular: Normal rate and regular rhythm  No murmur heard  Pulmonary/Chest: Effort normal and breath sounds normal  No respiratory distress  He has no wheezes  He has no rales  He exhibits no tenderness  Abdominal: Soft  Bowel sounds are normal  He exhibits no distension  There is no tenderness  Musculoskeletal: Normal range of motion  He exhibits no edema  Neurological: He is alert and oriented to person, place, and time  No cranial nerve deficit  Skin: Skin is warm and dry  He is not diaphoretic  Psychiatric: He has a normal mood and affect  His behavior is normal    Vitals reviewed

## 2019-01-30 NOTE — ASSESSMENT & PLAN NOTE
Patient has evidence of atopic asthma with markedly elevated IgE levels  He has a history multiple allergens and did undergo allergy treatments however he does not feel that this is helped him  We have discussed Xolair therapy and he is given information about this but would like to hold off on this therapy at this time  He just recently restarted his Breo and does feel better  He is aware that this therapy is available

## 2019-01-31 ENCOUNTER — OFFICE VISIT (OUTPATIENT)
Dept: PHYSICAL THERAPY | Facility: CLINIC | Age: 59
End: 2019-01-31
Payer: MEDICARE

## 2019-01-31 DIAGNOSIS — M75.121 COMPLETE TEAR OF RIGHT ROTATOR CUFF: Primary | ICD-10-CM

## 2019-01-31 PROCEDURE — 97110 THERAPEUTIC EXERCISES: CPT

## 2019-01-31 PROCEDURE — 97140 MANUAL THERAPY 1/> REGIONS: CPT

## 2019-01-31 NOTE — PROGRESS NOTES
Daily Note     Today's date: 2019  Patient name: Stacie Hughes  : 1960  MRN: [id number]  Referring provider: Renetta Taylor MD  Dx:   Encounter Diagnosis     ICD-10-CM    1  Complete tear of right rotator cuff M75 121               Subjective: Pt reports "shoulder feels the same "     Objective: See treatment diary below    Visit #12    MH to start 5'   Skin intact pre/post    Manual: 15'  Grade 3-4 GHJ mobs/distrcation  PROM/manual stretching as per protocol     TherEx: 30'  Pulleys flexion 5'  IR str w/ SOS 10"x10  Wall slides 10"x10  Stand cane abd 5"x20  Stand cane bilateral extension 5"x20  Seated abduction/ER table slides 10"x10 each  AROM flex in sup 10x  AROM abd in sidelying 10x    CP 10' to end   Skin intact pre/post    HEP: Cont flex/abd table slides, A-AROM flex, ER w/ cane neutral      Assessment: Tolerated treatment well  Cont discomfort t/o manual stretching and ROM exercises at end range  Demo difficulty w/ AROM in gravity min plane but tolerated  Patient exhibited good technique with therapeutic exercises and would benefit from continued PT  Plan: Continue per plan of care  Progress treament per protocol

## 2019-02-05 ENCOUNTER — OFFICE VISIT (OUTPATIENT)
Dept: PHYSICAL THERAPY | Facility: CLINIC | Age: 59
End: 2019-02-05
Payer: MEDICARE

## 2019-02-05 DIAGNOSIS — M75.121 COMPLETE TEAR OF RIGHT ROTATOR CUFF: Primary | ICD-10-CM

## 2019-02-05 PROCEDURE — 97110 THERAPEUTIC EXERCISES: CPT

## 2019-02-05 PROCEDURE — 97140 MANUAL THERAPY 1/> REGIONS: CPT

## 2019-02-05 NOTE — PROGRESS NOTES
Daily Note     Today's date: 2019  Patient name: Katerin Jackson  : 1960  MRN: [id number]  Referring provider: Jacob Dunn MD  Dx:   Encounter Diagnosis     ICD-10-CM    1  Complete tear of right rotator cuff M75 121               Subjective: Pt reports cont stiffness and mild pain  Objective: See treatment diary below    Visit #13    MH to start 5'   Skin intact pre/post    Manual: 15'  Grade 3-4 GHJ mobs/distrcation  PROM/manual stretching as per protocol     TherEx: 15'  Pulleys flexion 5'  Sup posterior capsule stretch 10"x10  IR str w/ SOS 10"x10  Wall slides 10"x10  Stand cane abd 5"x20  Stand cane bilateral extension 5"x20  Seated ER table slides 10"x10 each  AROM flex in sup 10x  AROM abd in sidelying 10x    CP 10' to end   Skin intact pre/post    HEP: Cont flex/abd table slides, A-AROM flex, ER w/ cane neutral      PROM: Shoulder flex: 140 deg    Shoulder abd: 125 deg    Assessment: Tolerated treatment well  Patient exhibited good technique with therapeutic exercises and would benefit from continued PT  Plan: Continue per plan of care  Progress treament per protocol

## 2019-02-08 ENCOUNTER — OFFICE VISIT (OUTPATIENT)
Dept: PHYSICAL THERAPY | Facility: CLINIC | Age: 59
End: 2019-02-08
Payer: MEDICARE

## 2019-02-08 DIAGNOSIS — M75.121 COMPLETE TEAR OF RIGHT ROTATOR CUFF: Primary | ICD-10-CM

## 2019-02-08 PROCEDURE — 97110 THERAPEUTIC EXERCISES: CPT

## 2019-02-08 PROCEDURE — 97140 MANUAL THERAPY 1/> REGIONS: CPT

## 2019-02-08 NOTE — PROGRESS NOTES
Daily Note     Today's date: 2019  Patient name: Yessica Beebe  : 1960  MRN: [id number]  Referring provider: Denise Oneal MD  Dx:   Encounter Diagnosis     ICD-10-CM    1  Complete tear of right rotator cuff M75 121               Subjective: Pt reports he is starting to feel a little better  Felt sore after last session but it went away  Objective: See treatment diary below    Visit #14    MH to start 5'   Skin intact pre/post    Manual: 15'  Grade 3-4 GHJ mobs/distrcation  PROM/manual stretching as per protocol     TherEx: 45'  Pulleys flexion 5'  Sup posterior capsule stretch 10"x10  Sup ER 90/90 w/ cane 20x5"  AROM flex in sup 10x  AROM abd in sidelying 10x  IR str w/ SOS 10"x10  Wall slides 10"x10  Stand cane abd 5"x20  Stand cane bilateral extension 5"x20  Seated ER table slides 10"x10 each    CP 10' to end   Skin intact pre/post    HEP: Cont flex/abd table slides, A-AROM flex, ER w/ cane neutral        Assessment: Tolerated treatment well  Patient exhibited good technique with therapeutic exercises and would benefit from continued PT  Plan: Continue per plan of care  Progress treament per protocol

## 2019-02-13 ENCOUNTER — EVALUATION (OUTPATIENT)
Dept: PHYSICAL THERAPY | Facility: CLINIC | Age: 59
End: 2019-02-13
Payer: MEDICARE

## 2019-02-13 DIAGNOSIS — M75.121 COMPLETE TEAR OF RIGHT ROTATOR CUFF: Primary | ICD-10-CM

## 2019-02-13 PROCEDURE — 97140 MANUAL THERAPY 1/> REGIONS: CPT

## 2019-02-13 PROCEDURE — 97110 THERAPEUTIC EXERCISES: CPT

## 2019-02-13 NOTE — PROGRESS NOTES
PT Re-Evaluation     Today's date: 2019  Patient name: Matt Lu  : 1960  MRN: [id number]  Referring provider: Josefa Ling MD  Dx:   Encounter Diagnosis     ICD-10-CM    1  Complete tear of right rotator cuff M75 121                   Assessment  Assessment details: Matt Lu is a 66 y o  male who has been participating in skilled PT since [facility] s/p R RTC repair  Pt has demo slow but steady progress towards est LTG's but cont to present with pain, decreased strength, decreased ROM and decreased joint mobility consistent w/ post op protocol  Due to these impairments, patient has difficulty performing ADL's, recreational activities, work-related activities, lifting/carrying, reaching  Patient's clinical presentation is consistent with their referring diagnosis of Complete tear of right rotator cuff, Comment: Supraspinatus tendon  At this time, pt demo cont capsular restrictions leading to slow A/PROM improvements which is limiting ability to progress to next stage in protocol  Pt demo (+) scapular hiking when attempting active shoulder elevation  Patient has been educated in post-op contraindications / precautions, home exercise program and plan of care  Patient would benefit from skilled physical therapy services to address their aforementioned functional limitations and progress towards prior level of function and independence with home exercise program      Impairments: abnormal muscle firing, abnormal or restricted ROM, activity intolerance, impaired physical strength, lacks appropriate home exercise program and pain with function  Understanding of Dx/Px/POC: good   Prognosis: good    Goals  Short Term Goals: Target Date 3/13/19 (revised)  1  Initiate and advance HEP (met)  2  Restore full right shoulder PROM (ongoing/not met)  3  Decrease c/o pain to 3/10 at worst (not met)    Long Term Goals: Target Date 19  (revised)  1  Indep with HEP (ongoing)  2   Full pain free A/PROM (ongoing)  3  Increase right shoulder strength to 4/5 at least so that pt can lift 10lbs overhead without pain or substitution (not met)  4  Return to previous level of activity (not met)    Plan  Patient would benefit from: skilled PT  Planned modality interventions: cryotherapy, electrical stimulation/Russian stimulation and thermotherapy: hydrocollator packs  Planned therapy interventions: joint mobilization, manual therapy, patient education, postural training, activity modification, abdominal trunk stabilization, body mechanics training, flexibility, functional ROM exercises, graded exercise, home exercise program, neuromuscular re-education, strengthening, stretching, therapeutic activities, therapeutic exercise, motor coordination training, muscle pump exercises, gait training, balance/weight bearing training and ADL training  Frequency: 3x week  Plan of Care beginning date: 2018  Plan of Care expiration date: 2019  Treatment plan discussed with: patient        Subjective Evaluation    History of Present Illness  Date of surgery: 2018  Mechanism of injury: Pt has completed approx 2 months of PT at this time  Reports 70% improvement with regards to functional level since [facility] but remains below PLOF due to surgical precautions and post op protocol  Pt reports moderate difficulty w/ use of R UE and cannot perform activities above shoulder height  Concerned about tightness in shoulder and slow progression  Sleeping is less disrupted, cannot lie on R side  Follow up w/ ortho 3/11/19  Cont to modify ADL's and substitute with L UE when needed             Not a recurrent problem   Quality of life: good    Pain  Current pain rating: 3  At best pain ratin  At worst pain ratin  Quality: dull ache, tight and discomfort  Relieving factors: heat  Aggravating factors: overhead activity and lifting  Progression: improved    Hand dominance: right    Patient Goals  Patient goals for therapy: increased strength and decreased pain  Patient goal: return to everything before incident        Objective    Shoulder PROM Left   Right  Flexion   WNL  160  Abduction  WNL  150  Int Rot   WNL  45 in scapular plane  Ext Rot  WNL  60 in scapular plane    End range pain and firm end feel w/ all motions    Joint mobility: Hypomobility noted GHJ w/ posterior and inferior most restricted  Shoulder AROM: Left  Right  Flexion  sup/stand 170  140/130  Abduction sup/stand 170  134/130     Int Rot (Combined) T8  L5*  Ext Rot (Combined) C7  Occiput*     (+) Scapular hiking noted w/ all shoulder elevation     Strength not assessed due to lack of AROM  Precautions: Post-op protocol  Daily Treatment Diary     Visit #14    MH to start 5'   Skin intact pre/post    Manual: 15'  Grade 3-4 GHJ mobs/distrcation  PROM/manual stretching as per protocol     TherEx: 45'  Pulleys flexion 5'  AROM flex in sup 10x  AROM abd in sidelying 10x  Bicep curl [place] 8# 2x10  Tricep ext  Rachel 8# 2x10  Prone scap set w/ ext  Prone scap set row   Stand posterior capsule stretch 10"x5  Sleeper str 20"x3  Hand behind str 5"x10  IR str w/ SOS 10"x10  Wall slides 10"x10  Stand cane abd 5"x20      CP 10' to end   Skin intact pre/post    HEP: Added posterior capsule str, prone scap set w/ ext, wall slides, sleeper str

## 2019-02-15 ENCOUNTER — OFFICE VISIT (OUTPATIENT)
Dept: PHYSICAL THERAPY | Facility: CLINIC | Age: 59
End: 2019-02-15
Payer: MEDICARE

## 2019-02-15 DIAGNOSIS — M75.121 COMPLETE TEAR OF RIGHT ROTATOR CUFF: Primary | ICD-10-CM

## 2019-02-15 PROCEDURE — 97110 THERAPEUTIC EXERCISES: CPT

## 2019-02-15 PROCEDURE — 97140 MANUAL THERAPY 1/> REGIONS: CPT

## 2019-02-15 NOTE — PROGRESS NOTES
Daily Note     Today's date: 2/15/2019  Patient name: Shira Suazo  : 1960  MRN: [id number]  Referring provider: Rebekah Owens MD  Dx:   Encounter Diagnosis     ICD-10-CM    1  Complete tear of right rotator cuff M75 121               Subjective: Pt reports he is starting to feel a little better  Felt sore after last session but it went away  Objective: See treatment diary below    Visit 16    Manual: 15'  Grade 3-4 GHJ mobs/distrcation  PROM/manual stretching as per protocol     TherEx: 45'  Pulleys flexion 5'  AROM flex in sup 10x  AROM abd in sidelying 10x  Bicep curl [place] 8# 2x10  Tricep ext  [place] 8# 2x10  IR str w/ SOS 10"x10  Stand cane ext 5"x20  [place] Scap rows #15 2x10  Stand cane abd 5"x20  Stand posterior capsule stretch 10"x10  Wall slides 10"x10      MH 10' to end   Skin intact pre/post    HEP: Cont posterior capsule str, prone scap set w/ ext, wall slides, sleeper str  Assessment: Tolerated treatment well  Good kumar to recent progression  Cont to lack full ROM but improving  Hypmobility noted posterior capsule most significantly  Patient exhibited good technique with therapeutic exercises and would benefit from continued PT  Plan: Continue per plan of care  Progress treament per protocol

## 2019-02-19 ENCOUNTER — OFFICE VISIT (OUTPATIENT)
Dept: PHYSICAL THERAPY | Facility: CLINIC | Age: 59
End: 2019-02-19
Payer: MEDICARE

## 2019-02-19 DIAGNOSIS — M75.121 COMPLETE TEAR OF RIGHT ROTATOR CUFF: Primary | ICD-10-CM

## 2019-02-19 PROCEDURE — 97140 MANUAL THERAPY 1/> REGIONS: CPT

## 2019-02-19 PROCEDURE — 97110 THERAPEUTIC EXERCISES: CPT

## 2019-02-19 NOTE — PROGRESS NOTES
Daily Note     Today's date: 2019  Patient name: Katerin Jackson  : 1960  MRN: [id number]  Referring provider: Jacob Dunn MD  Dx:   Encounter Diagnosis     ICD-10-CM    1  Complete tear of right rotator cuff M75 121               Subjective: Pt reports he is feeling a little better overall, "I feel like my ROM is better "      Objective: See treatment diary below    Visit 17    Manual: 15'  Grade 3-4 GHJ mobs/distrcation  PROM/manual stretching as per protocol     TherEx: 15'  Pulleys flexion 5'  Stand posterior capsule stretch 10"x10  Bicep curl [place] 8# 2x10  Tricep ext  [place] 8# 2x10  IR str w/ SOS 10"x10  [place] Scap rows #15 2x10  Scap retra w/ RTB 2x10    AROM flex in standing 10x2  AROM abd in standing 10x2  Stand cane abd 5"x20  Wall slides 10"x10    MH 10' to end   Skin intact pre/post    HEP: Cont posterior capsule str, prone scap set w/ ext, wall slides, sleeper str  Assessment: Tolerated treatment well  Good scapular control with active shoulder elevation without hiking noted today  Patient exhibited good technique with therapeutic exercises and would benefit from continued PT  Plan: Continue per plan of care  Progress treament per protocol

## 2019-02-21 ENCOUNTER — OFFICE VISIT (OUTPATIENT)
Dept: PHYSICAL THERAPY | Facility: CLINIC | Age: 59
End: 2019-02-21
Payer: MEDICARE

## 2019-02-21 DIAGNOSIS — M75.121 COMPLETE TEAR OF RIGHT ROTATOR CUFF: Primary | ICD-10-CM

## 2019-02-21 PROCEDURE — 97110 THERAPEUTIC EXERCISES: CPT | Performed by: PHYSICAL THERAPIST

## 2019-02-21 PROCEDURE — 97140 MANUAL THERAPY 1/> REGIONS: CPT | Performed by: PHYSICAL THERAPIST

## 2019-02-21 NOTE — PROGRESS NOTES
Daily Note     Today's date: 2019  Patient name: Tarry Hashimoto  : 1960  MRN: [id number]  Referring provider: Mendez Deng MD  Dx:   Encounter Diagnosis     ICD-10-CM    1  Complete tear of right rotator cuff M75 121                   Subjective: Pt reports he's a little more sore today than usual due to pushing slush off the driveway yesterday ()  Objective: See treatment diary below; pt 20' late  Reviewed importance of stretching flex/abd/IR and ER every day due to full mobility not yet achieved  DOS:  (11 weeks post op)  Visit 18    Manual: 10''  Grade 3-4 GHJ mobs/distrcation  PROM/manual stretching as per protocol     TherEx: 30'  Pulleys flexion 5'  Stand posterior capsule stretch 10"x10  Bicep curl [place] 10# 2x10  Tricep ext  [place] 8# not performed  IR str w/ SOS 10"x10  [place] Scap rows #15 not performed  Supine Cane ER 90/90 10 x 10"  TB B/L ER yellow 2x15  AROM scaption in standing 1# 2 x 10  Stand cane abd 5"x20  Wall slides 10"x10  Prone B/L exten 2# 2 x 10    MH 10' to end - not performed (pt late)  Skin intact pre/post    HEP: Cont posterior capsule str, prone scap set w/ ext, wall slides, sleeper str  Assessment: Tolerated treatment well and demonstrates capsular pattern not unexptected w/ post RC repair    Patient demonstrated fatigue post treatment, would benefit from continued PT and continued HEP compliance to improve A/PROM which remains limited, but improving  Plan: Progress treament per protocol

## 2019-02-26 ENCOUNTER — OFFICE VISIT (OUTPATIENT)
Dept: PHYSICAL THERAPY | Facility: CLINIC | Age: 59
End: 2019-02-26
Payer: MEDICARE

## 2019-02-26 DIAGNOSIS — M75.121 COMPLETE TEAR OF RIGHT ROTATOR CUFF: Primary | ICD-10-CM

## 2019-02-26 PROCEDURE — 97112 NEUROMUSCULAR REEDUCATION: CPT

## 2019-02-26 PROCEDURE — 97140 MANUAL THERAPY 1/> REGIONS: CPT

## 2019-02-26 NOTE — PROGRESS NOTES
Daily Note     Today's date: 2019  Patient name: Jessica Decker  : 1960  MRN: [id number]  Referring provider: Ranjit Marin MD  Dx:   Encounter Diagnosis     ICD-10-CM    1  Complete tear of right rotator cuff M75 121                   Subjective: Pt reports no c/o  C/C is right shoulder moves all together when he reaches overhead      Objective: See treatment diary below  Reviewed importance of stretching flex/abd/IR and ER every day due to full mobility not yet achieved  DOS:  (11 weeks post op)  Visit 19    Manual: 10''  Grade 3-4 GHJ mobs/distrcation  PROM/manual stretching as per protocol   I    TherEx: 10'  Pulleys flexion 5'  Stand posterior capsule stretch 10"x10  IR str w/ SOS 10"x10  TB B/L ER yellow 2x15    Neuro re-ed:15 min  Rows 8lbs 2 x 12  IR at 60 deg elevation yellow 2 x 10  IR/ER with manual resist  Rhythmic stab in flex 30sec x 3    HEP: Cont posterior capsule str, prone scap set w/ ext, wall slides, sleeper str  Assessment: Tolerated treatment well  Patient demonstrated fatigue post treatment, would benefit from continued PT and continued HEP compliance to improve A/PROM which remains limited, but improving  Reviewed Doorway stretches for flexion/ER and IR self stretch      Plan: Progress treament per protocol

## 2019-02-28 ENCOUNTER — OFFICE VISIT (OUTPATIENT)
Dept: PHYSICAL THERAPY | Facility: CLINIC | Age: 59
End: 2019-02-28
Payer: MEDICARE

## 2019-02-28 DIAGNOSIS — M75.121 COMPLETE TEAR OF RIGHT ROTATOR CUFF: Primary | ICD-10-CM

## 2019-02-28 PROCEDURE — 97140 MANUAL THERAPY 1/> REGIONS: CPT | Performed by: PHYSICAL THERAPIST

## 2019-02-28 PROCEDURE — 97112 NEUROMUSCULAR REEDUCATION: CPT | Performed by: PHYSICAL THERAPIST

## 2019-02-28 PROCEDURE — 97110 THERAPEUTIC EXERCISES: CPT | Performed by: PHYSICAL THERAPIST

## 2019-02-28 NOTE — PROGRESS NOTES
Daily Note     Today's date: 2019  Patient name: Yessica Beebe  : 1960  MRN: [id number]  Referring provider: Denise Oneal MD  Dx:   Encounter Diagnosis     ICD-10-CM    1  Complete tear of right rotator cuff M75 121                   Subjective: Pt states his shoulder is still stiff  He admits to less than advised HEP compliance  Objective: See treatment diary below; reminded pt of importance of stretching flex/abd/ER/IR daily  DOS:  (19= 12 weeks post op)  FOTO: 19      Manual: 10''  Grade 3-4 GHJ mobs/distraction  PROM/manual stretching as per protocol   I    TherEx: 20'  Pulleys flexion 5'  Stand posterior capsule stretch 10"x10  IR str w/ SOS 10"x10  TB B/L ER red 2x15  Adduction w/ tubing blue 2x10  B/L scaption 2# 2x10  SL abd 2# 2x10    Neuro re-ed:15 min  Rows 10lbs 2 x 15  Rhythmic stab in flex 30sec x 3  Prone HAbd 2x10    Assessment: Tolerated treatment well and continues w/ ROM limitations all planes with some improvement  Patient demonstrated fatigue post treatment and would benefit from continued PT      Plan: Progress treament per protocol

## 2019-03-05 ENCOUNTER — OFFICE VISIT (OUTPATIENT)
Dept: PHYSICAL THERAPY | Facility: CLINIC | Age: 59
End: 2019-03-05
Payer: MEDICARE

## 2019-03-05 DIAGNOSIS — M75.121 COMPLETE TEAR OF RIGHT ROTATOR CUFF: Primary | ICD-10-CM

## 2019-03-05 PROCEDURE — 97140 MANUAL THERAPY 1/> REGIONS: CPT | Performed by: PHYSICAL THERAPIST

## 2019-03-05 PROCEDURE — 97110 THERAPEUTIC EXERCISES: CPT | Performed by: PHYSICAL THERAPIST

## 2019-03-05 PROCEDURE — 97112 NEUROMUSCULAR REEDUCATION: CPT | Performed by: PHYSICAL THERAPIST

## 2019-03-05 NOTE — PROGRESS NOTES
Daily Note     Today's date: 3/5/2019  Patient name: Cristian Ulloa  : 1960  MRN: [id number]  Referring provider: Jany Ku MD  Dx:   Encounter Diagnosis     ICD-10-CM    1  Complete tear of right rotator cuff M75 121                   Subjective: Pt reports his shoulder pain is improved (3/10 @ worst after "pushing snow")      Precautions: protocol    Objective: See treatment diary below; reminded pt of importance of stretching flex/abd/ER/IR daily  DOS:  (19= 12 weeks post op)  FOTO: 19      Manual: 10''  Grade 3-4 GHJ mobs/distraction  PROM/manual stretching as per protocol   I    TherEx: 30'  Pulleys flexion 5'  Stand posterior capsule stretch 10"x5  IR str w/ SOS 10"x10  TB B/L ER grn 2x15  Adduction w/ tubing blue 2x10  B/L scaption 2# 2x10  SL abd 2# 10"x5  Supine flexion 2# 10"x5  Elbow plank ups 2x10  Uni chest press 10# moraima 2x10      Neuro re-ed:15 min  Uni Rows moraima 10lbs 2 x 15  Tubing HAbd yellow 2x10  Prone "W" 2x10    Assessment: Tolerated treatment well and is demonstrating improved ROM  Alison Mckee Patient demonstrated fatigue post treatment and would benefit from continued PT      Plan: Progress treatment as tolerated

## 2019-03-07 ENCOUNTER — OFFICE VISIT (OUTPATIENT)
Dept: PHYSICAL THERAPY | Facility: CLINIC | Age: 59
End: 2019-03-07
Payer: MEDICARE

## 2019-03-07 DIAGNOSIS — M75.121 COMPLETE TEAR OF RIGHT ROTATOR CUFF: Primary | ICD-10-CM

## 2019-03-07 PROCEDURE — 97140 MANUAL THERAPY 1/> REGIONS: CPT | Performed by: PHYSICAL THERAPIST

## 2019-03-07 PROCEDURE — 97110 THERAPEUTIC EXERCISES: CPT | Performed by: PHYSICAL THERAPIST

## 2019-03-07 NOTE — PROGRESS NOTES
Daily Note     Today's date: 3/7/2019  Patient name: Cristian Ulloa  : 1960  MRN: [id number]  Referring provider: Jany Ku MD  Dx:   Encounter Diagnosis     ICD-10-CM    1  Complete tear of right rotator cuff M75 121                   Subjective: Pt reports R shoulder pain 3/10 at worst w/ end ROM      Objective: See treatment diary below  DOS:  (19= 12 weeks post op)  FOTO: 19/ RE 19  Visit # 18      Manual: 10'  Grade 3-4 GHJ mobs/distraction  PROM/manual stretching as per protocol       TherEx: 20'  Pulleys flexion 5'  OH PBall wall throws 2x15  Table push ups 2x10  Sleeper stretch 20"x3  IR str w/ SOS 10"x10  TB B/L ER grn 2x15  Adduction w/ tubing blue NP  B/L scaption 2# 2x10  SL abd 2# 10"x5  Supine flexion 2# 10"x5  Elbow plank ups NP  Uni chest press 10# moraima 2x10      Neuro re-ed:  Lat pull downs moraima 10lbs 2x10  Tubing HAbd yellow 2x10  Prone "W" 2x10        Assessment: Tolerated treatment well  Patient demonstrated fatigue post treatment, would benefit from continued PT and joint ROM is slowly improving  Pt has tolerated advancement of strengthening and weight bearing through his arms well  Plan: Progress treatment as tolerated

## 2019-03-11 ENCOUNTER — OFFICE VISIT (OUTPATIENT)
Dept: OBGYN CLINIC | Facility: CLINIC | Age: 59
End: 2019-03-11
Payer: MEDICARE

## 2019-03-11 VITALS
BODY MASS INDEX: 29.28 KG/M2 | WEIGHT: 216.2 LBS | SYSTOLIC BLOOD PRESSURE: 132 MMHG | HEIGHT: 72 IN | DIASTOLIC BLOOD PRESSURE: 82 MMHG | HEART RATE: 61 BPM

## 2019-03-11 DIAGNOSIS — Z98.890 STATUS POST RIGHT ROTATOR CUFF REPAIR: Primary | ICD-10-CM

## 2019-03-11 PROCEDURE — 99213 OFFICE O/P EST LOW 20 MIN: CPT | Performed by: ORTHOPAEDIC SURGERY

## 2019-03-12 ENCOUNTER — EVALUATION (OUTPATIENT)
Dept: PHYSICAL THERAPY | Facility: CLINIC | Age: 59
End: 2019-03-12
Payer: MEDICARE

## 2019-03-12 DIAGNOSIS — M75.121 COMPLETE TEAR OF RIGHT ROTATOR CUFF: Primary | ICD-10-CM

## 2019-03-12 PROCEDURE — 97112 NEUROMUSCULAR REEDUCATION: CPT | Performed by: PHYSICAL THERAPIST

## 2019-03-12 PROCEDURE — 97140 MANUAL THERAPY 1/> REGIONS: CPT | Performed by: PHYSICAL THERAPIST

## 2019-03-12 NOTE — PROGRESS NOTES
PT Re-Evaluation     Today's date: 3/12/2019  Patient name: Main Spivey  : 1960  MRN: [id number]  Referring provider: Deacon Dial MD  Dx:   Encounter Diagnosis     ICD-10-CM    1  Complete tear of right rotator cuff M75 121                   Assessment  Assessment details: Main Spivey is a 66 y o  male who has been participating in skilled PT since [facility] 2018 s/p R RTC repair  Pt continues to demo slow but steady progress towards est LTG's  He does cont with ERP and limited strength and ROM  Due to these impairments, patient has difficulty performing recreational activities, work-related activities, lifting/carrying, reaching  Patient's clinical presentation is consistent with his referring diagnosis of Complete tear of right rotator cuff, Comment: Supraspinatus tendon  At this time, pt demo continued but improving capsular restrictions leading to slow A/PROM improvements which is limiting functional abilities  He is often late to appointments and is partially compliant (at best) w/ HEP which hinders his progress  Scapular hiking with active shoulder elevation is reduced - occurring at end range  Patient has been educated in post-op contraindications / precautions, home exercise program and plan of care  Patient would benefit from continued skilled physical therapy services as per previous POC to address his aforementioned functional limitations and progress towards prior level of function and independence with home exercise program      Impairments: abnormal muscle firing, abnormal or restricted ROM, activity intolerance, impaired physical strength, lacks appropriate home exercise program and pain with function  Understanding of Dx/Px/POC: good   Prognosis: good    Goals  Short Term Goals: Target Date 3/13/19 (revised)  1  Initiate and advance HEP (met)  2  Restore full right shoulder PROM (ongoing/not met)  3   Decrease c/o pain to 3/10 at worst (not met)    Long Term Goals: Target Date 19  (revised)  1  Indep with HEP (ongoing)  2  Full pain free A/PROM (ongoing)  3  Increase right shoulder strength to 4/5 at least so that pt can lift 10lbs overhead without pain or substitution (not met)  4  Return to previous level of activity (not met)    Plan  Patient would benefit from: skilled PT  Planned modality interventions: cryotherapy, electrical stimulation/Russian stimulation and thermotherapy: hydrocollator packs  Planned therapy interventions: joint mobilization, manual therapy, patient education, functional ROM exercises, home exercise program, strengthening, stretching, therapeutic activities and therapeutic exercise  Frequency: 3x week  Plan of Care beginning date: 2018  Plan of Care expiration date: 2019  Treatment plan discussed with: patient        Subjective Evaluation    History of Present Illness  Date of surgery: 2018  Mechanism of injury: Pt has completed approx 3 months of PT at this time  Reports 75-80% improvement with regards to functional level since [facility] but remains below PLOF  due to surgical precautions and post op protocol  Pt can now perform activities above shoulder height with some difficulty/weakness  Sleeping is much improved  Pt has had increased pain after higher intensity functional activities ie: trying to push snow w/ shovel, OH lifting/reaching  He does not have full AROM/strength but is less than compliant w/ HEP            Not a recurrent problem   Quality of life: good    Pain  Current pain ratin  At best pain ratin  At worst pain ratin  Location: [facility] joint  Quality: dull ache, tight and discomfort  Relieving factors: heat  Aggravating factors: overhead activity and lifting  Progression: improved    Hand dominance: right    Patient Goals  Patient goals for therapy: increased strength and decreased pain  Patient goal: return to everything before incident        Objective    Shoulder PROM Right  Left Right     3/12/19 2/13/19 2/13/19  Flexion   160  WNL  160  Abduction  160  WNL  150  Int Rot   [email or web address]  WNL  45 in scapular plane  Ext Rot   [email or web address]  WNL  60 in scapular plane    End range pain and firm end feel w/ all motions    Joint mobility: Hypomobility noted GHJ w/ posterior and inferior most restricted  Shoulder AROM: Right  Left  Right     3/12/19 2/13/19 2/13/19  Flexion  stand  150  170  130  Abduction stand 135  170  130     Int Rot (Combined) L2  T8  L5*  Ext Rot (Combined) Occiput C7  Occiput*     (+) mild Scapular hiking noted w/ all shoulder elevation at end range    Strength: Right  Left    3/12/19 3/12/19  Flexion  3+/5  5/5  Abduction 3+/5  5/5  Int rot  4+/5  5/5  Ext rot  4/5  5/5      Precautions: Post-op protocol      Daily Treatment Diary     DOS: 12/9/6/18 (2/28/19= 12 weeks post op)  FOTO/RE: 3/12/19  Visit # 20      Manual: 10'  Grade 3-4 GHJ mobs/distraction  PROM/manual stretching as per protocol       TherEx: 5'  Pulleys flexion 5'  OH PBall wall throws 3x30  Table push ups 2x10  Sleeper stretch not perf  IR str w/ SOS 10"x10  TB B/L ER grn not perf  B/L scaption 2# not perf  SL abd 2# 10"x5  Supine flexion 2# 10"x5  Elbow plank ups NP  Uni IR @90 moraima 5# 2x10  B/L flexion @ moraima 5# 2x10      Neuro re-ed: 15'  Lat pull downs moraima 11lbs 2x10  Prone "T" 2x10  Prone "W" 2x10  D2 @ moraima 2 5# 2x10

## 2019-03-14 ENCOUNTER — OFFICE VISIT (OUTPATIENT)
Dept: PHYSICAL THERAPY | Facility: CLINIC | Age: 59
End: 2019-03-14
Payer: MEDICARE

## 2019-03-14 DIAGNOSIS — M75.101 TEAR OF RIGHT ROTATOR CUFF, UNSPECIFIED TEAR EXTENT: ICD-10-CM

## 2019-03-14 PROCEDURE — 97110 THERAPEUTIC EXERCISES: CPT

## 2019-03-14 NOTE — PROGRESS NOTES
Daily Note     Today's date: 3/14/2019  Patient name: Jackie Uribe  : 1960  MRN: [id number]  Referring provider: Marie Echeverria MD  Dx: No diagnosis found  Start Time: 1110  Stop Time: 1200  Total time in clinic (min): 50 minutes    Subjective: Reports no pain but his shoulder feels tight  Objective: See treatment diary below      Precautions: Post-op protocol  Daily Treatment Diary     DOS:  (19= 12 weeks post op)  FOTO/RE: 3/12/19  Visit # 21    Manual: 10'  Grade 3-4 GHJ mobs/distraction  PROM/manual stretching as per protocol       TherEx: 5'  Pulleys flexion 5'  OH PBall wall throws 3x30  Table push ups 2x10  Sleeper stretch not perf  IR str w/ SOS 10"x10  TB B/L ER grn  B/L scaption 2# not perf  SL abd 2# 10"x5 NT  Supine flexion 2# 10"x5  Elbow plank ups NP  Uni IR @90 moraima 5# 2x10  B/L flexion @ moraima 5# 2x10      Neuro re-ed: 15'  Lat pull downs moraima 11lbs 2x10  Prone "T" 2x10  Prone "W" 2x10  D2 @ moraima 2 5# 2x10 NT  Assessment: Tolerated treatment well  He tolerated exercises well with no increase in pain  Moderate capsular restrictions limiting ROM address with manual tx  Patient would benefit from continued PT in order to return to PLOF and complete overhead activities pain free  Plan: Continue per plan of care

## 2019-03-19 ENCOUNTER — OFFICE VISIT (OUTPATIENT)
Dept: PHYSICAL THERAPY | Facility: CLINIC | Age: 59
End: 2019-03-19
Payer: MEDICARE

## 2019-03-19 DIAGNOSIS — M75.101 TEAR OF RIGHT ROTATOR CUFF, UNSPECIFIED TEAR EXTENT: Primary | ICD-10-CM

## 2019-03-19 DIAGNOSIS — M75.121 COMPLETE TEAR OF RIGHT ROTATOR CUFF: ICD-10-CM

## 2019-03-19 PROCEDURE — 97110 THERAPEUTIC EXERCISES: CPT | Performed by: PHYSICAL THERAPIST

## 2019-03-19 PROCEDURE — 97140 MANUAL THERAPY 1/> REGIONS: CPT | Performed by: PHYSICAL THERAPIST

## 2019-03-19 PROCEDURE — 97112 NEUROMUSCULAR REEDUCATION: CPT | Performed by: PHYSICAL THERAPIST

## 2019-03-19 NOTE — PROGRESS NOTES
Daily Note     Today's date: 3/19/2019  Patient name: Katerin Jackson  : 1960  MRN: [id number]  Referring provider: Jacob Dunn MD  Dx:   Encounter Diagnosis     ICD-10-CM    1  Tear of right rotator cuff, unspecified tear extent M75 101    2  Complete tear of right rotator cuff M75 121                   Subjective: Pt's c/c at this time is weakness w/ OH lifting      Objective: See treatment diary below; pt 20' late today - it was brought to pt's attention he is often late, and therapist cannot always accommodate this due to pt's scheduled after him  This makes it difficult to provide thorough treatment  Precautions: Post-op protocol  Daily Treatment Diary     DOS:  (19= 12 weeks post op)  FOTO/RE: 3/12/19  Visit # 22    Manual: 10'  Grade 3-4 GHJ mobs/distraction  PROM/manual stretching as per protocol       TherEx: 20'  Pulleys flexion 5'  OH PBall wall throws 3x20  Table push ups 3x10  Chair squats w/ OH press 5# R/L 2x15  IR str w/ SOS 10x10"  TB B/L ER grn not perf  SL abd 2# 10"x5 NT  Supine flexion 2# 10"x5  Elbow plank ups NP  Uni IR @90 moraima 5# 3x10  B/L flexion @ moraima 5# not perf      Neuro re-ed: 15'  Lat pull downs moraima 12lbs 2x10  Prone "T" 2x10  Prone "W" 2x10  D2 @ moraima 3# 3x10      Assessment: Tolerated treatment well  Patient demonstrated fatigue post treatment and is benefitting from program progression  Habitual lateness at times limits therapist's ability to assess/progress pt w/ thorough program      Plan: Progress treatment as tolerated

## 2019-03-21 ENCOUNTER — OFFICE VISIT (OUTPATIENT)
Dept: PHYSICAL THERAPY | Facility: CLINIC | Age: 59
End: 2019-03-21
Payer: MEDICARE

## 2019-03-21 DIAGNOSIS — M75.101 TEAR OF RIGHT ROTATOR CUFF, UNSPECIFIED TEAR EXTENT: Primary | ICD-10-CM

## 2019-03-21 DIAGNOSIS — M75.121 COMPLETE TEAR OF RIGHT ROTATOR CUFF: ICD-10-CM

## 2019-03-21 PROCEDURE — 97140 MANUAL THERAPY 1/> REGIONS: CPT | Performed by: PHYSICAL THERAPIST

## 2019-03-21 PROCEDURE — 97112 NEUROMUSCULAR REEDUCATION: CPT | Performed by: PHYSICAL THERAPIST

## 2019-03-21 PROCEDURE — 97110 THERAPEUTIC EXERCISES: CPT | Performed by: PHYSICAL THERAPIST

## 2019-03-21 NOTE — PROGRESS NOTES
Daily Note     Today's date: 3/21/2019  Patient name: Letha Cortez  : 1960  MRN: [id number]  Referring provider: Carla Herman MD  Dx:   Encounter Diagnosis     ICD-10-CM    1  Tear of right rotator cuff, unspecified tear extent M75 101    2  Complete tear of right rotator cuff M75 121                   Subjective: Pt reports being a little sore after last visit, "but that's good, I need it"  Objective: See treatment diary below  Precautions: Post-op protocol  Daily Treatment Diary     DOS:  (19= 12 weeks post op)  FOTO/RE: 3/12/19  Visit # 23    Manual: 10'  Grade 3-4 GHJ mobs/distraction  PROM/manual stretching as per protocol       TherEx: 20'  Pulleys flexion 3'  OH PBall wall throws 3x20  Table push ups 3x10  Chair squats w/ OH press 5# R/L 2x15  IR str w/ SOS 10x10"  TB B/L ER blue 2x12  SL abd 2# 10"x5 NP  Supine flexion 2# NP  Elbow plank serratus push-ups 2x10  Uni IR @90 moraima 6# 3x10  B/L flexion @ moraima 7 5# 2x12      Neuro re-ed: 20'  Lat pull downs moraima 13lbs 2x12  Decline row [place] 17# 2x12  Prone "T" NP  Prone "W" 1# 2x10  Prone [place]-W 1# 2x10  D2 @ moraima 3# 3x10  Opp diagonal HAbd blue 3x5 each          Assessment: Tolerated treatment well  Patient demonstrated fatigue post treatment, would benefit from continued PT and emphasis on OH strengthening and continued stretching (manual)      Plan: Progress treatment as tolerated

## 2019-03-26 ENCOUNTER — OFFICE VISIT (OUTPATIENT)
Dept: PHYSICAL THERAPY | Facility: CLINIC | Age: 59
End: 2019-03-26
Payer: MEDICARE

## 2019-03-26 DIAGNOSIS — M75.121 COMPLETE TEAR OF RIGHT ROTATOR CUFF: ICD-10-CM

## 2019-03-26 DIAGNOSIS — M75.101 TEAR OF RIGHT ROTATOR CUFF, UNSPECIFIED TEAR EXTENT: Primary | ICD-10-CM

## 2019-03-26 PROCEDURE — 97112 NEUROMUSCULAR REEDUCATION: CPT | Performed by: PHYSICAL THERAPIST

## 2019-03-26 PROCEDURE — 97140 MANUAL THERAPY 1/> REGIONS: CPT | Performed by: PHYSICAL THERAPIST

## 2019-03-26 NOTE — PROGRESS NOTES
Daily Note     Today's date: 3/26/2019  Patient name: Kavya Bonilla  : 1960  MRN: [id number]  Referring provider: Fercho Woodruff MD  Dx:   Encounter Diagnosis     ICD-10-CM    1  Tear of right rotator cuff, unspecified tear extent M75 101    2  Complete tear of right rotator cuff M75 121                   Subjective: No new c/o today  Objective: See treatment diary below; PT 20' LATE - discussed his habitual lateness 2 visits ago  DOS:  (19= 12 weeks post op)  FOTO/RE: 3/12/19  POC through 19  Visit # 23    Manual: 10'  Grade 3-4 GHJ mobs/distraction  PROM/manual stretching as per protocol       TherEx:   Pulleys flexion 3'  OH PBall wall throws NT  Table push ups 3x10  Chair squats w/ OH press 5# R/L 2x15  IR str w/ SOS 10x10"  TB B/L ER blue NT  SL abd 2# 10"x5 NP  Supine flexion 2# NP  Elbow plank serratus push-ups NT  Uni IR @90 moraima 6# NT  B/L flexion @ moraima 7 5# NT      Neuro re-ed: 20''  Lat pull downs moraima 13lbs 2x12  Decline row [place] 17# NT  Prone "T" NP  Prone "W" 1# NT  Prone W-Y-W 1# 2x10  D2 @ moraima 3# NT  Edinburgh diagonal HAbd blue 3x5 each  Habd into ER B/L yellow 2x15  Foam roll wall flexion w/ TB wrists 3x10  Wall clock yellow 3x          Assessment: Tolerated treatment well  Patient demonstrated fatigue post treatment and continues w/ habitual late arrivals which limits length of session  This has been addressed previously and again today  Offered pt different appt times, and reminded him I can't always keep him longer than scheduled time due to pts being scheduled after him  Plan: Progress treatment as tolerated  Pt was advised of 2ndary insurance reaching end of authorized visits  He states he will have new 2ndary insurance as of

## 2019-03-28 ENCOUNTER — APPOINTMENT (OUTPATIENT)
Dept: PHYSICAL THERAPY | Facility: CLINIC | Age: 59
End: 2019-03-28
Payer: MEDICARE

## 2019-03-29 ENCOUNTER — OFFICE VISIT (OUTPATIENT)
Dept: PHYSICAL THERAPY | Facility: CLINIC | Age: 59
End: 2019-03-29
Payer: MEDICARE

## 2019-03-29 DIAGNOSIS — M75.121 COMPLETE TEAR OF RIGHT ROTATOR CUFF: Primary | ICD-10-CM

## 2019-03-29 DIAGNOSIS — M75.101 TEAR OF RIGHT ROTATOR CUFF, UNSPECIFIED TEAR EXTENT: ICD-10-CM

## 2019-03-29 PROCEDURE — 97112 NEUROMUSCULAR REEDUCATION: CPT

## 2019-03-29 PROCEDURE — 97110 THERAPEUTIC EXERCISES: CPT

## 2019-03-29 PROCEDURE — 97140 MANUAL THERAPY 1/> REGIONS: CPT

## 2019-03-29 NOTE — PROGRESS NOTES
Daily Note     Today's date: 3/29/2019  Patient name: Savanna Hodgson  : 1960  MRN: [id number]  Referring provider: Clem Barroso MD  Dx:   Encounter Diagnosis     ICD-10-CM    1  Complete tear of right rotator cuff M75 121    2  Tear of right rotator cuff, unspecified tear extent M75 101        Start Time: 5237  Stop Time: 0950  Total time in clinic (min): 45 minutes   Subjective: No new complaints today  Patient stated that he wants to hold off on scheduling more PT until he sees his MD  He is still awaiting information on his new insurance policy  Objective: See treatment diary below  Visit # 24    Manual: 10'  Grade 3-4 GHJ mobs/distraction  PROM/manual stretching as per protocol   Patient educat    TherEx:   Pulleys flexion 3'  OH PBall wall throws NT  Table push ups 3x10  Chair squats w/ OH press 5# R/L 2x15  IR str w/ SOS 10x10"  TB B/L ER blue NT  SL abd 2# 10"x5 NP  Supine flexion 2# NP  Elbow plank serratus push-ups NT  Uni IR @90 rachel 6#   B/L flexion @ rachel 7 5# NT      Neuro re-ed: 20''  Lat pull downs rachel 14lbs 2x12  Decline row Rachel 17# NT  Prone "T": 1 # 2  X 10   Prone "W" 1# 2 x 10  Prone W-Y-W 1# 2x10  D2 @ rachel 3# NT  [place] diagonal HAbd blue 3x5 each-NT  Habd into ER B/L red 2x15  Foam roll wall flexion w/ TB wrists 3x10  Wall clock red 3x    Assessment: Tolerated treatment well  No increase in pain with exercises today and tolerated strengthening progressions well with good form  Demonstrated muscular fatigue after sets of foam wall flexion with TB but was able to complete with good form until end of sets  Patient would benefit from continued PT in order to further progress UE strengthening so she can complete overhead activities and heavy household chores/yard work without risk of re injury  Plan: Continue per plan of care

## 2019-04-09 NOTE — PROGRESS NOTES
4/9/19 Called pt and left message re: new secondary insurance was approved  Does pt want to schedule further visits?  TT

## 2019-04-11 ENCOUNTER — OFFICE VISIT (OUTPATIENT)
Dept: PHYSICAL THERAPY | Facility: CLINIC | Age: 59
End: 2019-04-11
Payer: MEDICARE

## 2019-04-11 DIAGNOSIS — M75.121 COMPLETE TEAR OF RIGHT ROTATOR CUFF, UNSPECIFIED WHETHER TRAUMATIC: Primary | ICD-10-CM

## 2019-04-11 PROCEDURE — 97110 THERAPEUTIC EXERCISES: CPT | Performed by: PHYSICAL THERAPIST

## 2019-04-11 PROCEDURE — 97140 MANUAL THERAPY 1/> REGIONS: CPT | Performed by: PHYSICAL THERAPIST

## 2019-04-11 PROCEDURE — 97112 NEUROMUSCULAR REEDUCATION: CPT | Performed by: PHYSICAL THERAPIST

## 2019-04-12 ENCOUNTER — OFFICE VISIT (OUTPATIENT)
Dept: PHYSICAL THERAPY | Facility: CLINIC | Age: 59
End: 2019-04-12
Payer: MEDICARE

## 2019-04-12 DIAGNOSIS — M75.121 COMPLETE TEAR OF RIGHT ROTATOR CUFF, UNSPECIFIED WHETHER TRAUMATIC: Primary | ICD-10-CM

## 2019-04-12 PROCEDURE — 97110 THERAPEUTIC EXERCISES: CPT

## 2019-04-12 PROCEDURE — 97140 MANUAL THERAPY 1/> REGIONS: CPT

## 2019-04-12 PROCEDURE — 97112 NEUROMUSCULAR REEDUCATION: CPT

## 2019-04-17 ENCOUNTER — OFFICE VISIT (OUTPATIENT)
Dept: PHYSICAL THERAPY | Facility: CLINIC | Age: 59
End: 2019-04-17
Payer: MEDICARE

## 2019-04-17 DIAGNOSIS — M75.121 COMPLETE TEAR OF RIGHT ROTATOR CUFF, UNSPECIFIED WHETHER TRAUMATIC: Primary | ICD-10-CM

## 2019-04-17 PROCEDURE — 97140 MANUAL THERAPY 1/> REGIONS: CPT | Performed by: PHYSICAL THERAPIST

## 2019-04-17 PROCEDURE — 97112 NEUROMUSCULAR REEDUCATION: CPT | Performed by: PHYSICAL THERAPIST

## 2019-04-19 ENCOUNTER — OFFICE VISIT (OUTPATIENT)
Dept: PHYSICAL THERAPY | Facility: CLINIC | Age: 59
End: 2019-04-19
Payer: MEDICARE

## 2019-04-19 DIAGNOSIS — M75.121 COMPLETE TEAR OF RIGHT ROTATOR CUFF, UNSPECIFIED WHETHER TRAUMATIC: Primary | ICD-10-CM

## 2019-04-19 PROCEDURE — 97112 NEUROMUSCULAR REEDUCATION: CPT

## 2019-04-19 PROCEDURE — 97140 MANUAL THERAPY 1/> REGIONS: CPT

## 2019-04-24 ENCOUNTER — OFFICE VISIT (OUTPATIENT)
Dept: PHYSICAL THERAPY | Facility: CLINIC | Age: 59
End: 2019-04-24
Payer: MEDICARE

## 2019-04-24 DIAGNOSIS — M75.121 COMPLETE TEAR OF RIGHT ROTATOR CUFF, UNSPECIFIED WHETHER TRAUMATIC: Primary | ICD-10-CM

## 2019-04-24 PROCEDURE — 97140 MANUAL THERAPY 1/> REGIONS: CPT | Performed by: PHYSICAL THERAPIST

## 2019-04-24 PROCEDURE — 97110 THERAPEUTIC EXERCISES: CPT | Performed by: PHYSICAL THERAPIST

## 2019-04-26 ENCOUNTER — OFFICE VISIT (OUTPATIENT)
Dept: PHYSICAL THERAPY | Facility: CLINIC | Age: 59
End: 2019-04-26
Payer: MEDICARE

## 2019-04-26 DIAGNOSIS — M75.121 COMPLETE TEAR OF RIGHT ROTATOR CUFF, UNSPECIFIED WHETHER TRAUMATIC: Primary | ICD-10-CM

## 2019-04-26 PROCEDURE — 97110 THERAPEUTIC EXERCISES: CPT

## 2019-04-29 ENCOUNTER — OFFICE VISIT (OUTPATIENT)
Dept: OBGYN CLINIC | Facility: CLINIC | Age: 59
End: 2019-04-29
Payer: MEDICARE

## 2019-04-29 VITALS
HEIGHT: 72 IN | WEIGHT: 210.8 LBS | DIASTOLIC BLOOD PRESSURE: 82 MMHG | BODY MASS INDEX: 28.55 KG/M2 | SYSTOLIC BLOOD PRESSURE: 136 MMHG | HEART RATE: 62 BPM

## 2019-04-29 DIAGNOSIS — Z98.890 STATUS POST RIGHT ROTATOR CUFF REPAIR: ICD-10-CM

## 2019-04-29 DIAGNOSIS — M75.121 COMPLETE TEAR OF RIGHT ROTATOR CUFF, UNSPECIFIED WHETHER TRAUMATIC: Primary | ICD-10-CM

## 2019-04-29 PROCEDURE — 99213 OFFICE O/P EST LOW 20 MIN: CPT | Performed by: ORTHOPAEDIC SURGERY

## 2019-04-30 ENCOUNTER — EVALUATION (OUTPATIENT)
Dept: PHYSICAL THERAPY | Facility: CLINIC | Age: 59
End: 2019-04-30
Payer: MEDICARE

## 2019-04-30 DIAGNOSIS — M75.121 COMPLETE TEAR OF RIGHT ROTATOR CUFF, UNSPECIFIED WHETHER TRAUMATIC: Primary | ICD-10-CM

## 2019-04-30 PROCEDURE — 97140 MANUAL THERAPY 1/> REGIONS: CPT | Performed by: PHYSICAL THERAPIST

## 2019-04-30 PROCEDURE — 97112 NEUROMUSCULAR REEDUCATION: CPT | Performed by: PHYSICAL THERAPIST

## 2020-07-07 ENCOUNTER — TELEPHONE (OUTPATIENT)
Dept: FAMILY MEDICINE CLINIC | Facility: CLINIC | Age: 60
End: 2020-07-07

## 2020-07-07 ENCOUNTER — TELEMEDICINE (OUTPATIENT)
Dept: FAMILY MEDICINE CLINIC | Facility: CLINIC | Age: 60
End: 2020-07-07
Payer: MEDICARE

## 2020-07-07 DIAGNOSIS — Z20.828 EXPOSURE TO SARS-ASSOCIATED CORONAVIRUS: ICD-10-CM

## 2020-07-07 DIAGNOSIS — Z20.822 COVID-19 RULED OUT: Primary | ICD-10-CM

## 2020-07-07 PROCEDURE — 99213 OFFICE O/P EST LOW 20 MIN: CPT | Performed by: FAMILY MEDICINE

## 2020-07-07 PROCEDURE — 1036F TOBACCO NON-USER: CPT | Performed by: FAMILY MEDICINE

## 2020-07-07 PROCEDURE — U0003 INFECTIOUS AGENT DETECTION BY NUCLEIC ACID (DNA OR RNA); SEVERE ACUTE RESPIRATORY SYNDROME CORONAVIRUS 2 (SARS-COV-2) (CORONAVIRUS DISEASE [COVID-19]), AMPLIFIED PROBE TECHNIQUE, MAKING USE OF HIGH THROUGHPUT TECHNOLOGIES AS DESCRIBED BY CMS-2020-01-R: HCPCS

## 2020-07-07 NOTE — PROGRESS NOTES
COVID-19 Virtual Visit     Assessment/Plan:    Problem List Items Addressed This Visit     None      Visit Diagnoses     COVID-19 ruled out    -  Primary    Relevant Orders    SARS-CoV2 Antibody, Total (IgG, IgA, IgM) [facility]    Exposure to SARS-associated coronavirus        Relevant Orders    MISC COVID-19 TEST- Col[facility]   [facility] or [facility]        This virtual check-in was done via Doximity and patient was informed that this is a secure, HIPAA-compliant platform  He agrees to proceed     Disposition:      I referred Tracy Darling to one of our centralized sites for a COVID-19 swab    As a result of this visit, I have not referred the patient for further respiratory evaluation  I spent 5 minutes directly with the patient during this visit    Encounter provider Foster Stewart MD    Provider located at [address] [place] [id number]    Recent Visits  No visits were found meeting these conditions  Showing recent visits within past 7 days and meeting all other requirements     Today's Visits  Date Type Provider Dept   07/07/20 Telemedicine Foster Stewart MD [address] [place] [address] today's visits and meeting all other requirements     Future Appointments  No visits were found meeting these conditions  Showing future appointments within next 150 days and meeting all other requirements        Patient agrees to participate in a virtual check in via telephone or video visit instead of presenting to the office to address urgent/immediate medical needs  Patient is aware this is a billable service  After connecting through [place], the patient was identified by name and date of birth  Main Spivey was informed that this was a telemedicine visit and that the exam was being conducted confidentially over secure lines  My office door was closed  No one else was in the room    Main Spivey acknowledged consent and understanding of privacy and security of the telemedicine visit  I informed the patient that I have reviewed his record in Epic and presented the opportunity for him to ask any questions regarding the visit today  The patient agreed to participate  Subjective  Taurus Blanchard is a 64 y o  male who is concerned about COVID-19  He reports sore throat, anorexia and anosmia  He has not experienced fever, repeated shaking with chills, cough, shortness of breath, abdominal pain and diarrhea He has not had contact with a person who is under investigation for or who is positive for COVID-19 within the last 14 days  He has not been hospitalized recently for fever and/or lower respiratory symptoms  Past Medical History:   Diagnosis Date    Asthma     Hyperlipidemia     Nasal polyps     Near syncope     Sleep apnea, obstructive        Past Surgical History:   Procedure Laterality Date    ARTHROSCOPY KNEE      COLONOSCOPY      VT SHLDR ARTHROSCOP,SURG,W/REMOVAL,LOOSE/FB Right 12/6/2018    Procedure: ARTHROSCOPIC SHOULDER;  Surgeon: Ebony Raymond MD;  Location: [address] [place] [address];  Service: Orthopedics    VT SHLDR ARTHROSCOP,SURG,W/ROTAT CUFF REPR Right 12/6/2018    Procedure: REPAIR ROTATOR CUFF  ARTHROSCOPIC WITH POSSIBLE REMOVAL LOOSE BODIES AND POSSIBLE DECOMPRESSION;  Surgeon: Ebony Raymond MD;  Location: [address] [place] [address];  Service: Orthopedics    SINUS SURGERY         Current Outpatient Medications   Medication Sig Dispense Refill    esomeprazole (NexIUM) 20 mg capsule 40 mg daily in the early morning    0    Fluticasone Propionate (Xhance) 93 MCG/ACT EXHU 1 spray into each nostril 2 (two) times a day 16 mL 5    fluticasone-vilanterol (BREO ELLIPTA) 200-25 MCG/INH inhaler Inhale 1 puff daily Rinse mouth after use   3 Inhaler 3    montelukast (Singulair) 10 mg tablet Take 1 tablet (10 mg total) by mouth every morning 90 tablet 3    Multiple Vitamin (MULTIVITAMIN) tablet Take 1 tablet by mouth daily  naproxen (EC NAPROSYN) 500 MG EC tablet Take 1 tablet (500 mg total) by mouth 2 (two) times a day with meals 60 tablet 0    rOPINIRole (REQUIP) 2 mg tablet Take 1 tablet (2 mg total) by mouth daily at bedtime 90 tablet 3    rosuvastatin (CRESTOR) 10 MG tablet Take 1 tablet (10 mg total) by mouth daily (Patient taking differently: Take 10 mg by mouth daily at bedtime  ) 30 tablet 3    valACYclovir (VALTREX) 500 mg tablet Take 500 mg by mouth 2 (two) times a day  0    VENTOLIN  (90 Base) MCG/ACT inhaler Inhale 2 puffs every 4 (four) hours as needed for wheezing or shortness of breath 3 Inhaler 3     No current facility-administered medications for this visit  No Known Allergies    Review of Systems    Video Exam    There were no vitals filed for this visit  Edna Townsend appears healthy, alert, no distress  Physical Exam     VIRTUAL VISIT DISCLAIMER    Bennett Garcia acknowledges that he has consented to an online visit or consultation  He understands that the online visit is based solely on information provided by him, and that, in the absence of a face-to-face physical evaluation by the physician, the diagnosis he receives is both limited and provisional in terms of accuracy and completeness  This is not intended to replace a full medical face-to-face evaluation by the physician  Bennett Garcia understands and accepts these terms

## 2020-07-10 ENCOUNTER — TRANSCRIBE ORDERS (OUTPATIENT)
Dept: LAB | Facility: CLINIC | Age: 60
End: 2020-07-10

## 2020-07-14 ENCOUNTER — TELEPHONE (OUTPATIENT)
Dept: FAMILY MEDICINE CLINIC | Facility: CLINIC | Age: 60
End: 2020-07-14

## 2020-07-14 ENCOUNTER — APPOINTMENT (OUTPATIENT)
Dept: LAB | Facility: CLINIC | Age: 60
End: 2020-07-14
Payer: MEDICARE

## 2020-07-14 LAB — SARS-COV-2 RNA SPEC QL NAA+PROBE: NOT DETECTED

## 2020-07-14 NOTE — TELEPHONE ENCOUNTER
----- Message from Matthew Lisa MD sent at 7/14/2020  9:07 AM EDT -----  Pl, advise pt -  Negative COVID test -  OK to go for AB now

## 2020-07-15 ENCOUNTER — TELEPHONE (OUTPATIENT)
Dept: FAMILY MEDICINE CLINIC | Facility: CLINIC | Age: 60
End: 2020-07-15

## 2020-07-15 NOTE — TELEPHONE ENCOUNTER
----- Message from Fabiola Castro MD sent at 7/15/2020 10:39 AM EDT -----  Pl, advise pt -  Negative COVID AB test

## 2020-10-22 ENCOUNTER — TELEMEDICINE (OUTPATIENT)
Dept: PULMONOLOGY | Facility: MEDICAL CENTER | Age: 60
End: 2020-10-22
Payer: MEDICARE

## 2020-10-22 DIAGNOSIS — G47.33 OSA (OBSTRUCTIVE SLEEP APNEA): ICD-10-CM

## 2020-10-22 DIAGNOSIS — J45.40 MODERATE PERSISTENT ASTHMA, UNCOMPLICATED: Primary | ICD-10-CM

## 2020-10-22 DIAGNOSIS — J45.909 UNCOMPLICATED ASTHMA, UNSPECIFIED ASTHMA SEVERITY, UNSPECIFIED WHETHER PERSISTENT: ICD-10-CM

## 2020-10-22 PROCEDURE — 99213 OFFICE O/P EST LOW 20 MIN: CPT | Performed by: INTERNAL MEDICINE

## 2020-10-22 RX ORDER — FLUTICASONE FUROATE AND VILANTEROL 200; 25 UG/1; UG/1
1 POWDER RESPIRATORY (INHALATION) DAILY
Qty: 3 INHALER | Refills: 3 | Status: SHIPPED | OUTPATIENT
Start: 2020-10-22

## 2020-10-22 RX ORDER — MONTELUKAST SODIUM 10 MG/1
10 TABLET ORAL EVERY MORNING
Qty: 90 TABLET | Refills: 3 | Status: SHIPPED | OUTPATIENT
Start: 2020-10-22 | End: 2021-03-26 | Stop reason: SDUPTHER

## 2020-10-30 ENCOUNTER — TELEMEDICINE (OUTPATIENT)
Dept: FAMILY MEDICINE CLINIC | Facility: CLINIC | Age: 60
End: 2020-10-30
Payer: MEDICARE

## 2020-10-30 DIAGNOSIS — Z00.00 MEDICARE ANNUAL WELLNESS VISIT, INITIAL: Primary | ICD-10-CM

## 2020-10-30 DIAGNOSIS — E78.00 HYPERCHOLESTEROLEMIA: ICD-10-CM

## 2020-10-30 PROCEDURE — G0438 PPPS, INITIAL VISIT: HCPCS | Performed by: FAMILY MEDICINE

## 2021-01-12 ENCOUNTER — TELEPHONE (OUTPATIENT)
Dept: FAMILY MEDICINE CLINIC | Facility: CLINIC | Age: 61
End: 2021-01-12

## 2021-01-12 ENCOUNTER — TRANSCRIBE ORDERS (OUTPATIENT)
Dept: ADMINISTRATIVE | Facility: HOSPITAL | Age: 61
End: 2021-01-12

## 2021-01-12 ENCOUNTER — LAB (OUTPATIENT)
Dept: LAB | Facility: HOSPITAL | Age: 61
End: 2021-01-12
Attending: OTOLARYNGOLOGY
Payer: MEDICARE

## 2021-01-12 DIAGNOSIS — J34.2 DEVIATED NASAL SEPTUM: ICD-10-CM

## 2021-01-12 DIAGNOSIS — E78.00 HYPERCHOLESTEROLEMIA: ICD-10-CM

## 2021-01-12 DIAGNOSIS — J32.0 CHRONIC MAXILLARY SINUSITIS: ICD-10-CM

## 2021-01-12 DIAGNOSIS — K21.9 GASTROESOPHAGEAL REFLUX DISEASE, UNSPECIFIED WHETHER ESOPHAGITIS PRESENT: ICD-10-CM

## 2021-01-12 DIAGNOSIS — R09.81 NASAL CONGESTION: ICD-10-CM

## 2021-01-12 DIAGNOSIS — J34.3 HYPERTROPHY OF INFERIOR NASAL TURBINATE: ICD-10-CM

## 2021-01-12 DIAGNOSIS — J33.9 NASAL POLYPOSIS: ICD-10-CM

## 2021-01-12 DIAGNOSIS — J45.32 MILD PERSISTENT ASTHMA WITH STATUS ASTHMATICUS: ICD-10-CM

## 2021-01-12 DIAGNOSIS — J32.2 CHRONIC ETHMOIDAL SINUSITIS: ICD-10-CM

## 2021-01-12 DIAGNOSIS — J30.89 NON-SEASONAL ALLERGIC RHINITIS DUE TO OTHER ALLERGIC TRIGGER: ICD-10-CM

## 2021-01-12 DIAGNOSIS — J45.40 MODERATE PERSISTENT ASTHMA, UNCOMPLICATED: ICD-10-CM

## 2021-01-12 DIAGNOSIS — J32.1 CHRONIC FRONTAL SINUSITIS: ICD-10-CM

## 2021-01-12 DIAGNOSIS — J32.3 CHRONIC SPHENOIDAL SINUSITIS: ICD-10-CM

## 2021-01-12 LAB
ALBUMIN SERPL BCP-MCNC: 3.9 G/DL (ref 3.5–5)
ALP SERPL-CCNC: 82 U/L (ref 46–116)
ALT SERPL W P-5'-P-CCNC: 35 U/L (ref 12–78)
ANION GAP SERPL CALCULATED.3IONS-SCNC: 6 MMOL/L (ref 4–13)
AST SERPL W P-5'-P-CCNC: 22 U/L (ref 5–45)
BASOPHILS # BLD AUTO: 0.04 THOUSANDS/ΜL (ref 0–0.1)
BASOPHILS NFR BLD AUTO: 1 % (ref 0–1)
BILIRUB SERPL-MCNC: 0.5 MG/DL (ref 0.2–1)
BUN SERPL-MCNC: 14 MG/DL (ref 5–25)
CALCIUM SERPL-MCNC: 8.7 MG/DL (ref 8.3–10.1)
CHLORIDE SERPL-SCNC: 105 MMOL/L (ref 100–108)
CHOLEST SERPL-MCNC: 287 MG/DL (ref 50–200)
CO2 SERPL-SCNC: 30 MMOL/L (ref 21–32)
CREAT SERPL-MCNC: 1.01 MG/DL (ref 0.6–1.3)
EOSINOPHIL # BLD AUTO: 0.27 THOUSAND/ΜL (ref 0–0.61)
EOSINOPHIL NFR BLD AUTO: 4 % (ref 0–6)
ERYTHROCYTE [DISTWIDTH] IN BLOOD BY AUTOMATED COUNT: 12.1 % (ref 11.6–15.1)
GFR SERPL CREATININE-BSD FRML MDRD: 82 ML/MIN/1.73SQ M
GLUCOSE P FAST SERPL-MCNC: 94 MG/DL (ref 65–99)
HCT VFR BLD AUTO: 46.5 % (ref 36.5–49.3)
HDLC SERPL-MCNC: 35 MG/DL
HGB BLD-MCNC: 14.9 G/DL (ref 12–17)
IMM GRANULOCYTES # BLD AUTO: 0.02 THOUSAND/UL (ref 0–0.2)
IMM GRANULOCYTES NFR BLD AUTO: 0 % (ref 0–2)
LDLC SERPL CALC-MCNC: 214 MG/DL (ref 0–100)
LYMPHOCYTES # BLD AUTO: 2.21 THOUSANDS/ΜL (ref 0.6–4.47)
LYMPHOCYTES NFR BLD AUTO: 33 % (ref 14–44)
MCH RBC QN AUTO: 29.6 PG (ref 26.8–34.3)
MCHC RBC AUTO-ENTMCNC: 32 G/DL (ref 31.4–37.4)
MCV RBC AUTO: 92 FL (ref 82–98)
MONOCYTES # BLD AUTO: 0.35 THOUSAND/ΜL (ref 0.17–1.22)
MONOCYTES NFR BLD AUTO: 5 % (ref 4–12)
NEUTROPHILS # BLD AUTO: 3.89 THOUSANDS/ΜL (ref 1.85–7.62)
NEUTS SEG NFR BLD AUTO: 57 % (ref 43–75)
NONHDLC SERPL-MCNC: 252 MG/DL
NRBC BLD AUTO-RTO: 0 /100 WBCS
PLATELET # BLD AUTO: 182 THOUSANDS/UL (ref 149–390)
PMV BLD AUTO: 10 FL (ref 8.9–12.7)
POTASSIUM SERPL-SCNC: 3.7 MMOL/L (ref 3.5–5.3)
PROT SERPL-MCNC: 7.3 G/DL (ref 6.4–8.2)
RBC # BLD AUTO: 5.03 MILLION/UL (ref 3.88–5.62)
SODIUM SERPL-SCNC: 141 MMOL/L (ref 136–145)
TRIGL SERPL-MCNC: 188 MG/DL
WBC # BLD AUTO: 6.78 THOUSAND/UL (ref 4.31–10.16)

## 2021-01-12 PROCEDURE — 80061 LIPID PANEL: CPT

## 2021-01-12 PROCEDURE — 86003 ALLG SPEC IGE CRUDE XTRC EA: CPT

## 2021-01-12 PROCEDURE — 82785 ASSAY OF IGE: CPT

## 2021-01-12 PROCEDURE — 80053 COMPREHEN METABOLIC PANEL: CPT

## 2021-01-12 PROCEDURE — 86038 ANTINUCLEAR ANTIBODIES: CPT

## 2021-01-12 PROCEDURE — 86255 FLUORESCENT ANTIBODY SCREEN: CPT

## 2021-01-12 PROCEDURE — 85025 COMPLETE CBC W/AUTO DIFF WBC: CPT

## 2021-01-12 PROCEDURE — 36415 COLL VENOUS BLD VENIPUNCTURE: CPT

## 2021-01-12 NOTE — TELEPHONE ENCOUNTER
Called and spoke with patient's wife Rach Jordan  Patient not home now, she will have him call us back to schedule  Malissa Moreland

## 2021-01-12 NOTE — TELEPHONE ENCOUNTER
----- Message from Chapis Quinones MD sent at 1/12/2021  2:40 PM EST -----  Pl, advise pt -  Jesu is needed for f/u high cholesterol

## 2021-01-13 ENCOUNTER — TELEPHONE (OUTPATIENT)
Dept: FAMILY MEDICINE CLINIC | Facility: CLINIC | Age: 61
End: 2021-01-13

## 2021-01-13 LAB
A ALTERNATA IGE QN: 9.02 KUA/I
A FUMIGATUS IGE QN: 0.86 KUA/I
ALLERGEN COMMENT: ABNORMAL
BERMUDA GRASS IGE QN: 1.03 KUA/I
BOXELDER IGE QN: 1.05 KUA/I
C HERBARUM IGE QN: 9.74 KUA/I
CAT DANDER IGE QN: <0.1 KUA/I
CMN PIGWEED IGE QN: 1.11 KUA/I
COMMON RAGWEED IGE QN: 0.93 KUA/I
COTTONWOOD IGE QN: 1 KUA/I
D FARINAE IGE QN: 1.59 KUA/I
D PTERONYSS IGE QN: 1.5 KUA/I
DOG DANDER IGE QN: 0.13 KUA/I
LONDON PLANE IGE QN: 1.07 KUA/I
MOUSE URINE PROT IGE QN: <0.1 KUA/I
MT JUNIPER IGE QN: 1.49 KUA/I
MUGWORT IGE QN: 0.88 KUA/I
P NOTATUM IGE QN: 4.43 KUA/I
ROACH IGE QN: 1.11 KUA/I
RYE IGE QN: NEGATIVE
SHEEP SORREL IGE QN: 1.11 KUA/I
SILVER BIRCH IGE QN: 0.68 KUA/I
TIMOTHY IGE QN: 1.12 KUA/I
TOTAL IGE SMQN RAST: 1210 KU/L (ref 0–113)
WALNUT IGE QN: 1.04 KUA/I
WHITE ASH IGE QN: 1.4 KUA/I
WHITE ELM IGE QN: 1.07 KUA/I
WHITE MULBERRY IGE QN: 0.66 KUA/I
WHITE OAK IGE QN: 1.05 KUA/I

## 2021-01-13 NOTE — TELEPHONE ENCOUNTER
----- Message from Claudia Root MD sent at 1/12/2021  2:40 PM EST -----  Pl, advise pt -  Jesu is needed for f/u high cholesterol

## 2021-01-14 LAB
C-ANCA TITR SER IF: NORMAL TITER
MYELOPEROXIDASE AB SER IA-ACNC: <9 U/ML (ref 0–9)
P-ANCA ATYPICAL TITR SER IF: NORMAL TITER
P-ANCA TITR SER IF: NORMAL TITER
PROTEINASE3 AB SER IA-ACNC: <3.5 U/ML (ref 0–3.5)

## 2021-01-19 ENCOUNTER — TELEMEDICINE (OUTPATIENT)
Dept: FAMILY MEDICINE CLINIC | Facility: CLINIC | Age: 61
End: 2021-01-19
Payer: MEDICARE

## 2021-01-19 DIAGNOSIS — G25.81 RESTLESS LEG SYNDROME: ICD-10-CM

## 2021-01-19 DIAGNOSIS — E78.00 HYPERCHOLESTEROLEMIA: ICD-10-CM

## 2021-01-19 PROCEDURE — 99441 PR PHYS/QHP TELEPHONE EVALUATION 5-10 MIN: CPT | Performed by: FAMILY MEDICINE

## 2021-01-19 RX ORDER — ROPINIROLE 2 MG/1
2 TABLET, FILM COATED ORAL
Qty: 90 TABLET | Refills: 3 | Status: SHIPPED | OUTPATIENT
Start: 2021-01-19

## 2021-01-19 RX ORDER — ROSUVASTATIN CALCIUM 10 MG/1
10 TABLET, COATED ORAL
Qty: 90 TABLET | Refills: 3 | Status: SHIPPED | OUTPATIENT
Start: 2021-01-19 | End: 2022-01-24

## 2021-01-19 NOTE — PROGRESS NOTES
Virtual Brief Visit    Assessment/Plan:    Problem List Items Addressed This Visit        Other    Hypercholesterolemia    Relevant Medications    rosuvastatin (CRESTOR) 10 MG tablet    Restless leg syndrome    Relevant Medications    rOPINIRole (REQUIP) 2 mg tablet        rto in 6 m         Reason for visit is   Chief Complaint   Patient presents with    HLD        Encounter provider Fior Cárdenas MD    Provider located at [address] [place] [phone]    Recent Visits  Date Type Provider Dept   01/13/21 Telephone  [facility]    01/12/21 Telephone [facility] recent visits within past 7 days and meeting all other requirements     Today's Visits  Date Type Provider Dept   01/19/21 Telemedicine Fior Cárdenas  [facility] today's visits and meeting all other requirements     Future Appointments  No visits were found meeting these conditions  Showing future appointments within next 150 days and meeting all other requirements        After connecting through telephone, the patient was identified by name and date of birth  Carla Cai was informed that this is a telemedicine visit and that the visit is being conducted through telephone  My office door was closed  No one else was in the room  He acknowledged consent and understanding of privacy and security of the platform  The patient has agreed to participate and understands he can discontinue the visit at any time  Patient is aware this is a billable service  Subjective    Carla Cai is a 64 y o  male     Pt is seeing for f/u HLD -  Stopped taking Crestor few m ago -  Needs to restart   Recent Results (from the past 672 hour(s))  -CBC and differential  Collection Time: 01/12/21  1:40 PM       Result                      Value             Ref Range           WBC                         6 78 4 31 - 10 16*       RBC                         5 03              3 88 - 5 62 *       Hemoglobin                  14 9              12 0 - 17 0 *       Hematocrit                  46 5              36 5 - 49 3 %       MCV                         92                82 - 98 fL          MCH                         29 6              26 8 - 34 3 *       MCHC                        32 0              31 4 - 37 4 *       RDW                         12 1              11 6 - 15 1 %       MPV                         10 0              8 9 - 12 7 fL       Platelets                   182               149 - 390 Th*       nRBC                        0                 /100 WBCs           Neutrophils Relative        57                43 - 75 %           Immat GRANS %               0                 0 - 2 %             Lymphocytes Relative        33                14 - 44 %           Monocytes Relative          5                 4 - 12 %            Eosinophils Relative        4                 0 - 6 %             Basophils Relative          1                 0 - 1 %             Neutrophils Absolute        3 89              1 85 - 7 62 *       Immature Grans Absolute     0 02              0 00 - 0 20 *       Lymphocytes Absolute        2 21              0 60 - 4 47 *       Monocytes Absolute          0 35              0 17 - 1 22 *       Eosinophils Absolute        0 27              0 00 - 0 61 *       Basophils Absolute          0 04              0 00 - 0 10 *  -[facility] Allergy Panel, Adult  Collection Time: 01/12/21  1:40 PM       Result                      Value             Ref Range           A  ALTERNATA                 9 02 (H)          0 00 - 0 09 *       A  FUMIGATUS                 0 86 (H)          0 00 - 0 09 *       [place] Grass               1 03 (H)          0 00 - 0 09 *       [place]                   1  05 (H)          0 00 - 0 09 *       Cat Epithellium-Dander      <0 10             0 00 - 0 09 *       C HERBARUM 9 74 (H)          0 00 - 0 09 *       Cockroach                   1 11 (H)          0 00 - 0 09 *       Common Silver Birch         0 68 (H)          0 00 - 0 09 *       [place]                  1 00 (H)          0 00 - 0 09 *       D  farinae                  1 59 (H)          0 00 - 0 09 *       D  pteronyssinus            1 50 (H)          0 00 - 0 09 *       Dog Dander                  0 13 (H)          0 00 - 0 09 *       Elm IgE                     1  07 (H)          0 00 - 0 09 [facility] HEIGHTS Tree         1 49 (H)          0 00 - 0 09 *       Mugwort                     0 88 (H)          0 00 - 0 09 *       [place] Tree               0 66 (H)          0 00 - 0 09 *       [place]                         1  05 (H)          0 00 - 0 09 *       P CHRYSOGENUM               4 43 (H)          0 00 - 0 09 *       Rough Pigweed  IgE          1 11 (H)          0 00 - 0 09 *       Common Ragweed              0 93 (H)          0 00 - 0 09 *       Sheep [place] IgE            1 11 (H)          0 00 - 0 09 *       [place] Tree               1  07 (H)          0 00 - 0 09 *       [place] Grass               1 12 (H)          0 00 - 0 09 *       [place] Tree                 1  04 (H)          0 00 - 0 09 *       [place] Tree              1 40 (H)          0 00 - 0 09 *       IgE                         1,210 (H)         0 - 113 kU/l        Allergen Comment            See Below                             MOUSE URINE                 <0 10             0 00 - 0 09 *  -CHRISTIANE Screen w/ Reflex to Titer/Pattern  Collection Time: 01/12/21  1:40 PM       Result                      Value             Ref Range           CHRISTIANE                         Negative          Negative       -Anti-neutrophilic cytoplasmic antibody  Collection Time: 01/12/21  1:40 PM       Result                      Value             Ref Range           C-ANCA                      <1:20             Neg:<1:20 ti*       Atypical pANCA <1:20             Neg:<1:20 ti*       MPO AB                      <9 0              0 0 - 9 0 U/*       GA-3 AB                     <3 5              0 0 - 3 5 U/*       P-ANCA                      <1:20             Neg:<1:20 ti*  -Comprehensive metabolic panel  Collection Time: 01/12/21  1:40 PM       Result                      Value             Ref Range           Sodium                      141               136 - 145 mm*       Potassium                   3 7               3 5 - 5 3 mm*       Chloride                    105               100 - 108 mm*       CO2                         30                21 - 32 mmol*       ANION GAP                   6                 4 - 13 mmol/L       BUN                         14                5 - 25 mg/dL        Creatinine                  1 01              0 60 - 1 30 *       Glucose, Fasting            94                65 - 99 mg/dL       Calcium                     8 7               8 3 - 10 1 m*       AST                         22                5 - 45 U/L          ALT                         35                12 - 78 U/L         Alkaline Phosphatase        82                46 - 116 U/L        Total Protein               7 3               6 4 - 8 2 g/*       Albumin                     3 9               3 5 - 5 0 g/*       Total Bilirubin             0 50              0 20 - 1 00 *       eGFR                        82                ml/min/1 73s*  -Lipid panel  Collection Time: 01/12/21  1:40 PM       Result                      Value             Ref Range           Cholesterol                 287 (H)           50 - 200 mg/*       Triglycerides               188 (H)           <=150 mg/dL         HDL, Direct                 35 (L)            >=40 mg/dL          LDL Calculated              214 (H)           0 - 100 mg/dL       Non-HDL-Chol (CHOL-HDL)     252               mg/dl               Past Medical History:   Diagnosis Date    Asthma     Hyperlipidemia     Nasal polyps     Near syncope     Sleep apnea, obstructive        Past Surgical History:   Procedure Laterality Date    ARTHROSCOPY KNEE      COLONOSCOPY      ME SHLDR ARTHROSCOP,SURG,W/REMOVAL,LOOSE/FB Right 12/6/2018    Procedure: ARTHROSCOPIC SHOULDER;  Surgeon: Yessi Baugh MD;  Location: [address] [place] [address];  Service: Orthopedics    ME SHLDR ARTHROSCOP,SURG,W/ROTAT CUFF REPR Right 12/6/2018    Procedure: REPAIR ROTATOR CUFF  ARTHROSCOPIC WITH POSSIBLE REMOVAL LOOSE BODIES AND POSSIBLE DECOMPRESSION;  Surgeon: Yessi Baugh MD;  Location: [address] [place] [address];  Service: Orthopedics    SINUS SURGERY         Current Outpatient Medications   Medication Sig Dispense Refill    esomeprazole (NexIUM) 20 mg capsule Take 1 capsule (20 mg total) by mouth 2 (two) times a day before meals 60 capsule 6    Fluticasone Propionate (Xhance) 93 MCG/ACT EXHU 1 spray into each nostril 2 (two) times a day 16 mL 5    fluticasone-vilanterol (BREO ELLIPTA) 200-25 MCG/INH inhaler Inhale 1 puff daily Rinse mouth after use  3 Inhaler 3    montelukast (Singulair) 10 mg tablet Take 1 tablet (10 mg total) by mouth every morning 90 tablet 3    Multiple Vitamin (MULTIVITAMIN) tablet Take 1 tablet by mouth daily      naproxen (EC NAPROSYN) 500 MG EC tablet Take 1 tablet (500 mg total) by mouth 2 (two) times a day with meals 60 tablet 0    rOPINIRole (REQUIP) 2 mg tablet Take 1 tablet (2 mg total) by mouth daily at bedtime 90 tablet 3    rosuvastatin (CRESTOR) 10 MG tablet Take 1 tablet (10 mg total) by mouth daily at bedtime -  Not taking  90 tablet 3    valACYclovir (VALTREX) 500 mg tablet Take 500 mg by mouth 2 (two) times a day  0    Ventolin  (90 Base) MCG/ACT inhaler Inhale 2 puffs every 4 (four) hours as needed for wheezing or shortness of breath 3 Inhaler 3     No current facility-administered medications for this visit  No Known Allergies    Review of Systems   Constitutional: Negative  Respiratory: Negative  Cardiovascular: Negative  Gastrointestinal: Negative  Genitourinary: Negative  Musculoskeletal: Negative  Skin: Negative  Neurological: Negative  There were no vitals filed for this visit  I spent 5 minutes directly with the patient during this visit    VIRTUAL VISIT DISCLAIMER    Ramona Victor acknowledges that he has consented to an online visit or consultation  He understands that the online visit is based solely on information provided by him, and that, in the absence of a face-to-face physical evaluation by the physician, the diagnosis he receives is both limited and provisional in terms of accuracy and completeness  This is not intended to replace a full medical face-to-face evaluation by the physician  Ramona Victor understands and accepts these terms

## 2021-02-05 ENCOUNTER — TELEPHONE (OUTPATIENT)
Dept: PULMONOLOGY | Facility: CLINIC | Age: 61
End: 2021-02-05

## 2021-02-05 ENCOUNTER — TELEMEDICINE (OUTPATIENT)
Dept: PULMONOLOGY | Facility: CLINIC | Age: 61
End: 2021-02-05
Payer: MEDICARE

## 2021-02-05 VITALS — HEIGHT: 72 IN | BODY MASS INDEX: 27.12 KG/M2

## 2021-02-05 DIAGNOSIS — J45.50 SEVERE PERSISTENT ASTHMA, UNSPECIFIED WHETHER COMPLICATED: Primary | ICD-10-CM

## 2021-02-05 DIAGNOSIS — G25.81 RESTLESS LEG SYNDROME: ICD-10-CM

## 2021-02-05 DIAGNOSIS — G47.33 OSA (OBSTRUCTIVE SLEEP APNEA): ICD-10-CM

## 2021-02-05 PROCEDURE — 1036F TOBACCO NON-USER: CPT | Performed by: INTERNAL MEDICINE

## 2021-02-05 PROCEDURE — 99214 OFFICE O/P EST MOD 30 MIN: CPT | Performed by: INTERNAL MEDICINE

## 2021-02-05 NOTE — PROGRESS NOTES
Virtual Regular Visit      Assessment/Plan:    Problem List Items Addressed This Visit        Respiratory    Asthma - Primary     · His asthma still not well controlled  ·  his ACT score today is 17  ·  but he feels better than last visit  ·  he is adherent using his Breo every day and Singulair every night as well as nasal corticosteroid spray  ·  his allergy panel is not significant for any specific allergens except that he is IgE level is more than 1200  ·  at this point I think the patient would be a good candidate to start him on Xolair  ·  I would like to obtain new pulmonary function test with bronchodilator response  ·  varicella IgG antibodies level  ·  counseled the patient and he agrees with the plan he will need an in-person visit prior to ordering the Xolair         Relevant Orders    Complete PFT with post bronchodilator    Diagnostic Sleep Study    Varicella zoster antibody, IgG    TAJ (obstructive sleep apnea)     ·  Does not currently use CPAP  ·  he  Did not tolerate in the past  ·  I would like to reestablish diagnosis and do in-lab diagnostic sleep study in view of his history of asthma  ·  based on the results of the sleep study we would rediscuss and re-evaluate if he needs to be back on CPAP with different types of masks versus other alternative therapies  Relevant Orders    Diagnostic Sleep Study       Other    Restless leg syndrome      Currently on Requip                    Reason for visit is   Chief Complaint   Patient presents with    Follow-up    Virtual Regular Visit        Encounter provider Raulito Morejon MD    Provider located at [address] [place] [address]      Recent Visits  No visits were found meeting these conditions     Showing recent visits within past 7 days and meeting all other requirements     Today's Visits  Date Type Provider Dept   02/05/21 Sangeeta Tavera Micah Morillo MD [facility]   Showing today's visits and meeting all other requirements     Future Appointments  No visits were found meeting these conditions  Showing future appointments within next 150 days and meeting all other requirements        The patient was identified by name and date of birth  Aguila Lott was informed that this is a telemedicine visit and that the visit is being conducted through [facility] and patient was informed that this is a secure, HIPAA-compliant platform  He agrees to proceed     My office door was closed  No one else was in the room  He acknowledged consent and understanding of privacy and security of the video platform  The patient has agreed to participate and understands they can discontinue the visit at any time  Patient is aware this is a billable service  Subjective  Aguila Lott is a 64 y o  male       HPI:  Aguila Lott is a 64 y o  male who With past medical history as detailed below today having a visual visit for follow-up on his bronchial asthma last visit he was totally uncontrolled he was not adherent to using his [place] / [place] Handler I counseled him extensively he has been using Breo every day since then he tells me that his symptoms has been relatively better but overall his asthma is not yet well controlled his ACT score today was 17  He has been using his albuterol multiple times per week and still experiences occasional cough and wheezing  unfortunately he did not get his pulmonary function test done since last visit   he had his blood work done which came  Significant for high IgE as detailed below             Past Medical History:   Diagnosis Date    Asthma     Hyperlipidemia     Nasal polyps     Near syncope     Sleep apnea, obstructive        Past Surgical History:   Procedure Laterality Date    ARTHROSCOPY KNEE      COLONOSCOPY      [place] SHLDR ARTHROSCOP,SURG,W/REMOVAL,LOOSE/FB Right 12/6/2018    Procedure: ARTHROSCOPIC SHOULDER;  Surgeon: Lady Masood MD;  Location: [address] [place] [address];  Service: Orthopedics    [place] SHLDR ARTHROSCOP,SURG,W/ROTAT CUFF REPR Right 12/6/2018    Procedure: REPAIR ROTATOR CUFF  ARTHROSCOPIC WITH POSSIBLE REMOVAL LOOSE BODIES AND POSSIBLE DECOMPRESSION;  Surgeon: Lady Masood MD;  Location: [address] [place] [address];  Service: Orthopedics    SINUS SURGERY         Current Outpatient Medications   Medication Sig Dispense Refill    esomeprazole (NexIUM) 20 mg capsule Take 1 capsule (20 mg total) by mouth 2 (two) times a day before meals 60 capsule 6    Fluticasone Propionate (Xhance) 93 MCG/ACT EXHU 1 spray into each nostril 2 (two) times a day 16 mL 5    fluticasone-vilanterol (BREO ELLIPTA) 200-25 MCG/INH inhaler Inhale 1 puff daily Rinse mouth after use  3 Inhaler 3    montelukast (Singulair) 10 mg tablet Take 1 tablet (10 mg total) by mouth every morning 90 tablet 3    Multiple Vitamin (MULTIVITAMIN) tablet Take 1 tablet by mouth daily      naproxen (EC NAPROSYN) 500 MG EC tablet Take 1 tablet (500 mg total) by mouth 2 (two) times a day with meals 60 tablet 0    rOPINIRole (REQUIP) 2 mg tablet Take 1 tablet (2 mg total) by mouth daily at bedtime 90 tablet 3    rosuvastatin (CRESTOR) 10 MG tablet Take 1 tablet (10 mg total) by mouth daily at bedtime 90 tablet 3    valACYclovir (VALTREX) 500 mg tablet Take 500 mg by mouth 2 (two) times a day  0    Ventolin  (90 Base) MCG/ACT inhaler Inhale 2 puffs every 4 (four) hours as needed for wheezing or shortness of breath 3 Inhaler 3     No current facility-administered medications for this visit  No Known Allergies    Review of Systems   Respiratory: Positive for cough, chest tightness, shortness of breath and wheezing  All other systems reviewed and are negative  Video Exam    Vitals:    02/05/21 0957   Height: 6' (1 829 m)       Physical Exam  Constitutional:       General: He is not in acute distress  Appearance: Normal appearance   He is normal weight  He is not ill-appearing, toxic-appearing or diaphoretic  HENT:      Head: Normocephalic and atraumatic  Nose: Nose normal  No congestion  Mouth/Throat:      Mouth: Mucous membranes are moist       Pharynx: Oropharynx is clear  No oropharyngeal exudate or posterior oropharyngeal erythema  Eyes:      General: No scleral icterus  Right eye: No discharge  Left eye: No discharge  Extraocular Movements: Extraocular movements intact  Conjunctiva/sclera: Conjunctivae normal    Neck:      Musculoskeletal: Normal range of motion and neck supple  No neck rigidity  Pulmonary:      Effort: Pulmonary effort is normal  No respiratory distress  Skin:     Coloration: Skin is not jaundiced or pale  Findings: No erythema or rash  Neurological:      General: No focal deficit present  Mental Status: He is alert and oriented to person, place, and time  Psychiatric:         Mood and Affect: Mood normal          Behavior: Behavior normal          Thought Content: Thought content normal          Judgment: Judgment normal      Lab:  A  ALTERNATA 0 00 - 0 09 kUA/I 9  02High     A FUMIGATUS 0 00 - 0 09 kUA/I 0  86High     Berm[place] Grass 0 00 - 0 09 kUA/I 1  03High     Box Elder  0 00 - 0 09 kUA/I 1  05High     Cat Epithellium-Dander 0 00 - 0 09 kUA/I <0 10    C  HERBARUM 0 00 - 0 09 kUA/I 9  74High     Cockroach 0 00 - 0 09 kUA/I 1  11High     Common Silver Birch 0 00 - 0 09 kUA/I 0 68High     [place] 0 00 - 0 09 kUA/I 1  00High     D  farinae 0 00 - 0 09 kUA/I 1  59High     D  pteronyssinus 0 00 - 0 09 kUA/I 1  50High     Dog Dander 0 00 - 0 09 kUA/I 0  13High     Elm IgE 0 00 - 0 09 kUA/I 1  [address] Tree 0 00 - 0 09 kUA/I 1  49High     Mugwort 0 00 - 0 09 kUA/I 0  88High     Morton Tree 0 00 - 0 09 kUA/I 0 66High     Oak 0 00 - 0 09 kUA/I 1  05High     P CHRYSOGENUM 0 00 - 0 09 kUA/I 4  43High     Rough Pigweed  IgE 0 00 - 0 09 kUA/I 1  11High     Common Ragweed 0 00 - 0 09 kUA/I 0  93High     Sheep [place] IgE 0 00 - 0 09 kUA/I 1  11High     [place] Tree 0 00 - 0 09 kUA/I 1  07High     Dami Grass 0 00 - 0 09 kUA/I 1  12High     [place] Tree 0 00 - 0 09 kUA/I 1  04High     White Papa Tree 0 00 - 0 09 kUA/I 1  40High     IgE 0 - 113 kU/l 1,210High     Allergen Comment  See Below    MOUSE URINE 0 00 - 0 09 kUA/I <0 10          I spent 25 minutes with patient today in which greater than 50% of the time was spent in counseling/coordination of care regarding asthma      VIRTUAL VISIT DISCLAIMER    Marisela Cagle acknowledges that he has consented to an online visit or consultation  He understands that the online visit is based solely on information provided by him, and that, in the absence of a face-to-face physical evaluation by the physician, the diagnosis he receives is both limited and provisional in terms of accuracy and completeness  This is not intended to replace a full medical face-to-face evaluation by the physician  Marisela Cagle understands and accepts these terms

## 2021-02-05 NOTE — ASSESSMENT & PLAN NOTE
Hospitalist Progress Note    Patient: Nelva Buerger MRN: [id number]  CSN: [id number]    YOB: 1954  Age: 66 y.o.   Sex: male    DOA: 8/2/2018 LOS:  LOS: 3 days          Chief Complaint:    Sepsis due to UTI      Assessment/Plan     65 yo WM with Judy Wilkinson who lives at group home, presented with severe sepsis due to e coli UTI, and discovered also to have e coli bacteremia    UTI  Bacteremia  Possible RLL pneumonia versus infarct from PE  Judy Wilkinson with MR  Severe debility  alzheimers    Plan:CTA chest to rule out PE  Change IV abx to rocephin for UTI and bacteremia, and add clinda for now for poss asp pneumonia  If pos for PE will need AC therapy    D/c IVF  Dysphagia diet as ordered    Disposition :group home versus SNF  Patient Active Problem List   Diagnosis Code    Mental retardation F79    Down's syndrome Q90.9    Alzheimer's dementia G30.9, F02.80    HTN (hypertension) I10    HLD (hyperlipidemia) E78.5    GERD (gastroesophageal reflux disease) K21.9    Sepsis ([address] A41.9    Acute UTI N39.0    TRANG (acute kidney injury) ([facility] [address] N17.9       Subjective:    No new events reported    Review of systems:  nonverbal      Vital signs/Intake and Output:  Visit Vitals    /66 (BP 1 Location: Right arm, BP Patient Position: At rest)    Pulse 64    Temp 98.7 °F (37.1 °C)    Resp 20    Ht 5' 2\" (1.575 m)    Wt 85.8 kg (189 lb 3.2 oz)    SpO2 93%    BMI 34.61 kg/m2     Current Shift:     Last three shifts:  08/04 1901 - 08/06 0700  In: 1562.5 [P.O.:660; I.V.:902.5]  Out: 1075 [Urine:1075]    Exam:    General: debilitated WM, NAD, nonverbal  Head/Neck: NCAT, supple, No masses, No lymphadenopathy  CVS:Regular rate and rhythm, no M/R/G, S1/S2 heard, no thrill  Lungs:Clear to auscultation bilaterally, no wheezes, rhonchi, or rales  Abdomen: Soft, Nontender, No distention, Normal Bowel sounds, No hepatomegaly  Extremities: No C/C/E, pulses palpable 2+  Skin:normal texture and turgor, no rashes, no · His asthma still not well controlled  ·  his ACT score today is 17  ·  but he feels better than last visit  ·  he is adherent using his Breo every day and Singulair every night as well as nasal corticosteroid spray  ·  his allergy panel is not significant for any specific allergens except that he is IgE level is more than 1200  ·  at this point I think the patient would be a good candidate to start him on Xolair  ·  I would like to obtain new pulmonary function test with bronchodilator response  ·  varicella IgG antibodies level  ·  counseled the patient and he agrees with the plan he will need an in-person visit prior to ordering the Xolair lesions  Neuro:does not follow commands, generally weak                Labs: Results:       Chemistry Recent Labs      08/06/18   0505  08/05/18   0350  08/04/18   1200   GLU  97  92  134*   NA  142  143  143   K  3.5  4.0  4.3   CL  109*  113*  112*   CO2  28  26  24   BUN  9  7  10   CREA  0.95  1.07  1.06   CA  8.0*  7.5*  7.9*   AGAP  5  4  7   BUCR  9*  7*  9*   AP  90  74  78   TP  6.4  5.9*  6.1*   ALB  2.2*  2.1*  2.0*   GLOB  4.2*  3.8  4.1*   AGRAT  0.5*  0.6*  0.5*      CBC w/Diff Recent Labs      08/06/18   0505  08/05/18   0350  08/04/18   0105   WBC  6.4  7.3  11.1   RBC  3.55*  3.19*  3.53*   HGB  11.6*  10.5*  11.8*   HCT  35.9*  32.4*  36.2   PLT  148  130*  137      Cardiac Enzymes No results for input(s): CPK, CKND1, ALDAIR in the last 72 hours. No lab exists for component: CKRMB, TROIP   Coagulation Recent Labs      08/03/18   1520   PTP  15.9*   INR  1.3*   APTT  32.1       Lipid Panel Lab Results   Component Value Date/Time    Cholesterol, total 140 05/12/2016 09:59 AM    HDL Cholesterol 46 05/12/2016 09:59 AM    LDL, calculated 75.8 05/12/2016 09:59 AM    VLDL, calculated 18.2 05/12/2016 09:59 AM    Triglyceride 91 05/12/2016 09:59 AM    CHOL/HDL Ratio 3.0 05/12/2016 09:59 AM      BNP No results for input(s): BNPP in the last 72 hours.    Liver Enzymes Recent Labs      08/06/18   0505   TP  6.4   ALB  2.2*   AP  90   SGOT  23      Thyroid Studies Lab Results   Component Value Date/Time    TSH 3.20 08/03/2018 03:20 PM        Procedures/imaging: see electronic medical records for all procedures/Xrays and details which were not copied into this note but were reviewed prior to creation of [facility]

## 2021-02-05 NOTE — ASSESSMENT & PLAN NOTE
Impression: Cortical age-related cataract, left eye: H25.012 OS. .  Visually significant, quality of life issue, could improve with surgery. Plan: Cataracts account for the patient's complaints. Discussed all risks, benefits, alternatives, procedures and recovery. Patient understands changing glasses will not improve vision. Patient desires to have surgery, recommend phacoemulsification with intraocular lens implant OS.   lvl 2 - pt declines premium IOL ·  Does not currently use CPAP  ·  he  Did not tolerate in the past  ·  I would like to reestablish diagnosis and do in-lab diagnostic sleep study in view of his history of asthma  ·  based on the results of the sleep study we would rediscuss and re-evaluate if he needs to be back on CPAP with different types of masks versus other alternative therapies

## 2021-02-05 NOTE — TELEPHONE ENCOUNTER
Called patient to help schedule testing and follow up with Dr Mic Cardoza  Per patient he will call to schedule himself

## 2021-03-10 DIAGNOSIS — Z23 ENCOUNTER FOR IMMUNIZATION: ICD-10-CM

## 2021-03-17 ENCOUNTER — IMMUNIZATIONS (OUTPATIENT)
Dept: FAMILY MEDICINE CLINIC | Facility: HOSPITAL | Age: 61
End: 2021-03-17

## 2021-03-17 DIAGNOSIS — Z23 ENCOUNTER FOR IMMUNIZATION: Primary | ICD-10-CM

## 2021-03-17 PROCEDURE — 0011A SARS-COV-2 / COVID-19 MRNA VACCINE (MODERNA) 100 MCG: CPT

## 2021-03-17 PROCEDURE — 91301 SARS-COV-2 / COVID-19 MRNA VACCINE (MODERNA) 100 MCG: CPT

## 2021-04-16 ENCOUNTER — IMMUNIZATIONS (OUTPATIENT)
Dept: FAMILY MEDICINE CLINIC | Facility: HOSPITAL | Age: 61
End: 2021-04-16

## 2021-04-16 DIAGNOSIS — Z23 ENCOUNTER FOR IMMUNIZATION: Primary | ICD-10-CM

## 2021-04-16 PROCEDURE — 0012A SARS-COV-2 / COVID-19 MRNA VACCINE (MODERNA) 100 MCG: CPT

## 2021-04-16 PROCEDURE — 91301 SARS-COV-2 / COVID-19 MRNA VACCINE (MODERNA) 100 MCG: CPT

## 2021-07-01 ENCOUNTER — TELEMEDICINE (OUTPATIENT)
Dept: FAMILY MEDICINE CLINIC | Facility: CLINIC | Age: 61
End: 2021-07-01
Payer: MEDICARE

## 2021-07-01 DIAGNOSIS — B34.9 VIRAL INFECTION, UNSPECIFIED: Primary | ICD-10-CM

## 2021-07-01 PROCEDURE — U0003 INFECTIOUS AGENT DETECTION BY NUCLEIC ACID (DNA OR RNA); SEVERE ACUTE RESPIRATORY SYNDROME CORONAVIRUS 2 (SARS-COV-2) (CORONAVIRUS DISEASE [COVID-19]), AMPLIFIED PROBE TECHNIQUE, MAKING USE OF HIGH THROUGHPUT TECHNOLOGIES AS DESCRIBED BY CMS-2020-01-R: HCPCS | Performed by: FAMILY MEDICINE

## 2021-07-01 PROCEDURE — 99441 PR PHYS/QHP TELEPHONE EVALUATION 5-10 MIN: CPT | Performed by: FAMILY MEDICINE

## 2021-07-01 PROCEDURE — U0005 INFEC AGEN DETEC AMPLI PROBE: HCPCS | Performed by: FAMILY MEDICINE

## 2021-07-01 NOTE — PROGRESS NOTES
COVID-19 Outpatient Progress Note    Assessment/Plan:    Problem List Items Addressed This Visit     None      Visit Diagnoses     Viral infection, unspecified    -  Primary    Relevant Orders    Novel Coronavirus (Covid-19),PCR [facility] - Collected at [facility] or [facility]         Disposition:     I referred patient to one of our centralized sites for a COVID-19 swab  I have spent 5 minutes directly with the patient  Encounter provider Talia Mills MD    Provider located at [address] [place] [phone]    Recent Visits  No visits were found meeting these conditions  Showing recent visits within past 7 days and meeting all other requirements  Today's Visits  Date Type Provider Dept   07/01/21 Telemedicine Talia Mills  [facility] today's visits and meeting all other requirements  Future Appointments  No visits were found meeting these conditions  Showing future appointments within next 150 days and meeting all other requirements       Patient agrees to participate in a virtual check in via telephone or video visit instead of presenting to the office to address urgent/immediate medical needs  Patient is aware this is a billable service  After connecting through Telephone, the patient was identified by name and date of birth  Shukri Gao was informed that this was a telemedicine visit and that the exam was being conducted confidentially over secure lines  My office door was closed  No one else was in the room  Shukri Gao acknowledged consent and understanding of privacy and security of the telemedicine visit  I informed the patient that I have reviewed his record in Epic and presented the opportunity for him to ask any questions regarding the visit today  The patient agreed to participate      It was my intent to perform this visit via video technology but the patient was not able to do a video connection so the visit was completed via audio telephone only  Subjective:   Samreen oDll is a 64 y o  male who is concerned about COVID-19  Patient's symptoms include nasal congestion, rhinorrhea, sore throat, anosmia, loss of taste and cough  Patient denies fever, chills, fatigue, malaise, shortness of breath, chest tightness, abdominal pain, nausea, vomiting, diarrhea, myalgias and headaches       Date of symptom onset: 6/28/2021    Exposure:   Contact with a person who is under investigation (PUI) for or who is positive for COVID-19 within the last 14 days?: No    Hospitalized recently for fever and/or lower respiratory symptoms?: No      Currently a healthcare worker that is involved in direct patient care?: No      Works in a special setting where the risk of COVID-19 transmission may be high? (this may include long-term care, correctional and FDC facilities; homeless shelters; assisted-living facilities and group homes ): No      Resident in a special setting where the risk of COVID-19 transmission may be high? (this may include long-term care, correctional and FDC facilities; homeless shelters; assisted-living facilities and group homes ): No      Lab Results   Component Value Date    [facility] 98 Not Detected 07/07/2020     Past Medical History:   Diagnosis Date    Asthma     Hyperlipidemia     Nasal polyps     Near syncope     Sleep apnea, obstructive      Past Surgical History:   Procedure Laterality Date    ARTHROSCOPY KNEE      COLONOSCOPY      [place] SHLDR ARTHROSCOP,SURG,W/REMOVAL,LOOSE/FB Right 12/6/2018    Procedure: ARTHROSCOPIC SHOULDER;  Surgeon: Stacy Perry MD;  Location: [address] [place] [address];  Service: Orthopedics    [place] JEFFRYR ARTHROSCOP,SURG,W/ROTAT CUFF REPR Right 12/6/2018    Procedure: REPAIR ROTATOR CUFF  ARTHROSCOPIC WITH POSSIBLE REMOVAL LOOSE BODIES AND POSSIBLE DECOMPRESSION;  Surgeon: Stacy Perry MD;  Location: [address] [place] [address];  Service: Orthopedics  SINUS SURGERY       Current Outpatient Medications   Medication Sig Dispense Refill    esomeprazole (NexIUM) 20 mg capsule Take 1 capsule (20 mg total) by mouth 2 (two) times a day before meals 180 capsule 3    Fluticasone Propionate (Xhance) 93 MCG/ACT EXHU 1 spray into each nostril 2 (two) times a day 16 mL 5    fluticasone-vilanterol (BREO ELLIPTA) 200-25 MCG/INH inhaler Inhale 1 puff daily Rinse mouth after use  3 Inhaler 3    montelukast (Singulair) 10 mg tablet Take 1 tablet (10 mg total) by mouth every morning 90 tablet 3    Multiple Vitamin (MULTIVITAMIN) tablet Take 1 tablet by mouth daily      naproxen (EC NAPROSYN) 500 MG EC tablet Take 1 tablet (500 mg total) by mouth 2 (two) times a day with meals 60 tablet 0    rOPINIRole (REQUIP) 2 mg tablet Take 1 tablet (2 mg total) by mouth daily at bedtime 90 tablet 3    rosuvastatin (CRESTOR) 10 MG tablet Take 1 tablet (10 mg total) by mouth daily at bedtime 90 tablet 3    valACYclovir (VALTREX) 500 mg tablet Take 500 mg by mouth 2 (two) times a day  0    Ventolin  (90 Base) MCG/ACT inhaler Inhale 2 puffs every 4 (four) hours as needed for wheezing or shortness of breath 3 Inhaler 3     No current facility-administered medications for this visit  No Known Allergies    Review of Systems   Constitutional: Negative for chills, fatigue and fever  HENT: Positive for congestion, rhinorrhea and sore throat  Respiratory: Positive for cough  Negative for chest tightness and shortness of breath  Gastrointestinal: Negative for abdominal pain, diarrhea, nausea and vomiting  Musculoskeletal: Negative for myalgias  Neurological: Negative for headaches  Objective: There were no vitals filed for this visit  Physical Exam  VIRTUAL VISIT DISCLAIMER    Marielle Asifdios acknowledges that he has consented to an online visit or consultation   He understands that the online visit is based solely on information provided by him, and that, in the absence of a face-to-face physical evaluation by the physician, the diagnosis he receives is both limited and provisional in terms of accuracy and completeness  This is not intended to replace a full medical face-to-face evaluation by the physician  Claudean Berkeley understands and accepts these terms

## 2021-07-02 ENCOUNTER — TELEPHONE (OUTPATIENT)
Dept: FAMILY MEDICINE CLINIC | Facility: CLINIC | Age: 61
End: 2021-07-02

## 2021-07-02 NOTE — TELEPHONE ENCOUNTER
----- Message from Idania Tucker MD sent at 7/2/2021  3:00 PM EDT -----  Pl, advise pt - negative COVID test - Chantale Shaw was sent over

## 2021-11-27 ENCOUNTER — NURSE TRIAGE (OUTPATIENT)
Dept: OTHER | Facility: OTHER | Age: 61
End: 2021-11-27

## 2021-11-27 DIAGNOSIS — Z20.828 SARS-ASSOCIATED CORONAVIRUS EXPOSURE: Primary | ICD-10-CM

## 2021-11-28 PROCEDURE — U0003 INFECTIOUS AGENT DETECTION BY NUCLEIC ACID (DNA OR RNA); SEVERE ACUTE RESPIRATORY SYNDROME CORONAVIRUS 2 (SARS-COV-2) (CORONAVIRUS DISEASE [COVID-19]), AMPLIFIED PROBE TECHNIQUE, MAKING USE OF HIGH THROUGHPUT TECHNOLOGIES AS DESCRIBED BY CMS-2020-01-R: HCPCS | Performed by: FAMILY MEDICINE

## 2021-11-28 PROCEDURE — U0005 INFEC AGEN DETEC AMPLI PROBE: HCPCS | Performed by: FAMILY MEDICINE

## 2021-11-29 ENCOUNTER — TELEMEDICINE (OUTPATIENT)
Dept: FAMILY MEDICINE CLINIC | Facility: CLINIC | Age: 61
End: 2021-11-29
Payer: COMMERCIAL

## 2021-11-29 DIAGNOSIS — U07.1 COVID-19: Primary | ICD-10-CM

## 2021-11-29 PROCEDURE — 99441 PR PHYS/QHP TELEPHONE EVALUATION 5-10 MIN: CPT | Performed by: FAMILY MEDICINE

## 2021-12-10 ENCOUNTER — APPOINTMENT (OUTPATIENT)
Dept: LAB | Facility: CLINIC | Age: 61
End: 2021-12-10
Payer: COMMERCIAL

## 2021-12-10 DIAGNOSIS — E78.00 HYPERCHOLESTEROLEMIA: ICD-10-CM

## 2021-12-10 LAB
ALBUMIN SERPL BCP-MCNC: 4.1 G/DL (ref 3.5–5)
ALP SERPL-CCNC: 82 U/L (ref 46–116)
ALT SERPL W P-5'-P-CCNC: 32 U/L (ref 12–78)
ANION GAP SERPL CALCULATED.3IONS-SCNC: 5 MMOL/L (ref 4–13)
AST SERPL W P-5'-P-CCNC: 21 U/L (ref 5–45)
BILIRUB SERPL-MCNC: 0.68 MG/DL (ref 0.2–1)
BUN SERPL-MCNC: 16 MG/DL (ref 5–25)
CALCIUM SERPL-MCNC: 9.5 MG/DL (ref 8.3–10.1)
CHLORIDE SERPL-SCNC: 108 MMOL/L (ref 100–108)
CHOLEST SERPL-MCNC: 175 MG/DL
CO2 SERPL-SCNC: 29 MMOL/L (ref 21–32)
CREAT SERPL-MCNC: 1.11 MG/DL (ref 0.6–1.3)
GFR SERPL CREATININE-BSD FRML MDRD: 72 ML/MIN/1.73SQ M
GLUCOSE P FAST SERPL-MCNC: 95 MG/DL (ref 65–99)
HDLC SERPL-MCNC: 37 MG/DL
LDLC SERPL CALC-MCNC: 117 MG/DL (ref 0–100)
NONHDLC SERPL-MCNC: 138 MG/DL
POTASSIUM SERPL-SCNC: 4.1 MMOL/L (ref 3.5–5.3)
PROT SERPL-MCNC: 7.4 G/DL (ref 6.4–8.2)
SODIUM SERPL-SCNC: 142 MMOL/L (ref 136–145)
TRIGL SERPL-MCNC: 106 MG/DL

## 2021-12-10 PROCEDURE — 80061 LIPID PANEL: CPT

## 2021-12-10 PROCEDURE — 36415 COLL VENOUS BLD VENIPUNCTURE: CPT

## 2021-12-10 PROCEDURE — 80053 COMPREHEN METABOLIC PANEL: CPT

## 2022-01-03 ENCOUNTER — OFFICE VISIT (OUTPATIENT)
Dept: FAMILY MEDICINE CLINIC | Facility: CLINIC | Age: 62
End: 2022-01-03
Payer: COMMERCIAL

## 2022-01-03 VITALS
BODY MASS INDEX: 28 KG/M2 | SYSTOLIC BLOOD PRESSURE: 130 MMHG | WEIGHT: 200 LBS | RESPIRATION RATE: 16 BRPM | TEMPERATURE: 98 F | DIASTOLIC BLOOD PRESSURE: 90 MMHG | HEIGHT: 71 IN | HEART RATE: 66 BPM

## 2022-01-03 DIAGNOSIS — Z00.00 MEDICARE ANNUAL WELLNESS VISIT, SUBSEQUENT: Primary | ICD-10-CM

## 2022-01-03 DIAGNOSIS — R03.0 ELEVATED BP WITHOUT DIAGNOSIS OF HYPERTENSION: ICD-10-CM

## 2022-01-03 DIAGNOSIS — G25.81 RESTLESS LEG SYNDROME: ICD-10-CM

## 2022-01-03 PROCEDURE — 3725F SCREEN DEPRESSION PERFORMED: CPT | Performed by: FAMILY MEDICINE

## 2022-01-03 PROCEDURE — G0439 PPPS, SUBSEQ VISIT: HCPCS | Performed by: FAMILY MEDICINE

## 2022-01-03 PROCEDURE — 3008F BODY MASS INDEX DOCD: CPT | Performed by: FAMILY MEDICINE

## 2022-01-03 RX ORDER — AMOXICILLIN 500 MG/1
CAPSULE ORAL
COMMUNITY
Start: 2021-12-26 | End: 2022-05-13

## 2022-01-03 NOTE — PROGRESS NOTES
Assessment and Plan:     Problem List Items Addressed This Visit        Other    Restless leg syndrome    Elevated BP without diagnosis of hypertension  Monitor BP at home -  Low Na diet       Other Visit Diagnoses     Medicare annual wellness visit, subsequent    -  Primary         declined flu vaccine     Preventive health issues were discussed with patient, and age appropriate screening tests were ordered as noted in patient's After Visit Summary  Personalized health advice and appropriate referrals for health education or preventive services given if needed, as noted in patient's After Visit Summary       History of Present Illness:     Patient presents for Medicare Annual Wellness visit    Patient Care Team:  Servando Bartholomew MD as PCP - General (Family Medicine)     Problem List:     Patient Active Problem List   Diagnosis    Hypercholesterolemia    Asthma    Benign prostatic hyperplasia without urinary obstruction    Testicular hypogonadism    Restless leg syndrome    TAJ (obstructive sleep apnea)    Complete tear of right rotator cuff    Elevated BP without diagnosis of hypertension      Past Medical and Surgical History:     Past Medical History:   Diagnosis Date    Asthma     Hyperlipidemia     Nasal polyps     Near syncope     Sleep apnea, obstructive      Past Surgical History:   Procedure Laterality Date    ARTHROSCOPY KNEE      COLONOSCOPY      NJ SHLDR ARTHROSCOP,SURG,W/REMOVAL,LOOSE/FB Right 12/6/2018    Procedure: ARTHROSCOPIC SHOULDER;  Surgeon: Chase Gomez MD;  Location: [facility] OR;  Service: Orthopedics    NJ SHLDR ARTHROSCOP,SURG,W/ROTAT CUFF REPR Right 12/6/2018    Procedure: REPAIR ROTATOR CUFF  ARTHROSCOPIC WITH POSSIBLE REMOVAL LOOSE BODIES AND POSSIBLE DECOMPRESSION;  Surgeon: Chase Gomez MD;  Location: [facility];  Service: Orthopedics    SINUS SURGERY        Family History:     Family History   Problem Relation Age of Onset    Dementia Mother    Isabel Carrillo Kidney disease Mother     Dementia Father     Rheumatologic disease Father     Other Father         rheumatism    No Known Problems Sister     No Known Problems Brother     No Known Problems Maternal Aunt     No Known Problems Maternal Uncle     No Known Problems Paternal Aunt     No Known Problems Paternal Uncle     No Known Problems Maternal Grandmother     No Known Problems Maternal Grandfather     No Known Problems Paternal Grandmother     No Known Problems Paternal Grandfather     ADD / ADHD Neg Hx     Anesthesia problems Neg Hx     Cancer Neg Hx     Clotting disorder Neg Hx     Collagen disease Neg Hx     Diabetes Neg Hx     Dislocations Neg Hx     Learning disabilities Neg Hx     Neurological problems Neg Hx     Osteoporosis Neg Hx     Scoliosis Neg Hx     Vascular Disease Neg Hx       Social History:     Social History     Socioeconomic History    Marital status: /Civil Union     Spouse name: Not on file    Number of children: Not on file    Years of education: Not on file    Highest education level: Not on file   Occupational History    Not on file   Tobacco Use    Smoking status: Former Smoker     Packs/day: 0 50     Years: 15 00     Pack years: 7 50     Quit date:      Years since quittin 0    Smokeless tobacco: Never Used    Tobacco comment: smoked 3-4 days    Vaping Use    Vaping Use: Never used   Substance and Sexual Activity    Alcohol use: No    Drug use: No    Sexual activity: Not on file   Other Topics Concern    Not on file   Social History Narrative    Not on file     Social Determinants of Health     Financial Resource Strain: Not on file   Food Insecurity: Not on file   Transportation Needs: Not on file   Physical Activity: Not on file   Stress: Not on file   Social Connections: Not on file   Intimate Partner Violence: Not on file   Housing Stability: Not on file      Medications and Allergies:     Current Outpatient Medications Medication Sig Dispense Refill    amoxicillin (AMOXIL) 500 mg capsule take 2 capsules by mouth immediately then 1 capsule three times a day until finished      esomeprazole (NexIUM) 20 mg capsule Take 1 capsule (20 mg total) by mouth 2 (two) times a day before meals 180 capsule 3    Fluticasone Propionate (Xhance) 93 MCG/ACT EXHU 1 spray into each nostril 2 (two) times a day 16 mL 5    montelukast (Singulair) 10 mg tablet Take 1 tablet (10 mg total) by mouth every morning 90 tablet 3    Multiple Vitamin (MULTIVITAMIN) tablet Take 1 tablet by mouth daily      rOPINIRole (REQUIP) 2 mg tablet Take 1 tablet (2 mg total) by mouth daily at bedtime 90 tablet 3    rosuvastatin (CRESTOR) 10 MG tablet Take 1 tablet (10 mg total) by mouth daily at bedtime 90 tablet 3    Ventolin  (90 Base) MCG/ACT inhaler Inhale 2 puffs every 4 (four) hours as needed for wheezing or shortness of breath 3 Inhaler 3    fluticasone-vilanterol (BREO ELLIPTA) 200-25 MCG/INH inhaler Inhale 1 puff daily Rinse mouth after use  3 Inhaler 3     No current facility-administered medications for this visit       No Known Allergies   Immunizations:     Immunization History   Administered Date(s) Administered    COVID-19 MODERNA VACC 0 5 ML IM 03/17/2021, 04/16/2021, 11/21/2021    Influenza Quadrivalent Preservative Free Pediatric IM 10/01/2016    Influenza, injectable, quadrivalent, preservative free 0 5 mL 11/05/2018    Influenza, seasonal, injectable 11/06/2017      Health Maintenance:         Topic Date Due    Hepatitis C Screening  Never done    HIV Screening  Never done    Colorectal Cancer Screening  06/16/2026         Topic Date Due    Pneumococcal Vaccine: Pediatrics (0 to 5 Years) and At-Risk Patients (6 to 59 Years) (1 of 2 - PPSV23) Never done    DTaP,Tdap,and Td Vaccines (1 - Tdap) Never done    Influenza Vaccine (1) 09/01/2021      Medicare Health Risk Assessment:     /90   Pulse 66   Temp 98 °F (36 7 °C) Resp 16   Ht 5' 10 75" (1 797 m)   Wt 90 7 kg (200 lb)   BMI 28 09 kg/m²      Mela Miller is here for his Subsequent Wellness visit  Health Risk Assessment:   Patient rates overall health as good  Patient feels that their physical health rating is slightly worse  Patient is satisfied with their life  Eyesight was rated as slightly worse  Hearing was rated as slightly worse  Patient feels that their emotional and mental health rating is slightly worse  Patients states they are sometimes angry  Patient states they are often unusually tired/fatigued  Pain experienced in the last 7 days has been some  Patient's pain rating has been 4/10  Patient states that he has experienced weight loss or gain in last 6 months  Fall Risk Screening: In the past year, patient has experienced: no history of falling in past year      Home Safety:  Patient does not have trouble with stairs inside or outside of their home  Patient has working smoke alarms and has working carbon monoxide detector  Home safety hazards include: none  Nutrition:   Current diet is Regular  Medications:   Patient is currently taking over-the-counter supplements  OTC medications include: Multi vitamin  Patient is able to manage medications  Activities of Daily Living (ADLs)/Instrumental Activities of Daily Living (IADLs):   Walk and transfer into and out of bed and chair?: Yes  Dress and groom yourself?: Yes    Bathe or shower yourself?: Yes    Feed yourself?  Yes  Do your laundry/housekeeping?: Yes  Manage your money, pay your bills and track your expenses?: Yes  Make your own meals?: Yes    Do your own shopping?: Yes    Previous Hospitalizations:   Any hospitalizations or ED visits within the last 12 months?: No      Advance Care Planning:   Living will: No    Durable POA for healthcare: No    Advanced directive: No      PREVENTIVE SCREENINGS      Cardiovascular Screening:    General: Screening Not Indicated, History Lipid Disorder and Screening Current      Diabetes Screening:     General: Screening Current      Colorectal Cancer Screening:     General: Screening Current      Prostate Cancer Screening:    General: Screening Not Indicated      Osteoporosis Screening:    General: Screening Not Indicated      Abdominal Aortic Aneurysm (AAA) Screening:    Risk factors include: tobacco use        Lung Cancer Screening:     General: Screening Not Indicated      Hepatitis C Screening:    General: Screening Not Indicated    Screening, Brief Intervention, and Referral to Treatment (SBIRT)    Screening  Typical number of drinks in a day: 0  Typical number of drinks in a week: 0  Interpretation: Low risk drinking behavior      AUDIT-C Screenin) How often did you have a drink containing alcohol in the past year? monthly or less  2) How many drinks did you have on a typical day when you were drinking in the past year? 0  3) How often did you have 6 or more drinks on one occasion in the past year? never    AUDIT-C Score: 1  Interpretation: Score 0-3 (male): Negative screen for alcohol misuse    Single Item Drug Screening:  How often have you used an illegal drug (including marijuana) or a prescription medication for non-medical reasons in the past year? never    Single Item Drug Screen Score: 0  Interpretation: Negative screen for possible drug use disorder      Sundeep Bunn MD

## 2022-01-03 NOTE — PATIENT INSTRUCTIONS
Medicare Preventive Visit Patient Instructions  Thank you for completing your Welcome to Medicare Visit or Medicare Annual Wellness Visit today  Your next wellness visit will be due in one year (1/4/2023)  The screening/preventive services that you may require over the next 5-10 years are detailed below  Some tests may not apply to you based off risk factors and/or age  Screening tests ordered at today's visit but not completed yet may show as past due  Also, please note that scanned in results may not display below  Preventive Screenings:  Service Recommendations Previous Testing/Comments   Colorectal Cancer Screening  · Colonoscopy    · Fecal Occult Blood Test (FOBT)/Fecal Immunochemical Test (FIT)  · Fecal DNA/Cologuard Test  · Flexible Sigmoidoscopy Age: 54-65 years old   Colonoscopy: every 10 years (May be performed more frequently if at higher risk)  OR  FOBT/FIT: every 1 year  OR  Cologuard: every 3 years  OR  Sigmoidoscopy: every 5 years  Screening may be recommended earlier than age 48 if at higher risk for colorectal cancer  Also, an individualized decision between you and your healthcare provider will decide whether screening between the ages of 74-80 would be appropriate   Colonoscopy: 06/16/2016  FOBT/FIT: Not on file  Cologuard: Not on file  Sigmoidoscopy: Not on file          Prostate Cancer Screening Individualized decision between patient and health care provider in men between ages of 53-78   Medicare will cover every 12 months beginning on the day after your 50th birthday PSA: No results in last 5 years           Hepatitis C Screening Once for adults born between 80 and 1965  More frequently in patients at high risk for Hepatitis C Hep C Antibody: Not on file        Diabetes Screening 1-2 times per year if you're at risk for diabetes or have pre-diabetes Fasting glucose: 95 mg/dL   A1C: No results in last 5 years        Cholesterol Screening Once every 5 years if you don't have a lipid disorder  May order more often based on risk factors  Lipid panel: 12/10/2021           Other Preventive Screenings Covered by Medicare:  1  Abdominal Aortic Aneurysm (AAA) Screening: covered once if your at risk  You're considered to be at risk if you have a family history of AAA or a male between the age of 73-68 who smoking at least 100 cigarettes in your lifetime  2  Lung Cancer Screening: covers low dose CT scan once per year if you meet all of the following conditions: (1) Age 50-69; (2) No signs or symptoms of lung cancer; (3) Current smoker or have quit smoking within the last 15 years; (4) You have a tobacco smoking history of at least 30 pack years (packs per day x number of years you smoked); (5) You get a written order from a healthcare provider  3  Glaucoma Screening: covered annually if you're considered high risk: (1) You have diabetes OR (2) Family history of glaucoma OR (3)  aged 48 and older OR (3)  American aged 72 and older  3  Osteoporosis Screening: covered every 2 years if you meet one of the following conditions: (1) Have a vertebral abnormality; (2) On glucocorticoid therapy for more than 3 months; (3) Have primary hyperparathyroidism; (4) On osteoporosis medications and need to assess response to drug therapy  5  HIV Screening: covered annually if you're between the age of 12-76  Also covered annually if you are younger than 13 and older than 72 with risk factors for HIV infection  For pregnant patients, it is covered up to 3 times per pregnancy      Immunizations:  Immunization Recommendations   Influenza Vaccine Annual influenza vaccination during flu season is recommended for all persons aged >= 6 months who do not have contraindications   Pneumococcal Vaccine (Prevnar and Pneumovax)  * Prevnar = PCV13  * Pneumovax = PPSV23 Adults 25-60 years old: 1-3 doses may be recommended based on certain risk factors  Adults 72 years old: Prevnar (PCV13) vaccine recommended followed by Pneumovax (PPSV23) vaccine  If already received PPSV23 since turning 65, then PCV13 recommended at least one year after PPSV23 dose  Hepatitis B Vaccine 3 dose series if at intermediate or high risk (ex: diabetes, end stage renal disease, liver disease)   Tetanus (Td) Vaccine - COST NOT COVERED BY MEDICARE PART B Following completion of primary series, a booster dose should be given every 10 years to maintain immunity against tetanus  Td may also be given as tetanus wound prophylaxis  Tdap Vaccine - COST NOT COVERED BY MEDICARE PART B Recommended at least once for all adults  For pregnant patients, recommended with each pregnancy  Shingles Vaccine (Shingrix) - COST NOT COVERED BY MEDICARE PART B  2 shot series recommended in those aged 48 and above     Health Maintenance Due:      Topic Date Due    Hepatitis C Screening  Never done    HIV Screening  Never done    Colorectal Cancer Screening  06/16/2026     Immunizations Due:      Topic Date Due    Pneumococcal Vaccine: Pediatrics (0 to 5 Years) and At-Risk Patients (6 to 59 Years) (1 of 2 - PPSV23) Never done    DTaP,Tdap,and Td Vaccines (1 - Tdap) Never done    Influenza Vaccine (1) 09/01/2021     Advance Directives   What are advance directives? Advance directives are legal documents that state your wishes and plans for medical care  These plans are made ahead of time in case you lose your ability to make decisions for yourself  Advance directives can apply to any medical decision, such as the treatments you want, and if you want to donate organs  What are the types of advance directives? There are many types of advance directives, and each state has rules about how to use them  You may choose a combination of any of the following:  · Living will: This is a written record of the treatment you want  You can also choose which treatments you do not want, which to limit, and which to stop at a certain time   This includes surgery, medicine, IV fluid, and tube feedings  · Durable power of  for healthcare [facility]): This is a written record that states who you want to make healthcare choices for you when you are unable to make them for yourself  This person, called a proxy, is usually a family member or a friend  You may choose more than 1 proxy  · Do not resuscitate (DNR) order:  A DNR order is used in case your heart stops beating or you stop breathing  It is a request not to have certain forms of treatment, such as CPR  A DNR order may be included in other types of advance directives  · Medical directive: This covers the care that you want if you are in a coma, near death, or unable to make decisions for yourself  You can list the treatments you want for each condition  Treatment may include pain medicine, surgery, blood transfusions, dialysis, IV or tube feedings, and a ventilator (breathing machine)  · Values history: This document has questions about your views, beliefs, and how you feel and think about life  This information can help others choose the care that you would choose  Why are advance directives important? An advance directive helps you control your care  Although spoken wishes may be used, it is better to have your wishes written down  Spoken wishes can be misunderstood, or not followed  Treatments may be given even if you do not want them  An advance directive may make it easier for your family to make difficult choices about your care  © Copyright [employer] 2018 Information is for End User's use only and may not be sold, redistributed or otherwise used for commercial purposes   All illustrations and images included in CareNotes® are the copyrighted property of [employer]  or [address] [place], [place] [address]eYeka

## 2022-01-24 DIAGNOSIS — E78.00 HYPERCHOLESTEROLEMIA: ICD-10-CM

## 2022-01-24 RX ORDER — ROSUVASTATIN CALCIUM 10 MG/1
TABLET, COATED ORAL
Qty: 90 TABLET | Refills: 3 | Status: SHIPPED | OUTPATIENT
Start: 2022-01-24

## 2022-02-26 DIAGNOSIS — J45.909 UNCOMPLICATED ASTHMA, UNSPECIFIED ASTHMA SEVERITY, UNSPECIFIED WHETHER PERSISTENT: ICD-10-CM

## 2022-05-13 ENCOUNTER — OFFICE VISIT (OUTPATIENT)
Dept: URGENT CARE | Facility: CLINIC | Age: 62
End: 2022-05-13
Payer: COMMERCIAL

## 2022-05-13 VITALS
HEIGHT: 72 IN | TEMPERATURE: 98.4 F | WEIGHT: 198.2 LBS | DIASTOLIC BLOOD PRESSURE: 90 MMHG | OXYGEN SATURATION: 98 % | HEART RATE: 75 BPM | SYSTOLIC BLOOD PRESSURE: 120 MMHG | RESPIRATION RATE: 20 BRPM | BODY MASS INDEX: 26.84 KG/M2

## 2022-05-13 DIAGNOSIS — J06.9 ACUTE URI: Primary | ICD-10-CM

## 2022-05-13 DIAGNOSIS — R05.9 COUGH: ICD-10-CM

## 2022-05-13 PROCEDURE — 1036F TOBACCO NON-USER: CPT | Performed by: FAMILY MEDICINE

## 2022-05-13 PROCEDURE — 3008F BODY MASS INDEX DOCD: CPT | Performed by: FAMILY MEDICINE

## 2022-05-13 PROCEDURE — 99213 OFFICE O/P EST LOW 20 MIN: CPT | Performed by: FAMILY MEDICINE

## 2022-05-13 RX ORDER — BENZONATATE 150 MG/1
150 CAPSULE ORAL 3 TIMES DAILY PRN
Qty: 20 CAPSULE | Refills: 0 | Status: SHIPPED | OUTPATIENT
Start: 2022-05-13

## 2022-05-13 RX ORDER — CEFDINIR 300 MG/1
300 CAPSULE ORAL EVERY 12 HOURS SCHEDULED
Qty: 20 CAPSULE | Refills: 0 | Status: SHIPPED | OUTPATIENT
Start: 2022-05-13 | End: 2022-05-13 | Stop reason: ALTCHOICE

## 2022-05-13 NOTE — PROGRESS NOTES
Assessment/Plan:      Diagnoses and all orders for this visit:    Acute URI    Cough  -     benzonatate (TESSALON) 150 MG CAPS; Take 1 capsule (150 mg total) by mouth as needed in the morning and 1 capsule (150 mg total) as needed at noon and 1 capsule (150 mg total) as needed in the evening for cough  Other orders  -     Discontinue: cefdinir (OMNICEF) 300 mg capsule; Take 1 capsule (300 mg total) by mouth every 12 (twelve) hours for 10 days          Resolving URI per hx and exam   Persistent cough, nonproductive  Tessalon pearls for symptomatic treatment  Advised to f/u with PCP in 3-5 days  Subjective:     Patient ID: Hair Pace is a 63 y o  male  URI   This is a new problem  The current episode started in the past 7 days  There has been no fever  Associated symptoms include coughing  Pertinent negatives include no abdominal pain, chest pain, diarrhea, dysuria, headaches, nausea, sore throat or vomiting  Treatments tried:  amoxicillin, prednisone  Fever of 102 5F two days ago  Review of Systems   Constitutional: Negative for chills and fever  HENT: Negative for sore throat  Respiratory: Positive for cough  Negative for shortness of breath  Cardiovascular: Negative for chest pain and palpitations  Gastrointestinal: Negative for abdominal pain, constipation, diarrhea, nausea and vomiting  Genitourinary: Negative for difficulty urinating and dysuria  Neurological: Negative for dizziness and headaches  Objective:  Vitals:    22 1540   BP: 120/90   Pulse: 75   Resp: 20   Temp: 98 4 °F (36 9 °C)   SpO2: 98%          Physical Exam  Constitutional:       Appearance: Normal appearance  HENT:      Head: Normocephalic and atraumatic  Mouth/Throat:      Pharynx: Posterior oropharyngeal erythema present  Cardiovascular:      Rate and Rhythm: Normal rate and regular rhythm  Heart sounds: Normal heart sounds  No murmur heard    Pulmonary:      Breath sounds: Normal breath sounds  No wheezing  Abdominal:      Palpations: Abdomen is soft  Tenderness: There is no abdominal tenderness  Musculoskeletal:      Right lower leg: No edema  Left lower leg: No edema  Neurological:      Mental Status: He is alert

## 2022-05-13 NOTE — PROGRESS NOTES
Assessment/Plan:      Diagnoses and all orders for this visit:    Acute suppurative otitis media of left ear without spontaneous rupture of tympanic membrane, recurrence not specified  -     cefdinir (OMNICEF) 300 mg capsule; Take 1 capsule (300 mg total) by mouth every 12 (twelve) hours for 10 days          Acute otitis media per hx and findings on exam   Given amoxicillin allergy, will treat with Cefdinir instead  Advise to f/u with PCP in 3-5 days  Subjective:     Patient ID: Yisel Diehl is a 63 y o  male  Earache   There is pain in the left ear  This is a new problem  The current episode started yesterday  There has been no fever  The pain is at a severity of 2/10  Pertinent negatives include no abdominal pain, coughing, diarrhea, ear discharge, headaches, rhinorrhea, sore throat or vomiting  His past medical history is significant for hearing loss  Review of Systems   Constitutional: Negative for chills, fatigue and fever  HENT: Positive for ear pain  Negative for ear discharge, rhinorrhea and sore throat  Respiratory: Negative for cough  Gastrointestinal: Negative for abdominal pain, diarrhea and vomiting  Genitourinary: Negative for difficulty urinating and dysuria  Neurological: Negative for headaches  Objective:       Physical Exam  Constitutional:       Appearance: Normal appearance  HENT:      Head: Normocephalic and atraumatic  Right Ear: Tympanic membrane, ear canal and external ear normal  No tenderness  Tympanic membrane is not perforated or erythematous  Left Ear: External ear normal  Tenderness present  Tympanic membrane is erythematous  Tympanic membrane is not perforated  Cardiovascular:      Rate and Rhythm: Normal rate and regular rhythm  Heart sounds: Normal heart sounds  No murmur heard  Pulmonary:      Breath sounds: Normal breath sounds  No wheezing  Abdominal:      Palpations: Abdomen is soft  Tenderness:  There is no abdominal tenderness  Musculoskeletal:      Right lower leg: No edema  Left lower leg: No edema  Neurological:      Mental Status: He is alert

## 2022-08-29 ENCOUNTER — TELEPHONE (OUTPATIENT)
Dept: PULMONOLOGY | Facility: CLINIC | Age: 62
End: 2022-08-29

## 2022-08-29 NOTE — TELEPHONE ENCOUNTER
----- Message from Panchito Smallwood sent at 7/25/2022  9:35 AM EDT -----    ----- Message -----  From: Panchito Smallwood  Sent: 7/25/2022  12:00 AM EDT  To: Panchito Smallwood      ----- Message -----  From: Panchito Smallwood  Sent: 7/12/2022   1:41 PM EDT  To: Panchito Smallwood    Call pt at [phone] to schedule appt w/ Dr Aviva Gibson in [place]

## 2022-08-29 NOTE — TELEPHONE ENCOUNTER
I called and left Mr Kevin Naranjo a message regarding an appointment with Dr Nancy Craig at our [facility] office  I made him an appointment, first available which will be 10/25/22  Confirm with patient to see if he is able to make this appointment, if not please assist in scheduling to one that is more convenient for him  Thank you

## 2022-10-25 ENCOUNTER — OFFICE VISIT (OUTPATIENT)
Dept: PULMONOLOGY | Facility: MEDICAL CENTER | Age: 62
End: 2022-10-25
Payer: COMMERCIAL

## 2022-10-25 VITALS
DIASTOLIC BLOOD PRESSURE: 90 MMHG | RESPIRATION RATE: 16 BRPM | SYSTOLIC BLOOD PRESSURE: 130 MMHG | TEMPERATURE: 98.7 F | BODY MASS INDEX: 28.58 KG/M2 | HEIGHT: 72 IN | HEART RATE: 59 BPM | WEIGHT: 211 LBS | OXYGEN SATURATION: 98 %

## 2022-10-25 DIAGNOSIS — G47.33 OSA (OBSTRUCTIVE SLEEP APNEA): ICD-10-CM

## 2022-10-25 DIAGNOSIS — J45.40 MODERATE PERSISTENT ASTHMA, UNCOMPLICATED: ICD-10-CM

## 2022-10-25 DIAGNOSIS — R06.83 SNORING: ICD-10-CM

## 2022-10-25 DIAGNOSIS — G25.81 RESTLESS LEG SYNDROME: ICD-10-CM

## 2022-10-25 DIAGNOSIS — G47.19 EXCESSIVE DAYTIME SLEEPINESS: ICD-10-CM

## 2022-10-25 DIAGNOSIS — J45.909 UNCOMPLICATED ASTHMA, UNSPECIFIED ASTHMA SEVERITY, UNSPECIFIED WHETHER PERSISTENT: Primary | ICD-10-CM

## 2022-10-25 PROCEDURE — 90677 PCV20 VACCINE IM: CPT

## 2022-10-25 PROCEDURE — G0009 ADMIN PNEUMOCOCCAL VACCINE: HCPCS

## 2022-10-25 PROCEDURE — 99215 OFFICE O/P EST HI 40 MIN: CPT | Performed by: INTERNAL MEDICINE

## 2022-10-25 NOTE — ASSESSMENT & PLAN NOTE
Not using CPAP, ordered in-lab study for him he did not get it done he wants to prior size is asthma management  I explained to the patient in details the pathophysiology of obstructive sleep apnea  I also explained the importance of treatment, and the consequences of untreated obstructive sleep apnea on underlying cardiovascular and cerebrovascular risk, morbidity, and mortality

## 2022-10-25 NOTE — ASSESSMENT & PLAN NOTE
· Uncontrolled bronchial asthma, not using long-acting beta agonist or inhaled corticosteroid is only  on rescue inhaler  · Counseled extensively for the importance of restarting Breo, refilled with pharmacy  · Markedly elevated IgE in the past, moderately elevated eos  · Getting workup by ENT to bed started on Dupixent for nasal polyposis  · Ordered PFTs with bronchodilator response  · He may need a sleep study

## 2022-10-25 NOTE — PROGRESS NOTES
Pulmonary/Sleep Follow Up Note   Karuna Magallon 63 y o  male MRN: [id number]  10/25/2022      Assessment and Plan:    1  Uncomplicated asthma, unspecified asthma severity, unspecified whether persistent  Assessment & Plan:  · Uncontrolled bronchial asthma, not using long-acting beta agonist or inhaled corticosteroid is only  on rescue inhaler  · Counseled extensively for the importance of restarting Breo, refilled with pharmacy  · Markedly elevated IgE in the past, moderately elevated eos  · Getting workup by ENT to bed started on Dupixent for nasal polyposis  · Ordered PFTs with bronchodilator response  · He may need a sleep study    Orders:  -     Complete PFT with post bronchodilator; Future    2  Moderate persistent asthma, uncomplicated   -     fluticasone-vilanterol (BREO ELLIPTA) 200-25 MCG/INH inhaler; Inhale 1 puff daily Rinse mouth after use  -     Complete PFT with post bronchodilator; Future  -     Pneumococcal Conjugate Vaccine 20-valent (Pcv20)    3  Restless leg syndrome  Assessment & Plan:  Managed by PCP is currently on Requip      4  TAJ (obstructive sleep apnea)  Assessment & Plan:  Not using CPAP, ordered in-lab study for him he did not get it done he wants to prior size is asthma management  I explained to the patient in details the pathophysiology of obstructive sleep apnea  I also explained the importance of treatment, and the consequences of untreated obstructive sleep apnea on underlying cardiovascular and cerebrovascular risk, morbidity, and mortality  5  Snoring  Assessment & Plan:   due to untreated TAJ      6  Excessive daytime sleepiness  Assessment & Plan:  Due to untreated TAJ          Return in about 3 months (around 1/25/2023)      History of Present Illness   HPI:  Karuna Magallon is a 63 y o  male who is here for follow-up appointment was last seen in February 2021 on a telemedicine visit for uncontrolled asthma was recommended the patient continues to use inhaled corticosteroid long-acting inhalers however he has been been using that consistently only using rescue inhaler  Also recommended to get an in-lab sleep study to re-establish a diagnosis of obstructive sleep apnea after weight loss however he did not get that done  The patient is back here today for follow-up appointment he did not using his long-acting inhaler only using rescue inhaler as needed, he still has persistent snoring and excessive daytime sleepiness, wheezing and dyspnea on exertion his Imlay Scale is 14/24 has recurrent nasal congestion nasal polyposis, chest tightness and uncontrolled urge to move his legs        Review of Systems      Genitourinary need to urinate more than twice a night   Cardiology none   Gastrointestinal none   Neurology frequent headaches, awaken with headache, need to move extremities, forgetfulness, poor concentration or confusion,  and difficulty with memory   Constitutional fatigue   Integumentary none   Psychiatry anxiety, depression, aggressiveness or irritability and mood change   Musculoskeletal joint pain, muscle aches, back pain and legs twitching/jerking   Pulmonary shortness of breath with activity and wheezing   ENT throat clearing   Endocrine frequent urination   Hematological none           Historical Information   Past Medical History:   Diagnosis Date   • Asthma    • Hyperlipidemia    • Nasal polyps    • Near syncope    • Sleep apnea, obstructive      Past Surgical History:   Procedure Laterality Date   • ARTHROSCOPY KNEE     • COLONOSCOPY     • [facility] [facility] ARTHROSCOP,SURG,W/REMOVAL,LOOSE/FB Right 12/6/2018    Procedure: ARTHROSCOPIC SHOULDER;  Surgeon: Zhang Arndt MD;  Location: [address] [place] [address];  Service: Orthopedics   • [facility] [facility] ARTHROSCOP,SURG,W/ROTAT CUFF REPR Right 12/6/2018    Procedure: REPAIR ROTATOR CUFF  ARTHROSCOPIC WITH POSSIBLE REMOVAL LOOSE BODIES AND POSSIBLE DECOMPRESSION;  Surgeon: Zhang Arndt MD;  Location: [address] [place] [address];  Service: Orthopedics   • SINUS SURGERY       Family History   Problem Relation Age of Onset   • Dementia Mother    • Kidney disease Mother    • Dementia Father    • Rheumatologic disease Father    • Other Father         rheumatism   • No Known Problems Sister    • No Known Problems Brother    • No Known Problems Maternal Aunt    • Vision loss Maternal Uncle    • No Known Problems Paternal Aunt    • No Known Problems Paternal Uncle    • No Known Problems Maternal Grandmother    • No Known Problems Maternal Grandfather    • No Known Problems Paternal Grandmother    • No Known Problems Paternal Grandfather    • ADD / ADHD Neg Hx    • Anesthesia problems Neg Hx    • Cancer Neg Hx    • Clotting disorder Neg Hx    • Collagen disease Neg Hx    • Diabetes Neg Hx    • Dislocations Neg Hx    • Learning disabilities Neg Hx    • Neurological problems Neg Hx    • Osteoporosis Neg Hx    • Scoliosis Neg Hx    • Vascular Disease Neg Hx          Meds/Allergies     Current Outpatient Medications:   •  esomeprazole (NexIUM) 20 mg capsule, Take 1 capsule (20 mg total) by mouth 2 (two) times a day before meals, Disp: 180 capsule, Rfl: 3  •  Fluticasone Propionate (Xhance) 93 MCG/ACT EXHU, 1 spray into each nostril 2 (two) times a day, Disp: 16 mL, Rfl: 5  •  fluticasone-vilanterol (BREO ELLIPTA) 200-25 MCG/INH inhaler, Inhale 1 puff daily Rinse mouth after use , Disp: 3 each, Rfl: 3  •  montelukast (SINGULAIR) 10 mg tablet, take 1 tablet by mouth every morning, Disp: 90 tablet, Rfl: 3  •  Multiple Vitamin (MULTIVITAMIN) tablet, Take 1 tablet by mouth daily, Disp: , Rfl:   •  rOPINIRole (REQUIP) 2 mg tablet, Take 1 tablet (2 mg total) by mouth daily at bedtime, Disp: 90 tablet, Rfl: 3  •  rosuvastatin (CRESTOR) 10 MG tablet, take 1 tablet by mouth at bedtime, Disp: 90 tablet, Rfl: 3  •  Ventolin  (90 Base) MCG/ACT inhaler, inhale 2 puffs by mouth and INTO THE LUNGS every 4 hours IF NEEDED FOR WHEEZING OR SHORTNESS OF BREATH, Disp: 54 g, Rfl: 6  •  benzonatate (TESSALON) 150 MG CAPS, Take 1 capsule (150 mg total) by mouth as needed in the morning and 1 capsule (150 mg total) as needed at noon and 1 capsule (150 mg total) as needed in the evening for cough  , Disp: 20 capsule, Rfl: 0  No Known Allergies    Vitals: Blood pressure 130/90, pulse 59, temperature 98 7 °F (37 1 °C), temperature source Temporal, resp  rate 16, height 6' (1 829 m), weight 95 7 kg (211 lb), SpO2 98 %  Body mass index is 28 62 kg/m²  Oxygen Therapy  SpO2: 98 %      Physical Exam  General:  Awake alert and oriented x 3, conversant without conversational dyspnea, NAD, normal affect  HEENT:   Sclera noninjected, nonicteric OU, Nares patent,  no craniofacial abnormalities, Mucous membranes, moist, no oral lesions, normal dentition  NECK:  Trachea midline, no accessory muscle use, no stridor,  JVP not elevated  CARDIAC: Reg, single s1/S2, no m/r/g  PULM: CTA bilaterally no wheezing, rhonchi or rales  ABD: Soft nontender, nondistended, no rebound, no rigidity, no guarding  EXT: No cyanosis, no clubbing, no edema, normal capillary refill  NEURO: no focal neurologic deficits, AAOx3, moving all extremities appropriately    Labs: I have personally reviewed pertinent lab results  , ABG: No results found for: PHART, YHP9BEM, PO2ART, BIU4HYC, C2UDISIC, BEART, SOURCE, BNP: No results found for: BNP, CBC: No results found for: WBC, HGB, HCT, MCV, PLT, ADJUSTEDWBC, MCH, MCHC, RDW, MPV, NRBC, CMP: No results found for: SODIUM, K, CL, CO2, ANIONGAP, BUN, CREATININE, GLUCOSE, CALCIUM, AST, ALT, ALKPHOS, PROT, BILITOT, EGFR, PT/INR: No results found for: PT, INR, Troponin: No results found for: TROPONINI  Lab Results   Component Value Date    WBC 6 78 01/12/2021    HGB 14 9 01/12/2021    HCT 46 5 01/12/2021    MCV 92 01/12/2021     01/12/2021     Lab Results   Component Value Date    CALCIUM 9 5 12/10/2021    K 4 1 12/10/2021    CO2 29 12/10/2021     12/10/2021    BUN 16 12/10/2021 CREATININE 1 11 12/10/2021     Lab Results   Component Value Date    IGE 1,210 (H) 01/12/2021     Lab Results   Component Value Date    ALT 32 12/10/2021    AST 21 12/10/2021    ALKPHOS 82 12/10/2021     Lab:  A  ALTERNATA 0 00 - 0 09 kUA/I 9  02High     A FUMIGATUS 0 00 - 0 09 kUA/I 0  86High     [place] Grass 0 00 - 0 09 kUA/I 1  03High     Box Elder  0 00 - 0 09 kUA/I 1  05High     Cat Epithellium-Dander 0 00 - 0 09 kUA/I <0 10    C  HERBARUM 0 00 - 0 09 kUA/I 9  74High     Cockroach 0 00 - 0 09 kUA/I 1  11High     Common Silver Birch 0 00 - 0 09 kUA/I 0 68High     Lynn 0 00 - 0 09 kUA/I 1  00High     D  farinae 0 00 - 0 09 kUA/I 1  59High     D  pteronyssinus 0 00 - 0 09 kUA/I 1  50High     Dog Dander 0 00 - 0 09 kUA/I 0  13High     Elm IgE 0 00 - 0 09 kUA/I 1  [address] Tree 0 00 - 0 09 kUA/I 1  49High     Mugwort 0 00 - 0 09 kUA/I 0  88High     [place] Tree 0 00 - 0 09 kUA/I 0 66High     Oak 0 00 - 0 09 kUA/I 1  05High     P CHRYSOGENUM 0 00 - 0 09 kUA/I 4  43High     Rough Pigweed  IgE 0 00 - 0 09 kUA/I 1  11High     Common Ragweed 0 00 - 0 09 kUA/I 0  93High     Sheep Asher IgE 0 00 - 0 09 kUA/I 1  11High     [place] Tree 0 00 - 0 09 kUA/I 1  07High     Dami Grass 0 00 - 0 09 kUA/I 1  12High     [place] Tree 0 00 - 0 09 kUA/I 1  04High     White [place] Tree 0 00 - 0 09 kUA/I 1  40High     IgE 0 - 113 kU/l 1,210High     Allergen Comment   See Below    MOUSE URINE 0 00 - 0 09 kUA/I <0 10          Imaging and other studies: I have personally reviewed pertinent reports  CT chest 11/2018  IMPRESSION:     No CT evidence of interstitial lung disease      Biapical pleural parenchymal scarring with tiny right middle lobe calcified granuloma          Pulmonary function testing:       Sleep studies: I have personally reviewed pertinent reports  PSG 2005:   AHI 14 7 events/hr --> mild TAJ          Oneyda Syed MD  [facility]       Portions of the record may have been created with voice recognition software  Occasional wrong word or "sound a like" substitutions may have occurred due to the inherent limitations of voice recognition software  Read the chart carefully and recognize, using context, where substitutions have occurred

## 2022-10-25 NOTE — PATIENT INSTRUCTIONS
Pulmonary Function Tests   WHAT YOU NEED TO KNOW:   What do I need to know about pulmonary function tests? Pulmonary function tests (PFTs) measure how well your lungs work  PFTs may show the cause of breathing problems or how well treatments for a lung condition are working  How do I prepare for PFTs? Your healthcare provider will talk to you about how to prepare for these tests  You may need to stop taking certain breathing medicines from several hours to 24 hours before your tests  He will tell you what other medicines to take or not take on the day of your test  If you use oxygen, you may need to stop using it for a short time before you have these tests  You may be told not to smoke or do intense exercise on the day of your tests  What will happen during PFTs? Spirometry  measures how much air you breathe in and breathe out, and how fast you can blow air out  During this test, you will sit or stand next to a machine  A healthcare provider will place soft clips on your nose  He will tell you how to breathe into the machine  You will be asked to breathe calmly several times, draw the deepest breath you can, and then exhale forcefully for about 6 seconds  You will then be asked to breathe in deeply once more  Lung diffusion capacity  measures how well your lungs can move oxygen into your bloodstream  For this test, you will breathe in a gas through a tube  You will be asked to hold your breath for about 10 seconds and then exhale  Body plethysmography  measures the amount of air in your lungs after you take a deep breath  It also measures how much air stays in your lungs after you exhale  You will sit inside of a sealed box about the size of a phone [place]  You will be asked to breathe into a tube that is connected to a computer  The healthcare provider will tell you how to breathe into this tube  What are the risks of PFTs?   Deep breathing done during some tests may cause you to feel short of breath, dizzy, or lightheaded  It may also cause you to cough  CARE AGREEMENT:   You have the right to help plan your care  Learn about your health condition and how it may be treated  Discuss treatment options with your healthcare providers to decide what care you want to receive  You always have the right to refuse treatment  The above information is an  only  It is not intended as medical advice for individual conditions or treatments  Talk to your doctor, nurse or pharmacist before following any medical regimen to see if it is safe and effective for you  © Copyright [employer] 2022 Information is for End User's use only and may not be sold, redistributed or otherwise used for commercial purposes   All illustrations and images included in CareNotes® are the copyrighted property of A D A M , Inc  or [address] [place] [address]

## 2022-11-15 DIAGNOSIS — J45.909 UNCOMPLICATED ASTHMA, UNSPECIFIED ASTHMA SEVERITY, UNSPECIFIED WHETHER PERSISTENT: ICD-10-CM

## 2022-11-15 NOTE — TELEPHONE ENCOUNTER
Patient's wife calling requesting a refill of ventolin, she needs the script to be written very specifically for their insurance, she needs it to say "4 inhalers per 90 days" and she needs it sent to [facility]  She is also requesting a call once done   Please advise

## 2022-11-16 DIAGNOSIS — J45.909 UNCOMPLICATED ASTHMA, UNSPECIFIED ASTHMA SEVERITY, UNSPECIFIED WHETHER PERSISTENT: ICD-10-CM

## 2022-11-16 NOTE — TELEPHONE ENCOUNTER
Patient wife left a VM stating we still did not send in 4 inhalers for 90 days to [facility]  I called [facility] to clarify what we need to send as 4 inhalers is not a 90 day supply  The pharmacist said to send a script for 4 inhalers  I called and spoke to Sophie Moreland and she said we would need to add the 3 refills as well so the script is for a 90 day supply  She states it needs to be written that way for her insurance  Please advise

## 2022-12-20 ENCOUNTER — HOSPITAL ENCOUNTER (OUTPATIENT)
Dept: PULMONOLOGY | Facility: HOSPITAL | Age: 62
Discharge: HOME | End: 2022-12-20
Attending: INTERNAL MEDICINE

## 2022-12-20 ENCOUNTER — HOSPITAL ENCOUNTER (OUTPATIENT)
Dept: RADIOLOGY | Facility: HOSPITAL | Age: 62
Discharge: HOME | End: 2022-12-20
Attending: INTERNAL MEDICINE

## 2022-12-20 DIAGNOSIS — J33.9 NASAL POLYPOSIS: ICD-10-CM

## 2022-12-20 DIAGNOSIS — J32.1 CHRONIC FRONTAL SINUSITIS: ICD-10-CM

## 2022-12-20 DIAGNOSIS — J45.40 MODERATE PERSISTENT ASTHMA, UNCOMPLICATED: ICD-10-CM

## 2022-12-20 DIAGNOSIS — J32.2 CHRONIC ETHMOIDAL SINUSITIS: ICD-10-CM

## 2022-12-20 DIAGNOSIS — J32.0 CHRONIC MAXILLARY SINUSITIS: ICD-10-CM

## 2022-12-20 DIAGNOSIS — J32.3 CHRONIC SPHENOIDAL SINUSITIS: ICD-10-CM

## 2022-12-20 DIAGNOSIS — J45.909 UNCOMPLICATED ASTHMA, UNSPECIFIED ASTHMA SEVERITY, UNSPECIFIED WHETHER PERSISTENT: ICD-10-CM

## 2022-12-20 RX ORDER — ALBUTEROL SULFATE 2.5 MG/3ML
2.5 SOLUTION RESPIRATORY (INHALATION) ONCE
Status: COMPLETED | OUTPATIENT
Start: 2022-12-20 | End: 2022-12-20

## 2022-12-20 RX ADMIN — ALBUTEROL SULFATE 2.5 MG: 2.5 SOLUTION RESPIRATORY (INHALATION) at 15:31

## 2022-12-30 ENCOUNTER — TELEPHONE (OUTPATIENT)
Dept: PULMONOLOGY | Facility: MEDICAL CENTER | Age: 62
End: 2022-12-30

## 2022-12-30 NOTE — TELEPHONE ENCOUNTER
Spoke with patient attempted to schedule 3m f/u, patient stated  he would call office back to schedule

## 2023-01-18 ENCOUNTER — OFFICE VISIT (OUTPATIENT)
Dept: FAMILY MEDICINE CLINIC | Facility: CLINIC | Age: 63
End: 2023-01-18

## 2023-01-18 ENCOUNTER — APPOINTMENT (OUTPATIENT)
Dept: LAB | Facility: CLINIC | Age: 63
End: 2023-01-18

## 2023-01-18 VITALS
TEMPERATURE: 97.7 F | DIASTOLIC BLOOD PRESSURE: 82 MMHG | HEART RATE: 60 BPM | RESPIRATION RATE: 14 BRPM | BODY MASS INDEX: 29.01 KG/M2 | WEIGHT: 214.2 LBS | SYSTOLIC BLOOD PRESSURE: 128 MMHG | HEIGHT: 72 IN

## 2023-01-18 DIAGNOSIS — G25.81 RESTLESS LEG SYNDROME: ICD-10-CM

## 2023-01-18 DIAGNOSIS — E78.00 HYPERCHOLESTEROLEMIA: ICD-10-CM

## 2023-01-18 DIAGNOSIS — Z00.00 MEDICARE ANNUAL WELLNESS VISIT, SUBSEQUENT: ICD-10-CM

## 2023-01-18 DIAGNOSIS — E78.00 HYPERCHOLESTEROLEMIA: Primary | ICD-10-CM

## 2023-01-18 DIAGNOSIS — N40.0 BENIGN PROSTATIC HYPERPLASIA WITHOUT URINARY OBSTRUCTION: ICD-10-CM

## 2023-01-18 LAB
ALBUMIN SERPL BCP-MCNC: 3.8 G/DL (ref 3.5–5)
ALP SERPL-CCNC: 75 U/L (ref 46–116)
ALT SERPL W P-5'-P-CCNC: 28 U/L (ref 12–78)
ANION GAP SERPL CALCULATED.3IONS-SCNC: 3 MMOL/L (ref 4–13)
AST SERPL W P-5'-P-CCNC: 19 U/L (ref 5–45)
BILIRUB SERPL-MCNC: 0.67 MG/DL (ref 0.2–1)
BUN SERPL-MCNC: 22 MG/DL (ref 5–25)
CALCIUM SERPL-MCNC: 8.9 MG/DL (ref 8.3–10.1)
CHLORIDE SERPL-SCNC: 109 MMOL/L (ref 96–108)
CHOLEST SERPL-MCNC: 164 MG/DL
CO2 SERPL-SCNC: 29 MMOL/L (ref 21–32)
CREAT SERPL-MCNC: 1.12 MG/DL (ref 0.6–1.3)
GFR SERPL CREATININE-BSD FRML MDRD: 71 ML/MIN/1.73SQ M
GLUCOSE P FAST SERPL-MCNC: 99 MG/DL (ref 65–99)
HDLC SERPL-MCNC: 43 MG/DL
LDLC SERPL CALC-MCNC: 96 MG/DL (ref 0–100)
NONHDLC SERPL-MCNC: 121 MG/DL
POTASSIUM SERPL-SCNC: 4.1 MMOL/L (ref 3.5–5.3)
PROT SERPL-MCNC: 6.8 G/DL (ref 6.4–8.4)
SODIUM SERPL-SCNC: 141 MMOL/L (ref 135–147)
TRIGL SERPL-MCNC: 126 MG/DL

## 2023-01-18 RX ORDER — TAMSULOSIN HYDROCHLORIDE 0.4 MG/1
0.4 CAPSULE ORAL
Qty: 30 CAPSULE | Refills: 2 | Status: SHIPPED | OUTPATIENT
Start: 2023-01-18

## 2023-01-18 RX ORDER — ROSUVASTATIN CALCIUM 10 MG/1
10 TABLET, COATED ORAL
Qty: 90 TABLET | Refills: 3 | Status: SHIPPED | OUTPATIENT
Start: 2023-01-18

## 2023-01-18 RX ORDER — ROPINIROLE 2 MG/1
2 TABLET, FILM COATED ORAL
Qty: 90 TABLET | Refills: 3 | Status: SHIPPED | OUTPATIENT
Start: 2023-01-18

## 2023-01-18 NOTE — PATIENT INSTRUCTIONS
Medicare Preventive Visit Patient Instructions  Thank you for completing your Welcome to Medicare Visit or Medicare Annual Wellness Visit today  Your next wellness visit will be due in one year (1/19/2024)  The screening/preventive services that you may require over the next 5-10 years are detailed below  Some tests may not apply to you based off risk factors and/or age  Screening tests ordered at today's visit but not completed yet may show as past due  Also, please note that scanned in results may not display below  Preventive Screenings:  Service Recommendations Previous Testing/Comments   Colorectal Cancer Screening  · Colonoscopy    · Fecal Occult Blood Test (FOBT)/Fecal Immunochemical Test (FIT)  · Fecal DNA/Cologuard Test  · Flexible Sigmoidoscopy Age: 39-70 years old   Colonoscopy: every 10 years (May be performed more frequently if at higher risk)  OR  FOBT/FIT: every 1 year  OR  Cologuard: every 3 years  OR  Sigmoidoscopy: every 5 years  Screening may be recommended earlier than age 39 if at higher risk for colorectal cancer  Also, an individualized decision between you and your healthcare provider will decide whether screening between the ages of 74-80 would be appropriate   Colonoscopy: 06/16/2016  FOBT/FIT: Not on file  Cologuard: Not on file  Sigmoidoscopy: Not on file          Prostate Cancer Screening Individualized decision between patient and health care provider in men between ages of 53-78   Medicare will cover every 12 months beginning on the day after your 50th birthday PSA: No results in last 5 years           Hepatitis C Screening Once for adults born between [birth year] and 1965  More frequently in patients at high risk for Hepatitis C Hep C Antibody: Not on file        Diabetes Screening 1-2 times per year if you're at risk for diabetes or have pre-diabetes Fasting glucose: 95 mg/dL (12/10/2021)  A1C: No results in last 5 years (No results in last 5 years)      Cholesterol Screening Once every 5 years if you don't have a lipid disorder  May order more often based on risk factors  Lipid panel: 12/10/2021         Other Preventive Screenings Covered by Medicare:  1  Abdominal Aortic Aneurysm (AAA) Screening: covered once if your at risk  You're considered to be at risk if you have a family history of AAA or a male between the age of 73-68 who smoking at least 100 cigarettes in your lifetime  2  Lung Cancer Screening: covers low dose CT scan once per year if you meet all of the following conditions: (1) Age 50-69; (2) No signs or symptoms of lung cancer; (3) Current smoker or have quit smoking within the last 15 years; (4) You have a tobacco smoking history of at least 20 pack years (packs per day x number of years you smoked); (5) You get a written order from a healthcare provider  3  Glaucoma Screening: covered annually if you're considered high risk: (1) You have diabetes OR (2) Family history of glaucoma OR (3)  aged 48 and older OR (3)  American aged 72 and older  3  Osteoporosis Screening: covered every 2 years if you meet one of the following conditions: (1) Have a vertebral abnormality; (2) On glucocorticoid therapy for more than 3 months; (3) Have primary hyperparathyroidism; (4) On osteoporosis medications and need to assess response to drug therapy  5  HIV Screening: covered annually if you're between the age of 12-76  Also covered annually if you are younger than 13 and older than 72 with risk factors for HIV infection  For pregnant patients, it is covered up to 3 times per pregnancy      Immunizations:  Immunization Recommendations   Influenza Vaccine Annual influenza vaccination during flu season is recommended for all persons aged >= 6 months who do not have contraindications   Pneumococcal Vaccine   * Pneumococcal conjugate vaccine = PCV13 (Prevnar 13), PCV15 (Vaxneuvance), PCV20 (Prevnar 20)  * Pneumococcal polysaccharide vaccine = PPSV23 (Pneumovax) Adults 19-64 years old: 1-3 doses may be recommended based on certain risk factors  Adults 72 years old: 1-2 doses may be recommended based off what pneumonia vaccine you previously received   Hepatitis B Vaccine 3 dose series if at intermediate or high risk (ex: diabetes, end stage renal disease, liver disease)   Tetanus (Td) Vaccine - COST NOT COVERED BY MEDICARE PART B Following completion of primary series, a booster dose should be given every 10 years to maintain immunity against tetanus  Td may also be given as tetanus wound prophylaxis  Tdap Vaccine - COST NOT COVERED BY MEDICARE PART B Recommended at least once for all adults  For pregnant patients, recommended with each pregnancy  Shingles Vaccine (Shingrix) - COST NOT COVERED BY MEDICARE PART B  2 shot series recommended in those aged 48 and above     Health Maintenance Due:      Topic Date Due   • Hepatitis C Screening  Never done   • HIV Screening  Never done   • Colorectal Cancer Screening  06/16/2026     Immunizations Due:      Topic Date Due   • COVID-19 Vaccine (4 - Booster for [facility] series) 01/16/2022     Advance Directives   What are advance directives? Advance directives are legal documents that state your wishes and plans for medical care  These plans are made ahead of time in case you lose your ability to make decisions for yourself  Advance directives can apply to any medical decision, such as the treatments you want, and if you want to donate organs  What are the types of advance directives? There are many types of advance directives, and each state has rules about how to use them  You may choose a combination of any of the following:  · Living will: This is a written record of the treatment you want  You can also choose which treatments you do not want, which to limit, and which to stop at a certain time  This includes surgery, medicine, IV fluid, and tube feedings  · Durable power of  for healthcare [facility]):   This is a written record that states who you want to make healthcare choices for you when you are unable to make them for yourself  This person, called a proxy, is usually a family member or a friend  You may choose more than 1 proxy  · Do not resuscitate (DNR) order:  A DNR order is used in case your heart stops beating or you stop breathing  It is a request not to have certain forms of treatment, such as CPR  A DNR order may be included in other types of advance directives  · Medical directive: This covers the care that you want if you are in a coma, near death, or unable to make decisions for yourself  You can list the treatments you want for each condition  Treatment may include pain medicine, surgery, blood transfusions, dialysis, IV or tube feedings, and a ventilator (breathing machine)  · Values history: This document has questions about your views, beliefs, and how you feel and think about life  This information can help others choose the care that you would choose  Why are advance directives important? An advance directive helps you control your care  Although spoken wishes may be used, it is better to have your wishes written down  Spoken wishes can be misunderstood, or not followed  Treatments may be given even if you do not want them  An advance directive may make it easier for your family to make difficult choices about your care  Weight Management   Why it is important to manage your weight:  Being overweight increases your risk of health conditions such as heart disease, high blood pressure, type 2 diabetes, and certain types of cancer  It can also increase your risk for osteoarthritis, sleep apnea, and other respiratory problems  Aim for a slow, steady weight loss  Even a small amount of weight loss can lower your risk of health problems  How to lose weight safely:  A safe and healthy way to lose weight is to eat fewer calories and get regular exercise   You can lose up about 1 pound a week by decreasing the number of calories you eat by 500 calories each day  Healthy meal plan for weight management:  A healthy meal plan includes a variety of foods, contains fewer calories, and helps you stay healthy  A healthy meal plan includes the following:  · Eat whole-grain foods more often  A healthy meal plan should contain fiber  Fiber is the part of grains, fruits, and vegetables that is not broken down by your body  Whole-grain foods are healthy and provide extra fiber in your diet  Some examples of whole-grain foods are whole-wheat breads and pastas, oatmeal, brown rice, and bulgur  · Eat a variety of vegetables every day  Include dark, leafy greens such as spinach, kale, jesus greens, and mustard greens  Eat yellow and orange vegetables such as carrots, sweet potatoes, and winter squash  · Eat a variety of fruits every day  Choose fresh or canned fruit (canned in its own juice or light syrup) instead of juice  Fruit juice has very little or no fiber  · Eat low-fat dairy foods  Drink fat-free (skim) milk or 1% milk  Eat fat-free yogurt and low-fat cottage cheese  Try low-fat cheeses such as mozzarella and other reduced-fat cheeses  · Choose meat and other protein foods that are low in fat  Choose beans or other legumes such as split peas or lentils  Choose fish, skinless poultry (chicken or turkey), or lean cuts of red meat (beef or pork)  Before you cook meat or poultry, cut off any visible fat  · Use less fat and oil  Try baking foods instead of frying them  Add less fat, such as margarine, sour cream, regular salad dressing and mayonnaise to foods  Eat fewer high-fat foods  Some examples of high-fat foods include french fries, doughnuts, ice cream, and cakes  · Eat fewer sweets  Limit foods and drinks that are high in sugar  This includes candy, cookies, regular soda, and sweetened drinks  Exercise:  Exercise at least 30 minutes per day on most days of the week   Some examples of exercise include walking, biking, dancing, and swimming  You can also fit in more physical activity by taking the stairs instead of the elevator or parking farther away from stores  Ask your healthcare provider about the best exercise plan for you  © Copyright [employer] 2018 Information is for End User's use only and may not be sold, redistributed or otherwise used for commercial purposes   All illustrations and images included in CareNotes® are the copyrighted property of A D A M , Inc  or [address] [place] [address]

## 2023-01-18 NOTE — PROGRESS NOTES
Assessment and Plan:     Problem List Items Addressed This Visit        Genitourinary    Benign prostatic hyperplasia without urinary obstruction    Relevant Medications    tamsulosin (FLOMAX) 0 4 mg    Other Relevant Orders    Ambulatory Referral to Urology       Other    Hypercholesterolemia - Primary    Relevant Medications    rosuvastatin (CRESTOR) 10 MG tablet    Other Relevant Orders    Lipid panel    Comprehensive metabolic panel    Restless leg syndrome    Relevant Medications    rOPINIRole (REQUIP) 2 mg tablet   Other Visit Diagnoses     Medicare annual wellness visit, subsequent        BMI 28 0-28 9,adult            BMI Counseling: Body mass index is 29 05 kg/m²  The BMI is above normal  Nutrition recommendations include decreasing portion sizes, encouraging healthy choices of fruits and vegetables, consuming healthier snacks, moderation in carbohydrate intake and increasing intake of lean protein  Exercise recommendations include exercising 3-5 times per week  Rationale for BMI follow-up plan is due to patient being overweight or obese  Preventive health issues were discussed with patient, and age appropriate screening tests were ordered as noted in patient's After Visit Summary  Personalized health advice and appropriate referrals for health education or preventive services given if needed, as noted in patient's After Visit Summary  History of Present Illness:     Patient presents for a Medicare Wellness Visit    Pt is seeing for f/u HLD, BPH, restless legs syndrome  -  Worsening BPH symptoms with weak stream and nocturia      Patient Care Team:  Norah Dukes MD as PCP - General (Family Medicine)     Review of Systems:     Review of Systems   Constitutional: Negative  Respiratory: Negative  Cardiovascular: Negative  Gastrointestinal: Negative  Genitourinary: Positive for difficulty urinating   Negative for decreased urine volume, dysuria, enuresis, flank pain, frequency, genital sores, hematuria, penile discharge, penile pain, penile swelling, scrotal swelling, testicular pain and urgency  + nocturia    Musculoskeletal: Negative  Skin: Negative  Neurological: Negative  Psychiatric/Behavioral: Negative           Problem List:     Patient Active Problem List   Diagnosis   • Hypercholesterolemia   • Asthma   • Benign prostatic hyperplasia without urinary obstruction   • Testicular hypogonadism   • Restless leg syndrome   • TAJ (obstructive sleep apnea)   • Complete tear of right rotator cuff   • Elevated BP without diagnosis of hypertension   • Moderate persistent asthma, uncomplicated    • Snoring   • Excessive daytime sleepiness      Past Medical and Surgical History:     Past Medical History:   Diagnosis Date   • Asthma    • Hyperlipidemia    • Nasal polyps    • Near syncope    • Sleep apnea, obstructive      Past Surgical History:   Procedure Laterality Date   • ARTHROSCOPY KNEE     • COLONOSCOPY     • IA SURGICAL ARTHROSCOPY SHOULDER REMOVAL LOOSE/FB Right 12/6/2018    Procedure: ARTHROSCOPIC SHOULDER;  Surgeon: Randy Mendoza MD;  Location: [facility];  Service: Orthopedics   • IA SURGICAL ARTHROSCOPY SHOULDER W/ROTATOR CUFF RPR Right 12/6/2018    Procedure: REPAIR ROTATOR CUFF  ARTHROSCOPIC WITH POSSIBLE REMOVAL LOOSE BODIES AND POSSIBLE DECOMPRESSION;  Surgeon: Randy Mendoza MD;  Location: [facility];  Service: Orthopedics   • SINUS SURGERY        Family History:     Family History   Problem Relation Age of Onset   • Dementia Mother    • Kidney disease Mother    • Dementia Father    • Rheumatologic disease Father    • Other Father         rheumatism   • No Known Problems Sister    • No Known Problems Brother    • No Known Problems Maternal Aunt    • Vision loss Maternal Uncle    • No Known Problems Paternal Aunt    • No Known Problems Paternal Uncle    • No Known Problems Maternal Grandmother    • No Known Problems Maternal Grandfather    • No Known Problems Paternal Grandmother    • No Known Problems Paternal Grandfather    • ADD / ADHD Neg Hx    • Anesthesia problems Neg Hx    • Cancer Neg Hx    • Clotting disorder Neg Hx    • Collagen disease Neg Hx    • Diabetes Neg Hx    • Dislocations Neg Hx    • Learning disabilities Neg Hx    • Neurological problems Neg Hx    • Osteoporosis Neg Hx    • Scoliosis Neg Hx    • Vascular Disease Neg Hx       Social History:     Social History     Socioeconomic History   • Marital status: /Civil Union     Spouse name: None   • Number of children: None   • Years of education: None   • Highest education level: None   Occupational History   • None   Tobacco Use   • Smoking status: Former     Packs/day: 0 50     Years: 15 00     Pack years: 7 50     Types: Cigarettes     Quit date:      Years since quittin 0   • Smokeless tobacco: Never   • Tobacco comments:     smoked 3-4 days    Vaping Use   • Vaping Use: Never used   Substance and Sexual Activity   • Alcohol use: No   • Drug use: No   • Sexual activity: None   Other Topics Concern   • None   Social History Narrative   • None     Social Determinants of Health     Financial Resource Strain: Low Risk    • Difficulty of Paying Living Expenses: Not hard at all   Food Insecurity: Not on file   Transportation Needs: No Transportation Needs   • Lack of Transportation (Medical): No   • Lack of Transportation (Non-Medical):  No   Physical Activity: Not on file   Stress: Not on file   Social Connections: Not on file   Intimate Partner Violence: Not on file   Housing Stability: Not on file      Medications and Allergies:     Current Outpatient Medications   Medication Sig Dispense Refill   • esomeprazole (NexIUM) 20 mg capsule Take 1 capsule (20 mg total) by mouth 2 (two) times a day before meals 180 capsule 3   • Fluticasone Propionate (Xhance) 93 MCG/ACT EXHU 1 spray into each nostril 2 (two) times a day 16 mL 5   • fluticasone-vilanterol (BREO ELLIPTA) 200-25 MCG/INH inhaler Inhale 1 puff daily Rinse mouth after use  3 each 3   • montelukast (SINGULAIR) 10 mg tablet Take 1 tablet (10 mg total) by mouth every morning 90 tablet 3   • Multiple Vitamin (MULTIVITAMIN) tablet Take 1 tablet by mouth daily     • rOPINIRole (REQUIP) 2 mg tablet Take 1 tablet (2 mg total) by mouth daily at bedtime 90 tablet 3   • rosuvastatin (CRESTOR) 10 MG tablet Take 1 tablet (10 mg total) by mouth daily at bedtime 90 tablet 3   • tamsulosin (FLOMAX) 0 4 mg Take 1 capsule (0 4 mg total) by mouth daily with dinner 30 capsule 2   • Ventolin  (90 Base) MCG/ACT inhaler Inhale 2 puffs every 4 (four) hours as needed for wheezing or shortness of breath FOUR Inahlers and 3 refills 72 g 3     No current facility-administered medications for this visit  No Known Allergies   Immunizations:     Immunization History   Administered Date(s) Administered   • COVID-19 MODERNA VACC 0 5 ML IM 03/17/2021, 04/16/2021, 11/21/2021   • Influenza Quadrivalent Preservative Free Pediatric IM 10/01/2016   • Influenza, injectable, quadrivalent, preservative free 0 5 mL 11/05/2018   • Influenza, seasonal, injectable 11/06/2017   • Pneumococcal Conjugate Vaccine 20-valent (Pcv20), Polysace 10/25/2022      Health Maintenance:         Topic Date Due   • Hepatitis C Screening  Never done   • HIV Screening  Never done   • Colorectal Cancer Screening  06/16/2026         Topic Date Due   • COVID-19 Vaccine (4 - Booster for [facility] series) 01/16/2022      Medicare Screening Tests and Risk Assessments:     Christy Harrison is here for his Subsequent Wellness visit  Health Risk Assessment:   Patient rates overall health as good  Patient feels that their physical health rating is slightly worse  Patient is satisfied with their life  Eyesight was rated as slightly worse  Hearing was rated as slightly worse  Patient feels that their emotional and mental health rating is slightly worse  Patients states they are sometimes angry   Patient states they are sometimes unusually tired/fatigued  Pain experienced in the last 7 days has been none  Patient states that he has experienced no weight loss or gain in last 6 months  Fall Risk Screening: In the past year, patient has experienced: no history of falling in past year      Home Safety:  Patient does not have trouble with stairs inside or outside of their home  Patient has working smoke alarms and has working carbon monoxide detector  Home safety hazards include: none  Nutrition:   Current diet is Regular  Medications:   Patient is currently taking over-the-counter supplements  OTC medications include: see medication list  Patient is able to manage medications  Activities of Daily Living (ADLs)/Instrumental Activities of Daily Living (IADLs):   Walk and transfer into and out of bed and chair?: Yes  Dress and groom yourself?: Yes    Bathe or shower yourself?: Yes    Feed yourself?  Yes  Do your laundry/housekeeping?: Yes  Manage your money, pay your bills and track your expenses?: Yes  Make your own meals?: Yes    Do your own shopping?: Yes    Previous Hospitalizations:   Any hospitalizations or ED visits within the last 12 months?: No      Advance Care Planning:   Living will: No    Durable POA for healthcare: No    Advanced directive: No      Cognitive Screening:   Provider or family/friend/caregiver concerned regarding cognition?: No    PREVENTIVE SCREENINGS      Cardiovascular Screening:    General: Screening Not Indicated and History Lipid Disorder    Due for: Lipid Panel      Diabetes Screening:       Due for: Blood Glucose      Colorectal Cancer Screening:     General: Screening Current      Prostate Cancer Screening:    General: Screening Not Indicated      Osteoporosis Screening:    General: Screening Not Indicated      Abdominal Aortic Aneurysm (AAA) Screening:    Risk factors include: tobacco use        General: Screening Not Indicated      Lung Cancer Screening:     General: Screening Not Indicated      Hepatitis C Screening:    General: Screening Not Indicated    Screening, Brief Intervention, and Referral to Treatment (SBIRT)    Screening  Typical number of drinks in a day: 0  Typical number of drinks in a week: 0  Interpretation: Low risk drinking behavior  Single Item Drug Screening:  How often have you used an illegal drug (including marijuana) or a prescription medication for non-medical reasons in the past year? never    Single Item Drug Screen Score: 0  Interpretation: Negative screen for possible drug use disorder    No results found  Physical Exam:     /82 (BP Location: Right arm, Patient Position: Sitting, Cuff Size: Large)   Pulse 60   Temp 97 7 °F (36 5 °C) (Temporal)   Resp 14   Ht 6' (1 829 m)   Wt 97 2 kg (214 lb 3 2 oz)   BMI 29 05 kg/m²     Physical Exam  Constitutional:       General: He is not in acute distress  Appearance: He is not ill-appearing  Cardiovascular:      Rate and Rhythm: Normal rate and regular rhythm  Heart sounds: No murmur heard  Pulmonary:      Effort: Pulmonary effort is normal  No respiratory distress  Breath sounds: No wheezing, rhonchi or rales  Musculoskeletal:      Right lower leg: No edema  Left lower leg: No edema  Neurological:      General: No focal deficit present  Mental Status: He is alert and oriented to person, place, and time  Psychiatric:         Mood and Affect: Mood normal          Thought Content:  Thought content normal          Judgment: Judgment normal           Hector Carney MD

## 2023-02-22 ENCOUNTER — TELEPHONE (OUTPATIENT)
Dept: UROLOGY | Facility: MEDICAL CENTER | Age: 63
End: 2023-02-22

## 2023-02-22 NOTE — TELEPHONE ENCOUNTER
Please Triage  New Patient    What is the reason for the patient’s appointment? Benign prostatic hyperplasia without urinary obstruction  What office location does the patient prefer? [facility] requested [place] [facility]   Imaging/Lab Results:    Do we accept the patient's insurance or is the patient Self-Pay? Insurance Provider: Miles Pastrana   Plan Type/Number:  Member ID#: Has the patient had any previous Urologist(s)? No     Have patient records been requested? If not are records showing in Epic:   In epic   Has the patient had any outside testing done? In epic     Does the patient have a personal history of cancer?  No

## 2023-02-24 ENCOUNTER — TELEPHONE (OUTPATIENT)
Dept: PULMONOLOGY | Facility: CLINIC | Age: 63
End: 2023-02-24

## 2023-02-24 ENCOUNTER — OFFICE VISIT (OUTPATIENT)
Dept: PULMONOLOGY | Facility: MEDICAL CENTER | Age: 63
End: 2023-02-24

## 2023-02-24 ENCOUNTER — HOSPITAL ENCOUNTER (OUTPATIENT)
Dept: RADIOLOGY | Facility: HOSPITAL | Age: 63
Discharge: HOME | End: 2023-02-24

## 2023-02-24 VITALS
HEIGHT: 72 IN | DIASTOLIC BLOOD PRESSURE: 70 MMHG | TEMPERATURE: 98.6 F | WEIGHT: 214 LBS | SYSTOLIC BLOOD PRESSURE: 118 MMHG | OXYGEN SATURATION: 97 % | RESPIRATION RATE: 12 BRPM | BODY MASS INDEX: 28.99 KG/M2 | HEART RATE: 64 BPM

## 2023-02-24 DIAGNOSIS — R05.9 COUGH, UNSPECIFIED TYPE: ICD-10-CM

## 2023-02-24 DIAGNOSIS — J45.50 SEVERE PERSISTENT ASTHMA, UNSPECIFIED WHETHER COMPLICATED: ICD-10-CM

## 2023-02-24 DIAGNOSIS — J45.50 SEVERE PERSISTENT ASTHMA, UNSPECIFIED WHETHER COMPLICATED: Primary | ICD-10-CM

## 2023-02-24 DIAGNOSIS — G47.33 OSA (OBSTRUCTIVE SLEEP APNEA): ICD-10-CM

## 2023-02-24 NOTE — ASSESSMENT & PLAN NOTE
In the lab study was ordered for the patient at his last visit with Dr Shavon Ly; however, he wanted to prioritize asthma management at that time  He is continuing to have evaluation for initiation of Dupixent and this can be reevaluated at his next visit

## 2023-02-24 NOTE — PROGRESS NOTES
Pulmonary Follow-Up Note   Ashwini Jacob 63 y o  male MRN: [id number]  2/24/2023      Assessment/Plan:    Problem List Items Addressed This Visit        Respiratory    Asthma - Primary     We discussed the results of his PFTs today  He feels that due to his worsening sinus symptoms over the last week, he has had somewhat worsening of his symptoms; however, prior to this illness, his asthma was fairly well controlled  He will continue with Breo and albuterol as needed  I did discuss that based upon the results of his PFTs, we could consider adding a LAMA  We discussed side effects of these medications and indications  At this time, he would like to hold off on adding another inhaler  He would like to evaluate what the [address] does for his symptoms overall  He will follow-up with Dr Emma Mejia in 3 months for further evaluation  He did ask about getting chest imaging today  We reviewed his tobacco use history and he does not qualify for lung screening CT of the chest   Given his wheeze in the right lower lobe, as well as some cough, I ordered a chest x-ray to evaluate for any abnormality  Relevant Orders    XR chest pa & lateral (Completed)    TAJ (obstructive sleep apnea)     In the lab study was ordered for the patient at his last visit with Dr Emma Mejia; however, he wanted to prioritize asthma management at that time  He is continuing to have evaluation for initiation of Dupixent and this can be reevaluated at his next visit  Other Visit Diagnoses     Cough, unspecified type        Relevant Orders    XR chest pa & lateral (Completed)          Education provided at this visit:   Smoking Cessation: Quit smoking more than 15 years ago, sometime after the year 2000  He cannot recall exactly  Lung Cancer Screening: Not applicable   Inhaler Use: Reiterated proper use and technique    Return in about 3 months (around 5/24/2023), or if symptoms worsen or fail to improve      All of the patient's questions were answered prior to leaving the office today  He will follow-up with Dr Zane Meraz in three months or sooner should the need arise  He is aware to call our office with any further questions or concerns  History of Present Illness   Reason for Visit: Follow-up  Chief Complaint: "I think I have a sinus infection "  HPI: Tarry Hashimoto is a 63 y o  male who presents to the office today for follow-up of asthma  He reports that he has been doing okay since he last saw Dr Zane Meraz up until the last week or so  He feels he developed a sinus infection or possibly viral illness  He has had some mild shortness of breath and increased use of his rescue inhaler regimen due to some wheezing  He does not have significant sinus drainage  He is following with ENT and the plan is to start [address] for nasal polyposis  He is awaiting approval to do so  He is currently using Breo 200, Singulair, and albuterol as needed  He has occasional chest tightness with his symptoms as well that is sometimes relieved with his albuterol  He also underwent PFTs since his last visit  Aside from the above, no other complaints  Of note, he is also seeing urology for BPH  Review of Systems   All other systems reviewed and are negative  A full 12-point review of systems was completed and is negative except for those outlined in the HPI      Historical Information   Past Medical History:   Diagnosis Date   • Asthma    • Hyperlipidemia    • Nasal polyps    • Near syncope    • Sleep apnea, obstructive      Past Surgical History:   Procedure Laterality Date   • ARTHROSCOPY KNEE     • COLONOSCOPY     • IN SURGICAL ARTHROSCOPY SHOULDER REMOVAL LOOSE/FB Right 12/6/2018    Procedure: ARTHROSCOPIC SHOULDER;  Surgeon: Sushma Tabares MD;  Location: [facility];  Service: Orthopedics   • IN SURGICAL ARTHROSCOPY SHOULDER W/ROTATOR CUFF RPR Right 12/6/2018    Procedure: REPAIR ROTATOR CUFF  ARTHROSCOPIC WITH POSSIBLE REMOVAL LOOSE BODIES AND POSSIBLE DECOMPRESSION;  Surgeon: Hair Hardy MD;  Location: [facility];  Service: Orthopedics   • SINUS SURGERY       Family History   Problem Relation Age of Onset   • Dementia Mother    • Kidney disease Mother    • Dementia Father    • Rheumatologic disease Father    • Other Father         rheumatism   • No Known Problems Sister    • No Known Problems Brother    • No Known Problems Maternal Aunt    • Vision loss Maternal Uncle    • No Known Problems Paternal Aunt    • No Known Problems Paternal Uncle    • No Known Problems Maternal Grandmother    • No Known Problems Maternal Grandfather    • No Known Problems Paternal Grandmother    • No Known Problems Paternal Grandfather    • ADD / ADHD Neg Hx    • Anesthesia problems Neg Hx    • Cancer Neg Hx    • Clotting disorder Neg Hx    • Collagen disease Neg Hx    • Diabetes Neg Hx    • Dislocations Neg Hx    • Learning disabilities Neg Hx    • Neurological problems Neg Hx    • Osteoporosis Neg Hx    • Scoliosis Neg Hx    • Vascular Disease Neg Hx      Social History   Social History     Substance and Sexual Activity   Alcohol Use No     Social History     Substance and Sexual Activity   Drug Use No     Social History     Tobacco Use   Smoking Status Former   • Packs/day: 0 50   • Years: 15 00   • Pack years: 7 50   • Types: Cigarettes   • Quit date:    • Years since quittin    Smokeless Tobacco Never   Tobacco Comments    smoked 3-4 days      E-Cigarette/Vaping   • E-Cigarette Use Never User      E-Cigarette/Vaping Substances   • Nicotine No    • THC No    • CBD No    • Flavoring No    • Other No    • Unknown No        Meds/Allergies     Current Outpatient Medications:   •  esomeprazole (NexIUM) 20 mg capsule, Take 1 capsule (20 mg total) by mouth 2 (two) times a day before meals, Disp: 180 capsule, Rfl: 3  •  Fluticasone Propionate (Xhance) 93 MCG/ACT EXHU, 1 spray into each nostril 2 (two) times a day, Disp: 16 mL, Rfl: 5  • fluticasone-vilanterol (BREO ELLIPTA) 200-25 MCG/INH inhaler, Inhale 1 puff daily Rinse mouth after use , Disp: 3 each, Rfl: 3  •  montelukast (SINGULAIR) 10 mg tablet, Take 1 tablet (10 mg total) by mouth every morning, Disp: 90 tablet, Rfl: 3  •  Multiple Vitamin (MULTIVITAMIN) tablet, Take 1 tablet by mouth daily, Disp: , Rfl:   •  rOPINIRole (REQUIP) 2 mg tablet, Take 1 tablet (2 mg total) by mouth daily at bedtime, Disp: 90 tablet, Rfl: 3  •  rosuvastatin (CRESTOR) 10 MG tablet, Take 1 tablet (10 mg total) by mouth daily at bedtime, Disp: 90 tablet, Rfl: 3  •  tamsulosin (FLOMAX) 0 4 mg, Take 1 capsule (0 4 mg total) by mouth daily with dinner, Disp: 30 capsule, Rfl: 2  •  Ventolin  (90 Base) MCG/ACT inhaler, Inhale 2 puffs every 4 (four) hours as needed for wheezing or shortness of breath FOUR Inahlers and 3 refills, Disp: 72 g, Rfl: 3  No Known Allergies    Vitals: Blood pressure 118/70, pulse 64, temperature 98 6 °F (37 °C), temperature source Temporal, resp  rate 12, height 6' (1 829 m), weight 97 1 kg (214 lb), SpO2 97 %  Body mass index is 29 02 kg/m²  Oxygen Therapy  SpO2: 97 %    Physical Exam:  Physical Exam  Vitals reviewed  Constitutional:       General: He is not in acute distress  Appearance: He is well-developed  He is not toxic-appearing or diaphoretic  HENT:      Head: Normocephalic and atraumatic  Eyes:      General: No scleral icterus  Neck:      Trachea: No tracheal deviation  Cardiovascular:      Rate and Rhythm: Normal rate and regular rhythm  Heart sounds: S1 normal and S2 normal  No murmur heard  No friction rub  No gallop  Pulmonary:      Effort: Pulmonary effort is normal  No tachypnea, accessory muscle usage or respiratory distress  Breath sounds: No stridor  Wheezing present  No decreased breath sounds, rhonchi or rales  Comments: Wheeze right lower lobe  Chest:      Chest wall: No tenderness     Abdominal:      General: Bowel sounds are normal  There is no distension  Palpations: Abdomen is soft  Tenderness: There is no abdominal tenderness  Musculoskeletal:         General: No tenderness  Cervical back: Neck supple  Right lower leg: No edema  Left lower leg: No edema  Skin:     General: Skin is warm and dry  Findings: No rash  Neurological:      Mental Status: He is alert and oriented to person, place, and time  GCS: GCS eye subscore is 4  GCS verbal subscore is 5  GCS motor subscore is 6  Psychiatric:         Speech: Speech normal          Behavior: Behavior normal  Behavior is cooperative  Pulmonary Results (PFTs, PSG): I have personally reviewed pertinent reports  PFTs from December 20, 2022 show FEV1/FVC ratio of 62% with FEV1 of 2 86 L or 74% predicted and FVC of 4 61 L or 92% predicted  There was a significant bronchodilator response  Total lung capacity was 125% predicted and residual volume of 177% predicted  Diffusing capacity was 72% predicted  Consistent with mild airflow obstruction  REGGIE Oliver  [facility]        Portions of the record may have been created with voice recognition software  Occasional wrong word or "sound a like" substitutions may have occurred due to the inherent limitations of voice recognition software  Read the chart carefully and recognize, using context, where substitutions have occurred or contact the dictating provider

## 2023-02-24 NOTE — TELEPHONE ENCOUNTER
----- Message from REGGIE Camarena sent at 2/24/2023  3:02 PM EST -----  Please let the patient know that his chest x-ray was normal

## 2023-03-01 NOTE — PROGRESS NOTES
Daily Note     Today's date: 2019  Patient name: Katerin Jackson  : 1960  MRN: [id number]  Referring provider: Jacob Dunn MD  Dx:   Encounter Diagnosis     ICD-10-CM    1  Complete tear of right rotator cuff M75 121               Subjective: Pt reports pain began to increase yesterday afternoon located upper arm/biceps and into forearm  Objective: See treatment diary below    Visit #: 4    TherEx: 15'  Seated flexion table slides 5" 2x10  Pendulums AP and ML 1'x2 each  5 way (flex, ext, abd, IR, ER)  shoulder ISO w/ ball 5"x10 each  Sup PROM ER in neutral w/ cane 10"x10  Sup PROM flexion w/ opp UE 2x10    Manual: 10'  Grade 1-2 GHJ mobs/oscillation   PROM/manual stretching as per protocol     CP 10' to end     Assessment: Tolerated treatment well and w/ no c/o t/o session  Intro submax iso's as per protocol w/o issue  Pt seems to be progressing slightly slower than protocol secondary to only being seen for 4 sessions including IE on 18  Patient would benefit from continued PT  Plan: Continue per plan of care  Progress treament per protocol  Notified Ty Felipe, at Dr. Jovi Palacios office, that we have not been able to reach pt, and that pt recently had her gallbladder removed in January.

## 2023-03-14 NOTE — PROGRESS NOTES
Referring Physician: Ani Garcia MD  A copy of this note was sent to the referring physician  Diagnoses and all orders for this visit:    Benign prostatic hyperplasia without urinary obstruction  -     Ambulatory Referral to Urology  -     POCT Measure PVR  -     US kidney and bladder; Future  -     PSA Total, Diagnostic; Future  -     LORazepam (ATIVAN) 1 mg tablet; Take 2 tablets (2 mg total) by mouth 1 (one) time for 1 dose Take 1 or 2 tablets 1 hour prior to cystoscopy    Other orders  -     valACYclovir (VALTREX) 500 mg tablet; Take 500 mg by mouth daily            Assessment and plan:       1  Lower urinary tract symptoms  -Trialed on tamsulosin by primary care physician, minimal improvement noted   -highly symptomatic    2 prostate cancer screening  - Negative RODOLFO  - No PSAs available, orders placed    #2 family history of nephrolithiasis  - Occasional left flank pain  - Recommended renal ultrasound      Today, we discussed a stepwise approach in treating lower urinary tract symptoms associated with BPH  Lower urinary tract symptoms (LUTS) can be sub-divided into those that result from failure of the bladder to store urine normally ("storage symptoms"), those that result from difficulties in emptying ("voiding symptoms"), or those that follow micturition ("post-micturition symptoms")  Obstructive symptoms such as hesitancy, weak stream, and pushing to urinate are classified as voiding symptoms  OAB is due to the inability of the bladder to store urine normally  The symptoms of frequency, urgency, nocturia, and urge incontinence are classified as storage symptoms  I discussed the mainstays of therapy for voiding symptoms including alpha blockers, 5-alpha reductase inhibitors, and surgical management including TURP (laser, monopolar, bipolar, button electrode), TUIP, and prostatectomy  I had a candid discussion about the risks of each of these medications and the mechanism of action   I also discussed the use of PD5 inhibitors for LUTS  I recommended flexible cystoscopy for further evaluation  Discussed the risks benefits and alternatives to this diagnostic procedure  Risks include but are not limited to hematuria, pain, urinary tract infection, stricture formation, possible need for hospitalization  Patient verbalized understanding and has elected to proceed  Cystoscopy will be scheduled in the near future  Renal ultrasound ordered to screen for nephrolithiasis, PSA ordered, follow-up for cystoscopy and transrectal ultrasound    Patient expresses significant anxiety about the procedure  I did offer a one-time prescription of an anxiolytic  He understands he will require a   This was ordered as well  Estrellita Pittman MD      Chief Complaint     BPH consultation      History of Present Illness     Bijal Lynch is a 63 y o  male referred in consultation for BPH with lower urinary tract symptoms    Detailed Urologic History     - please refer to HPI    Review of Systems     Review of Systems   Constitutional: Negative for activity change and fatigue  HENT: Negative for congestion  Eyes: Negative for visual disturbance  Respiratory: Negative for shortness of breath and wheezing  Cardiovascular: Negative for chest pain and leg swelling  Gastrointestinal: Negative for abdominal pain  Endocrine: Negative for polyuria  Genitourinary: Negative for dysuria, flank pain, hematuria and urgency  Musculoskeletal: Negative for back pain  Allergic/Immunologic: Negative for immunocompromised state  Neurological: Negative for dizziness and numbness  Psychiatric/Behavioral: Negative for dysphoric mood  All other systems reviewed and are negative      AUA SYMPTOM SCORE    Flowsheet Row Most Recent Value   AUA SYMPTOM SCORE    How often have you had a sensation of not emptying your bladder completely after you finished urinating? 5 (P)     How often have you had to urinate again less than two hours after you finished urinating? 3 (P)     How often have you found you stopped and started again several times when you urinate? 5 (P)     How often have you found it difficult to postpone urination? 3 (P)     How often have you had a weak urinary stream? 5 (P)     How often have you had to push or strain to begin urination? 2 (P)     How many times did you most typically get up to urinate from the time you went to bed at night until the time you got up in the morning? 5 (P)     Quality of Life: If you were to spend the rest of your life with your urinary condition just the way it is now, how would you feel about that? 4 (P)     AUA SYMPTOM SCORE 28 (P)             Allergies     No Known Allergies    Physical Exam       Physical Exam  Constitutional:       General: He is not in acute distress  Appearance: He is well-developed  HENT:      Head: Normocephalic and atraumatic  Cardiovascular:      Comments: Negative lower extremity edema  Pulmonary:      Effort: Pulmonary effort is normal       Breath sounds: Normal breath sounds  Abdominal:      Palpations: Abdomen is soft  Musculoskeletal:         General: Normal range of motion  Cervical back: Normal range of motion  Skin:     General: Skin is warm  Neurological:      Mental Status: He is alert and oriented to person, place, and time  Psychiatric:         Behavior: Behavior normal            Vital Signs  There were no vitals filed for this visit        Current Medications       Current Outpatient Medications:   •  esomeprazole (NexIUM) 20 mg capsule, Take 1 capsule (20 mg total) by mouth 2 (two) times a day before meals, Disp: 180 capsule, Rfl: 3  •  Fluticasone Propionate (Xhance) 93 MCG/ACT EXHU, 1 spray into each nostril 2 (two) times a day, Disp: 16 mL, Rfl: 5  •  fluticasone-vilanterol (BREO ELLIPTA) 200-25 MCG/INH inhaler, Inhale 1 puff daily Rinse mouth after use , Disp: 3 each, Rfl: 3  •  montelukast (SINGULAIR) 10 mg tablet, Take 1 tablet (10 mg total) by mouth every morning, Disp: 90 tablet, Rfl: 3  •  Multiple Vitamin (MULTIVITAMIN) tablet, Take 1 tablet by mouth daily, Disp: , Rfl:   •  rOPINIRole (REQUIP) 2 mg tablet, Take 1 tablet (2 mg total) by mouth daily at bedtime, Disp: 90 tablet, Rfl: 3  •  rosuvastatin (CRESTOR) 10 MG tablet, Take 1 tablet (10 mg total) by mouth daily at bedtime, Disp: 90 tablet, Rfl: 3  •  tamsulosin (FLOMAX) 0 4 mg, Take 1 capsule (0 4 mg total) by mouth daily with dinner, Disp: 30 capsule, Rfl: 2  •  Ventolin  (90 Base) MCG/ACT inhaler, Inhale 2 puffs every 4 (four) hours as needed for wheezing or shortness of breath FOUR Inahlers and 3 refills, Disp: 72 g, Rfl: 3      Active Problems     Patient Active Problem List   Diagnosis   • Hypercholesterolemia   • Asthma   • Benign prostatic hyperplasia without urinary obstruction   • Testicular hypogonadism   • Restless leg syndrome   • TAJ (obstructive sleep apnea)   • Complete tear of right rotator cuff   • Elevated BP without diagnosis of hypertension   • Moderate persistent asthma, uncomplicated    • Snoring   • Excessive daytime sleepiness         Past Medical History     Past Medical History:   Diagnosis Date   • Asthma    • Hyperlipidemia    • Nasal polyps    • Near syncope    • Sleep apnea, obstructive          Surgical History     Past Surgical History:   Procedure Laterality Date   • ARTHROSCOPY KNEE     • COLONOSCOPY     • CT SURGICAL ARTHROSCOPY SHOULDER REMOVAL LOOSE/FB Right 12/6/2018    Procedure: ARTHROSCOPIC SHOULDER;  Surgeon: Ian Trevino MD;  Location: [facility];  Service: Orthopedics   • CT SURGICAL ARTHROSCOPY SHOULDER W/ROTATOR CUFF RPR Right 12/6/2018    Procedure: REPAIR ROTATOR CUFF  ARTHROSCOPIC WITH POSSIBLE REMOVAL LOOSE BODIES AND POSSIBLE DECOMPRESSION;  Surgeon: Ian Trevino MD;  Location: [facility];  Service: Orthopedics   • SINUS SURGERY           Family History     Family History Problem Relation Age of Onset   • Dementia Mother    • Kidney disease Mother    • Dementia Father    • Rheumatologic disease Father    • Other Father         rheumatism   • No Known Problems Sister    • No Known Problems Brother    • No Known Problems Maternal Aunt    • Vision loss Maternal Uncle    • No Known Problems Paternal Aunt    • No Known Problems Paternal Uncle    • No Known Problems Maternal Grandmother    • No Known Problems Maternal Grandfather    • No Known Problems Paternal Grandmother    • No Known Problems Paternal Grandfather    • ADD / ADHD Neg Hx    • Anesthesia problems Neg Hx    • Cancer Neg Hx    • Clotting disorder Neg Hx    • Collagen disease Neg Hx    • Diabetes Neg Hx    • Dislocations Neg Hx    • Learning disabilities Neg Hx    • Neurological problems Neg Hx    • Osteoporosis Neg Hx    • Scoliosis Neg Hx    • Vascular Disease Neg Hx          Social History     Social History     Social History     Tobacco Use   Smoking Status Former   • Packs/day: 0 50   • Years: 15 00   • Pack years: 7 50   • Types: Cigarettes   • Quit date:    • Years since quittin 2   Smokeless Tobacco Never   Tobacco Comments    smoked 3-4 days          Pertinent Lab Values     Lab Results   Component Value Date    CREATININE 1 12 2023       No results found for: PSA    @RESULTRCNT(1H])@      Pertinent Imaging      - n/a    Portions of the record may have been created with voice recognition software  Occasional wrong word or "sound a like" substitutions may have occurred due to the inherent limitations of voice recognition software  Read the chart carefully and recognize, using context, where substitutions have occurred

## 2023-03-15 ENCOUNTER — OFFICE VISIT (OUTPATIENT)
Dept: UROLOGY | Facility: CLINIC | Age: 63
End: 2023-03-15

## 2023-03-15 VITALS
BODY MASS INDEX: 28.99 KG/M2 | DIASTOLIC BLOOD PRESSURE: 72 MMHG | HEART RATE: 61 BPM | HEIGHT: 72 IN | WEIGHT: 214 LBS | OXYGEN SATURATION: 96 % | SYSTOLIC BLOOD PRESSURE: 120 MMHG

## 2023-03-15 DIAGNOSIS — N40.0 BENIGN PROSTATIC HYPERPLASIA WITHOUT URINARY OBSTRUCTION: Primary | ICD-10-CM

## 2023-03-15 LAB — POST-VOID RESIDUAL VOLUME, ML POC: 72 ML

## 2023-03-15 RX ORDER — VALACYCLOVIR HYDROCHLORIDE 500 MG/1
500 TABLET, FILM COATED ORAL DAILY
COMMUNITY
Start: 2023-02-22

## 2023-03-15 RX ORDER — LORAZEPAM 1 MG/1
2 TABLET ORAL ONCE
Qty: 2 TABLET | Refills: 0 | Status: SHIPPED | OUTPATIENT
Start: 2023-03-15 | End: 2023-03-15

## 2023-03-22 ENCOUNTER — APPOINTMENT (OUTPATIENT)
Dept: LAB | Facility: CLINIC | Age: 63
End: 2023-03-22

## 2023-03-22 DIAGNOSIS — N40.0 BENIGN PROSTATIC HYPERPLASIA WITHOUT URINARY OBSTRUCTION: ICD-10-CM

## 2023-03-22 LAB — PSA SERPL-MCNC: 0.6 NG/ML (ref 0–4)

## 2023-04-06 ENCOUNTER — HOSPITAL ENCOUNTER (OUTPATIENT)
Dept: RADIOLOGY | Facility: HOSPITAL | Age: 63
Discharge: HOME | End: 2023-04-06

## 2023-04-06 DIAGNOSIS — N40.0 BENIGN PROSTATIC HYPERPLASIA WITHOUT URINARY OBSTRUCTION: ICD-10-CM

## 2023-05-04 ENCOUNTER — PROCEDURE VISIT (OUTPATIENT)
Dept: UROLOGY | Facility: CLINIC | Age: 63
End: 2023-05-04

## 2023-05-04 VITALS
WEIGHT: 215.2 LBS | HEART RATE: 66 BPM | BODY MASS INDEX: 29.15 KG/M2 | RESPIRATION RATE: 16 BRPM | HEIGHT: 72 IN | OXYGEN SATURATION: 98 % | DIASTOLIC BLOOD PRESSURE: 70 MMHG | SYSTOLIC BLOOD PRESSURE: 132 MMHG

## 2023-05-04 DIAGNOSIS — N40.0 BENIGN PROSTATIC HYPERPLASIA WITHOUT URINARY OBSTRUCTION: Primary | ICD-10-CM

## 2023-05-04 NOTE — PROGRESS NOTES
Cystoscopy     Date/Time 5/4/2023 3:00 PM     Performed by  Tate Caballero MD     Authorized by Tate Caballero MD          Procedure Details:  Procedure type: cystoscopy       Office Cystoscopy Procedure Note    Indication:    Medically refractory lower urinary tract symptoms    Informed consent   The risks, benefits, complications, treatment options, and expected outcomes were discussed with the patient  The patient concurred with the proposed plan and provided informed consent  Anesthesia  Lidocaine jelly 2%    Procedure  The patient was placed in the supineposition, was prepped and draped in the usual manner using sterile technique, and 2% lidocaine jelly instilled into the urethra  A 17 F flexible cystoscope was then inserted into the urethra and the urethra and bladder carefully examined  The following findings were noted:    Findings:  Urethra:  Normal  Prostate:  Normal  Bladder:  Normal  Ureteral orifices:  Normal  Other findings:  None           Specimens: None                 Complications:    None; patient tolerated the procedure well           Disposition: To home after 30 minute observation             Condition: Stable

## 2023-05-04 NOTE — PROGRESS NOTES
"Referring Physician: Joy Bean MD  A copy of this note was sent to the referring physician  Diagnoses and all orders for this visit:    Benign prostatic hyperplasia without urinary obstruction  -     Cystoscopy            Assessment and plan:       1  Lower urinary tract symptoms  -Trialed on tamsulosin by primary care physician, minimal improvement noted   -highly symptomatic  -Cystoscopy reveals a very small prostate, no anatomic signs of obstruction, overactive bladder is suspected    2 prostate cancer screening  - Negative RODOLFO  - PSA 0 6    #2 family history of nephrolithiasis  - Occasional left flank pain  - Negative renal ultrasound        The patient has clinical signs and symptoms of over active bladder, or OAB  We discussed the prevalence of these symptoms in the [place]  We discussed stepwise approach in treating urinary urgency and frequency  Lower urinary tract symptoms (LUTS) can be sub-divided into those that result from failure of the bladder to store urine normally (\"storage symptoms\"), those that result from difficulties in emptying (\"voiding symptoms\"), or those that follow micturition (\"post-micturition symptoms\")  OAB is due to the inability of the bladder to store urine normally  The symptoms of frequency, urgency, nocturia, and urge incontinence are classified as storage symptoms  I discussed the stepwise approach to treatment including behavioral strategies such as pelvic floor physical therapy, constipation management, and managing fluid intake  The next step would include anticholinergic medications and Beta-3 agonists  If these therapies fail, additional treatments include, Botox injections, tibial nerve stimulation, or sacral neuromodulation  Mirabegron prescribed  Follow-up in a few months with advanced practitioner    If his symptoms do not respond appropriately may consider Denisse Mcmahon MD      Chief Complaint     BPH follow-up      History of " Present Illness     Bonnie Nunez is a 63 y o  male referred in consultation for BPH with lower urinary tract symptoms    Detailed Urologic History     - please refer to HPI    Review of Systems     Review of Systems   Constitutional: Negative for activity change and fatigue  HENT: Negative for congestion  Eyes: Negative for visual disturbance  Respiratory: Negative for shortness of breath and wheezing  Cardiovascular: Negative for chest pain and leg swelling  Gastrointestinal: Negative for abdominal pain  Endocrine: Negative for polyuria  Genitourinary: Negative for dysuria, flank pain, hematuria and urgency  Musculoskeletal: Negative for back pain  Allergic/Immunologic: Negative for immunocompromised state  Neurological: Negative for dizziness and numbness  Psychiatric/Behavioral: Negative for dysphoric mood  All other systems reviewed and are negative      AUA SYMPTOM SCORE    Flowsheet Row Most Recent Value   AUA SYMPTOM SCORE    How often have you had a sensation of not emptying your bladder completely after you finished urinating? 5 (P)     How often have you had to urinate again less than two hours after you finished urinating? 3 (P)     How often have you found you stopped and started again several times when you urinate? 5 (P)     How often have you found it difficult to postpone urination? 3 (P)     How often have you had a weak urinary stream? 5 (P)     How often have you had to push or strain to begin urination? 2 (P)     How many times did you most typically get up to urinate from the time you went to bed at night until the time you got up in the morning? 5 (P)     Quality of Life: If you were to spend the rest of your life with your urinary condition just the way it is now, how would you feel about that? 4 (P)     AUA SYMPTOM SCORE 28 (P)             Allergies     No Known Allergies    Physical Exam       Physical Exam  Constitutional:       General: He is not in acute distress  Appearance: He is well-developed  HENT:      Head: Normocephalic and atraumatic  Cardiovascular:      Comments: Negative lower extremity edema  Pulmonary:      Effort: Pulmonary effort is normal       Breath sounds: Normal breath sounds  Abdominal:      Palpations: Abdomen is soft  Musculoskeletal:         General: Normal range of motion  Cervical back: Normal range of motion  Skin:     General: Skin is warm  Neurological:      Mental Status: He is alert and oriented to person, place, and time     Psychiatric:         Behavior: Behavior normal            Vital Signs  Vitals:    05/04/23 1505   Height: 6' (1 829 m)         Current Medications       Current Outpatient Medications:     dupilumab (DUPIXENT) subcutaneous injection, Inject 2 mL (300 mg total) under the skin every 14 (fourteen) days, Disp: 4 mL, Rfl: 5    esomeprazole (NexIUM) 20 mg capsule, Take 1 capsule (20 mg total) by mouth 2 (two) times a day before meals, Disp: 180 capsule, Rfl: 3    Fluticasone Propionate (Xhance) 93 MCG/ACT EXHU, 1 spray into each nostril 2 (two) times a day, Disp: 16 mL, Rfl: 5    fluticasone-vilanterol (BREO ELLIPTA) 200-25 MCG/INH inhaler, Inhale 1 puff daily Rinse mouth after use , Disp: 3 each, Rfl: 3    montelukast (SINGULAIR) 10 mg tablet, Take 1 tablet (10 mg total) by mouth every morning, Disp: 90 tablet, Rfl: 3    Multiple Vitamin (MULTIVITAMIN) tablet, Take 1 tablet by mouth daily, Disp: , Rfl:     rOPINIRole (REQUIP) 2 mg tablet, Take 1 tablet (2 mg total) by mouth daily at bedtime, Disp: 90 tablet, Rfl: 3    rosuvastatin (CRESTOR) 10 MG tablet, Take 1 tablet (10 mg total) by mouth daily at bedtime, Disp: 90 tablet, Rfl: 3    tamsulosin (FLOMAX) 0 4 mg, Take 1 capsule (0 4 mg total) by mouth daily with dinner, Disp: 30 capsule, Rfl: 2    valACYclovir (VALTREX) 500 mg tablet, Take 500 mg by mouth daily, Disp: , Rfl:     Ventolin  (90 Base) MCG/ACT inhaler, Inhale 2 puffs every 4 (four) hours as needed for wheezing or shortness of breath FOUR Inahlers and 3 refills, Disp: 72 g, Rfl: 3    LORazepam (ATIVAN) 1 mg tablet, Take 2 tablets (2 mg total) by mouth 1 (one) time for 1 dose Take 1 or 2 tablets 1 hour prior to cystoscopy, Disp: 2 tablet, Rfl: 0      Active Problems     Patient Active Problem List   Diagnosis    Hypercholesterolemia    Asthma    Benign prostatic hyperplasia without urinary obstruction    Testicular hypogonadism    Restless leg syndrome    TAJ (obstructive sleep apnea)    Complete tear of right rotator cuff    Elevated BP without diagnosis of hypertension    Moderate persistent asthma, uncomplicated     Snoring    Excessive daytime sleepiness         Past Medical History     Past Medical History:   Diagnosis Date    Asthma     Hyperlipidemia     Nasal polyps     Near syncope     Sleep apnea, obstructive          Surgical History     Past Surgical History:   Procedure Laterality Date    ARTHROSCOPY KNEE      COLONOSCOPY      ID SURGICAL ARTHROSCOPY SHOULDER REMOVAL LOOSE/FB Right 12/6/2018    Procedure: ARTHROSCOPIC SHOULDER;  Surgeon: Alison Avendano MD;  Location: [facility];  Service: Orthopedics    ID SURGICAL ARTHROSCOPY SHOULDER W/ROTATOR CUFF RPR Right 12/6/2018    Procedure: REPAIR ROTATOR CUFF  ARTHROSCOPIC WITH POSSIBLE REMOVAL LOOSE BODIES AND POSSIBLE DECOMPRESSION;  Surgeon: Alison Avendano MD;  Location: [facility];  Service: Orthopedics    SINUS SURGERY           Family History     Family History   Problem Relation Age of Onset    Dementia Mother     Kidney disease Mother     Dementia Father     Rheumatologic disease Father     Other Father         rheumatism    No Known Problems Sister     No Known Problems Brother     No Known Problems Maternal Aunt     Vision loss Maternal Uncle     No Known Problems Paternal Aunt     No Known Problems Paternal Uncle     No Known Problems Maternal Grandmother     No Known "Problems Maternal Grandfather     No Known Problems Paternal Grandmother     No Known Problems Paternal Grandfather     ADD / ADHD Neg Hx     Anesthesia problems Neg Hx     Cancer Neg Hx     Clotting disorder Neg Hx     Collagen disease Neg Hx     Diabetes Neg Hx     Dislocations Neg Hx     Learning disabilities Neg Hx     Neurological problems Neg Hx     Osteoporosis Neg Hx     Scoliosis Neg Hx     Vascular Disease Neg Hx          Social History     Social History     Social History     Tobacco Use   Smoking Status Former    Packs/day: 0 50    Years: 15 00    Pack years: 7 50    Types: Cigarettes    Quit date:     Years since quittin 3   Smokeless Tobacco Never   Tobacco Comments    smoked 3-4 days          Pertinent Lab Values     Lab Results   Component Value Date    CREATININE 1 12 2023       Lab Results   Component Value Date    PSA 0 6 2023       @RESULTRCNT(1H])@      Pertinent Imaging      - n/a    Portions of the record may have been created with voice recognition software  Occasional wrong word or \"sound a like\" substitutions may have occurred due to the inherent limitations of voice recognition software  Read the chart carefully and recognize, using context, where substitutions have occurred    "

## 2023-07-16 ENCOUNTER — HOSPITAL ENCOUNTER (EMERGENCY)
Facility: HOSPITAL | Age: 63
Discharge: HOME | End: 2023-07-16
Attending: EMERGENCY MEDICINE
Payer: COMMERCIAL

## 2023-07-16 VITALS
SYSTOLIC BLOOD PRESSURE: 146 MMHG | HEART RATE: 67 BPM | BODY MASS INDEX: 26.94 KG/M2 | WEIGHT: 198.6 LBS | OXYGEN SATURATION: 98 % | DIASTOLIC BLOOD PRESSURE: 87 MMHG | TEMPERATURE: 97.3 F | RESPIRATION RATE: 16 BRPM

## 2023-07-16 DIAGNOSIS — F32.A DEPRESSION: Primary | ICD-10-CM

## 2023-07-16 PROCEDURE — 99283 EMERGENCY DEPT VISIT LOW MDM: CPT | Performed by: EMERGENCY MEDICINE

## 2023-07-16 PROCEDURE — 99283 EMERGENCY DEPT VISIT LOW MDM: CPT

## 2023-07-16 NOTE — ED NOTES
11:00 62 y/o male presented to the ED by himself. Patient reports feeling overwhelmed with the stress of family issues. " I am getting a divorce. I feel like I was hit with a ton of bricks" Patient denies SI/HI/AVH/Paranoia. Patient reporst having a poor appetite " the feeling of eating comes and goes." Patient reports having poor sleep " I sleep maybe 5 hours a night". Patient stated he doesnt have any legal issues except being served a protective order by his soon to be ex wife.   Patient was looking for some outpatient reosurces" I am looking for someone to talk to" Patient was given the information for the [address] [place] [address] to follow up for outpatient services       Alysa PONCE

## 2023-07-16 NOTE — ED PROVIDER NOTES
History  Chief Complaint   Patient presents with   • Psychiatric Evaluation     Patient states that he has been feeling down for almost a month but does not want to expound on this triage, states he does not want to hurt himself or others but needs help coping. This is the first time he is seeking help due to crisis in his life as per patient. 63-year-old male presents for psychiatric evaluation he is very depressed as getting . Denies any suicidal or homicidal ideation no medical complaints. Seeks help. Prior inpatient psych hospitalization for suicidal attempts. History provided by:  Patient   used: No    Psychiatric Evaluation  Presenting symptoms: no suicidal thoughts        Prior to Admission Medications   Prescriptions Last Dose Informant Patient Reported? Taking?    Fluticasone Propionate (Xhance) 93 MCG/ACT EXHU  Self No No   Si spray into each nostril 2 (two) times a day   LORazepam (ATIVAN) 1 mg tablet   No No   Sig: Take 2 tablets (2 mg total) by mouth 1 (one) time for 1 dose Take 1 or 2 tablets 1 hour prior to cystoscopy   Mirabegron ER 25 MG TB24   No No   Sig: Take 25 mg by mouth daily at bedtime   Multiple Vitamin (MULTIVITAMIN) tablet  Self Yes No   Sig: Take 1 tablet by mouth daily   Ventolin  (90 Base) MCG/ACT inhaler  Self No No   Sig: Inhale 2 puffs every 4 (four) hours as needed for wheezing or shortness of breath FOUR Inahlers and 3 refills   dupilumab (DUPIXENT) subcutaneous injection  Self No No   Sig: Inject 2 mL (300 mg total) under the skin every 14 (fourteen) days   esomeprazole (NexIUM) 20 mg capsule  Self No No   Sig: Take 1 capsule (20 mg total) by mouth 2 (two) times a day before meals   fluticasone-vilanterol (BREO ELLIPTA) 200-25 MCG/INH inhaler  Self No No   Sig: Inhale 1 puff daily Rinse mouth after use.   montelukast (SINGULAIR) 10 mg tablet  Self No No   Sig: Take 1 tablet (10 mg total) by mouth every morning   rOPINIRole (REQUIP) 2 mg tablet  Self No No   Sig: Take 1 tablet (2 mg total) by mouth daily at bedtime   rosuvastatin (CRESTOR) 10 MG tablet  Self No No   Sig: Take 1 tablet (10 mg total) by mouth daily at bedtime   tamsulosin (FLOMAX) 0.4 mg  Self No No   Sig: Take 1 capsule (0.4 mg total) by mouth daily with dinner   valACYclovir (VALTREX) 500 mg tablet  Self Yes No   Sig: Take 500 mg by mouth daily      Facility-Administered Medications: None       Past Medical History:   Diagnosis Date   • Asthma    • Hyperlipidemia    • Nasal polyps    • Near syncope    • Sleep apnea, obstructive        Past Surgical History:   Procedure Laterality Date   • ARTHROSCOPY KNEE     • COLONOSCOPY     • [place] SURGICAL ARTHROSCOPY SHOULDER REMOVAL LOOSE/FB Right 12/6/2018    Procedure: ARTHROSCOPIC SHOULDER;  Surgeon: Shawn Fragoso MD;  Location: [facility];  Service: Orthopedics   • LA SURGICAL ARTHROSCOPY SHOULDER W/ROTATOR CUFF RPR Right 12/6/2018    Procedure: REPAIR ROTATOR CUFF  ARTHROSCOPIC WITH POSSIBLE REMOVAL LOOSE BODIES AND POSSIBLE DECOMPRESSION;  Surgeon: Shawn Fragoso MD;  [facility];  Service: Orthopedics   • SINUS SURGERY         Family History   Problem Relation Age of Onset   • Dementia Mother    • Kidney disease Mother    • Dementia Father    • Rheumatologic disease Father    • Other Father         rheumatism   • No Known Problems Sister    • No Known Problems Brother    • No Known Problems Maternal Aunt    • Vision loss Maternal Uncle    • No Known Problems Paternal Aunt    • No Known Problems Paternal Uncle    • No Known Problems Maternal Grandmother    • No Known Problems Maternal Grandfather    • No Known Problems Paternal Grandmother    • No Known Problems Paternal Grandfather    • ADD / ADHD Neg Hx    • Anesthesia problems Neg Hx    • Cancer Neg Hx    • Clotting disorder Neg Hx    • Collagen disease Neg Hx    • Diabetes Neg Hx    • Dislocations Neg Hx    • Learning disabilities Neg Hx    • Neurological problems Neg Hx    • Osteoporosis Neg Hx    • Scoliosis Neg Hx    • Vascular Disease Neg Hx      I have reviewed and agree with the history as documented. E-Cigarette/Vaping   • E-Cigarette Use Never User      E-Cigarette/Vaping Substances   • Nicotine No    • THC No    • CBD No    • Flavoring No    • Other No    • Unknown No      Social History     Tobacco Use   • Smoking status: Former     Packs/day: 0.50     Years: 15.00     Total pack years: 7.50     Types: Cigarettes     Quit date: 2000     Years since quittin.5   • Smokeless tobacco: Never   • Tobacco comments:     smoked 3-4 days    Vaping Use   • Vaping Use: Never used   Substance Use Topics   • Alcohol use: No   • Drug use: No       Review of Systems   Constitutional: Negative. HENT: Negative. Eyes: Negative. Respiratory: Negative. Cardiovascular: Negative. Gastrointestinal: Negative. Endocrine: Negative. Genitourinary: Negative. Musculoskeletal: Negative. Skin: Negative. Allergic/Immunologic: Negative. Neurological: Negative. Hematological: Negative. Psychiatric/Behavioral: Positive for dysphoric mood. Negative for suicidal ideas. All other systems reviewed and are negative. Physical Exam  Physical Exam  Vitals and nursing note reviewed. Constitutional:       Appearance: Normal appearance. HENT:      Head: Normocephalic and atraumatic. Nose: Nose normal.      Mouth/Throat:      Mouth: Mucous membranes are moist.   Eyes:      Extraocular Movements: Extraocular movements intact. Pupils: Pupils are equal, round, and reactive to light. Cardiovascular:      Rate and Rhythm: Normal rate and regular rhythm. Pulmonary:      Effort: Pulmonary effort is normal.      Breath sounds: Normal breath sounds. Abdominal:      General: Abdomen is flat. Bowel sounds are normal.      Palpations: Abdomen is soft. Musculoskeletal:         General: Normal range of motion.       Cervical back: Normal range of motion and neck supple. Skin:     General: Skin is warm. Capillary Refill: Capillary refill takes less than 2 seconds. Neurological:      General: No focal deficit present. Mental Status: He is alert and oriented to person, place, and time. Mental status is at baseline. Psychiatric:         Thought Content: Thought content normal.      Comments: Depressed tearful         Vital Signs  ED Triage Vitals [07/16/23 1000]   Temperature Pulse Respirations Blood Pressure SpO2   (!) 97.3 °F (36.3 °C) 67 16 146/87 98 %      Temp Source Heart Rate Source Patient Position - Orthostatic VS BP Location FiO2 (%)   Tympanic Monitor Sitting Right arm --      Pain Score       No Pain           Vitals:    07/16/23 1000   BP: 146/87   Pulse: 67   Patient Position - Orthostatic VS: Sitting         Visual Acuity      ED Medications  Medications - No data to display    Diagnostic Studies  Results Reviewed     None                 No orders to display              Procedures  Procedures         ED Course                               SBIRT 22yo+    Flowsheet Row Most Recent Value   Initial Alcohol Screen: US AUDIT-C     1. How often do you have a drink containing alcohol? 0 Filed at: 07/16/2023 1004   2. How many drinks containing alcohol do you have on a typical day you are drinking? 0 Filed at: 07/16/2023 1004   3a. Male UNDER 65: How often do you have five or more drinks on one occasion? 0 Filed at: 07/16/2023 1004   Audit-C Score 0 Filed at: 07/16/2023 1004   AMRIT: How many times in the past year have you. .. Used an illegal drug or used a prescription medication for non-medical reasons? Never Filed at: 07/16/2023 1004                    Medical Decision Making  Evaluated by crisis provided outpatient resources safe for discharge.         Disposition  Final diagnoses:   Depression     Time reflects when diagnosis was documented in both MDM as applicable and the Disposition within this note     Time User Action Codes Description Comment    7/16/2023 11:12 AM Dewey Landin Add [U60. A] Depression       ED Disposition     ED Disposition   Discharge    Condition   Stable    Date/Time   Sun Jul 16, 2023 11:12 AM    Comment   Marc Douglas discharge to home/self care. Follow-up Information     Follow up With Specialties Details Why Contact Info Additional Information    Ruthy Fernández MD Family Medicine Schedule an appointment as soon as possible for a visit   [address] Emergency Department Emergency Medicine  If symptoms worsen [address] Emergency Department, [address] [place] [address], [facility]          Patient's Medications   Discharge Prescriptions    No medications on file       No discharge procedures on file.     PDMP Review     None          ED Provider  Electronically Signed by           Carla Padilla DO  07/16/23 2681

## 2023-07-21 ENCOUNTER — OFFICE VISIT (OUTPATIENT)
Dept: FAMILY MEDICINE CLINIC | Facility: CLINIC | Age: 63
End: 2023-07-21
Payer: COMMERCIAL

## 2023-07-21 VITALS
BODY MASS INDEX: 26.28 KG/M2 | TEMPERATURE: 98.7 F | RESPIRATION RATE: 18 BRPM | SYSTOLIC BLOOD PRESSURE: 154 MMHG | DIASTOLIC BLOOD PRESSURE: 94 MMHG | WEIGHT: 194 LBS | HEIGHT: 72 IN | HEART RATE: 76 BPM

## 2023-07-21 DIAGNOSIS — M54.32 SCIATICA WITHOUT BACK PAIN, LEFT: Primary | ICD-10-CM

## 2023-07-21 PROCEDURE — 99214 OFFICE O/P EST MOD 30 MIN: CPT | Performed by: FAMILY MEDICINE

## 2023-07-21 RX ORDER — IBUPROFEN 600 MG/1
600 TABLET ORAL EVERY 6 HOURS PRN
Qty: 30 TABLET | Refills: 0 | Status: SHIPPED | OUTPATIENT
Start: 2023-07-21

## 2023-07-21 RX ORDER — METHYLPREDNISOLONE 4 MG/1
TABLET ORAL
Qty: 21 EACH | Refills: 0 | Status: SHIPPED | OUTPATIENT
Start: 2023-07-21

## 2023-07-21 RX ORDER — OXYCODONE HYDROCHLORIDE AND ACETAMINOPHEN 5; 325 MG/1; MG/1
1 TABLET ORAL EVERY 4 HOURS PRN
Qty: 39 TABLET | Refills: 0 | Status: SHIPPED | OUTPATIENT
Start: 2023-07-21

## 2023-07-21 RX ORDER — CYCLOBENZAPRINE HCL 10 MG
10 TABLET ORAL
Qty: 30 TABLET | Refills: 0 | Status: SHIPPED | OUTPATIENT
Start: 2023-07-21

## 2023-07-21 NOTE — PROGRESS NOTES
Chief Complaint   Patient presents with   • Leg Pain     Patient complains of left leg pain x 3 or 4 days         Patient ID: Moose Jensen is a 63 y.o. male. Leg Pain   The incident occurred 3 to 5 days ago. The incident occurred at home. There was no injury mechanism. The pain is present in the left leg and left thigh (L buttock ). The quality of the pain is described as stabbing. The pain is at a severity of 7/10. The pain is severe. The pain has been constant since onset. Associated symptoms include numbness (L foot medial aspect ) and tingling. Pertinent negatives include no inability to bear weight, loss of motion, loss of sensation or muscle weakness. He reports no foreign bodies present. The symptoms are aggravated by movement. He has tried acetaminophen for the symptoms. The treatment provided no relief. The following portions of the patient's history were reviewed and updated as appropriate: allergies, current medications, past family history, past medical history, past social history, past surgical history and problem list.    Review of Systems   Respiratory: Negative. Gastrointestinal: Negative. Genitourinary: Negative. Musculoskeletal: Positive for gait problem (due to pain ). Negative for back pain. Skin: Negative for rash. Neurological: Positive for tingling and numbness (L foot medial aspect ).        Current Outpatient Medications   Medication Sig Dispense Refill   • dupilumab (DUPIXENT) subcutaneous injection Inject 2 mL (300 mg total) under the skin every 14 (fourteen) days 4 mL 5   • esomeprazole (NexIUM) 20 mg capsule Take 1 capsule (20 mg total) by mouth 2 (two) times a day before meals 180 capsule 3   • Fluticasone Propionate (Xhance) 93 MCG/ACT EXHU 1 spray into each nostril 2 (two) times a day 16 mL 5   • Mirabegron ER 25 MG TB24 Take 25 mg by mouth daily at bedtime 30 tablet 3   • montelukast (SINGULAIR) 10 mg tablet Take 1 tablet (10 mg total) by mouth every morning 90 tablet 3   • Multiple Vitamin (MULTIVITAMIN) tablet Take 1 tablet by mouth daily     • rOPINIRole (REQUIP) 2 mg tablet Take 1 tablet (2 mg total) by mouth daily at bedtime 90 tablet 3   • rosuvastatin (CRESTOR) 10 MG tablet Take 1 tablet (10 mg total) by mouth daily at bedtime 90 tablet 3   • Ventolin  (90 Base) MCG/ACT inhaler Inhale 2 puffs every 4 (four) hours as needed for wheezing or shortness of breath FOUR Inahlers and 3 refills 72 g 3   • fluticasone-vilanterol (BREO ELLIPTA) 200-25 MCG/INH inhaler Inhale 1 puff daily Rinse mouth after use. (Patient not taking: Reported on 7/21/2023) 3 each 3   • LORazepam (ATIVAN) 1 mg tablet Take 2 tablets (2 mg total) by mouth 1 (one) time for 1 dose Take 1 or 2 tablets 1 hour prior to cystoscopy 2 tablet 0   • tamsulosin (FLOMAX) 0.4 mg Take 1 capsule (0.4 mg total) by mouth daily with dinner 30 capsule 2   • valACYclovir (VALTREX) 500 mg tablet Take 500 mg by mouth daily       No current facility-administered medications for this visit. Objective:    /94 (BP Location: Left arm, Patient Position: Sitting, Cuff Size: Standard)   Pulse 76   Temp 98.7 °F (37.1 °C)   Resp 18   Ht 6' (1.829 m)   Wt 88 kg (194 lb)   BMI 26.31 kg/m²        Physical Exam  Constitutional:       General: He is in acute distress (due to pain ). Appearance: He is not ill-appearing. Musculoskeletal:      Right lower leg: No edema. Left lower leg: Normal. No swelling. No edema. Legs:    Skin:     Findings: No rash. Neurological:      General: No focal deficit present. Mental Status: He is alert and oriented to person, place, and time. Motor: No weakness. Assessment/Plan:         Diagnoses and all orders for this visit:    Sciatica without back pain, left  -     oxyCODONE-acetaminophen (Percocet) 5-325 mg per tablet;  Take 1 tablet by mouth every 4 (four) hours as needed for moderate pain Max Daily Amount: 6 tablets  - ibuprofen (MOTRIN) 600 mg tablet; Take 1 tablet (600 mg total) by mouth every 6 (six) hours as needed for moderate pain  -     cyclobenzaprine (FLEXERIL) 10 mg tablet; Take 1 tablet (10 mg total) by mouth daily at bedtime  -     methylPREDNISolone 4 MG tablet therapy pack; Use as directed on package  -     Ambulatory Referral to Physical Therapy;  Future        rto in 1 wk                  Victor Manuel Chen MD

## 2023-07-24 ENCOUNTER — EVALUATION (OUTPATIENT)
Dept: PHYSICAL THERAPY | Facility: CLINIC | Age: 63
End: 2023-07-24
Payer: COMMERCIAL

## 2023-07-24 DIAGNOSIS — M54.32 SCIATICA WITHOUT BACK PAIN, LEFT: ICD-10-CM

## 2023-07-24 PROCEDURE — 97161 PT EVAL LOW COMPLEX 20 MIN: CPT | Performed by: PHYSICAL THERAPIST

## 2023-07-24 NOTE — PROGRESS NOTES
PT Evaluation     Today's date: 2023  Patient name: Hugo Sal  : 1960  MRN: [id number]  Referring provider: Cathy Howell MD  Dx:   Encounter Diagnosis     ICD-10-CM    1. Sciatica without back pain, left  M54.32 Ambulatory Referral to Physical Therapy                     Assessment  Assessment details: Partha Maynardolopoulospresents with signs and symptoms consistent with Sciatica without back pain, left, with loss of range of motion, strength and spinal stabilization. Presents with high reactivity. Hugo Sal would benefit with physical therapy to address these impairments to return to prior level of function. Impairments: abnormal or restricted ROM, activity intolerance, impaired physical strength, lacks appropriate home exercise program and pain with function    Goals  STG  Initiate HEP  Decrease pain by 50% in 3 weeks  Patient performing HEP 50% of time in 3 weeks  LTG  Independent with HEP  Decrease pain by 90% in 6 weeks  Patient performing HEP 90% of time in 6 weeks  FOTO > 51    in 6 weeks    Plan  Planned therapy interventions: joint mobilization, manual therapy, neuromuscular re-education, patient education, postural training, stretching, strengthening and home exercise program  Frequency: 2x week  Duration in visits: 12  Duration in weeks: 6  Treatment plan discussed with: patient        Subjective Evaluation    History of Present Illness  Mechanism of injury: Patient reports developing left leg pain radiating from the left buttock to the bottom of his foot. He denies any injury, but for a week prior as living in his car.             Recurrent probem    Quality of life: good    Patient Goals  Patient goals for therapy: decreased pain, increased motion, increased strength, independence with ADLs/IADLs and return to sport/leisure activities    Pain  Current pain ratin  At best pain ratin  At worst pain ratin  Location: left buttock to foot  Quality: radiating, knife-like, discomfort and dull ache  Relieving factors: change in position  Aggravating factors: sitting and standing  Progression: worsening    Treatments  Current treatment: physical therapy        Objective     Concurrent Complaints  Positive for night pain and bowel dysfunction. Negative for bladder dysfunction, history of cancer and history of trauma    Palpation   Left   Hypertonic in the iliopsoas. Tenderness of the iliopsoas. Right   Tenderness of the iliopsoas.      Active Range of Motion     Lumbar   Flexion: 45 degrees   Extension: 10 degrees  with pain    Strength/Myotome Testing     Lumbar   Left   Normal strength  Heel walk: normal  Toe walk: normal    Right   Normal strength  Toe walk: normal             Precautions: Medical History    Diagnosis Date Comment Source   Asthma      Hyperlipidemia      Nasal polyps      Near syncope      Sleep apnea, obstructive            Manuals                                                                 Neuro Re-Ed                                                                                                        Ther Ex                                                                                                                     Ther Activity                                       Gait Training                                       Modalities

## 2023-07-27 ENCOUNTER — OFFICE VISIT (OUTPATIENT)
Dept: PHYSICAL THERAPY | Facility: CLINIC | Age: 63
End: 2023-07-27
Payer: COMMERCIAL

## 2023-07-27 DIAGNOSIS — M54.32 SCIATICA WITHOUT BACK PAIN, LEFT: Primary | ICD-10-CM

## 2023-07-27 PROCEDURE — 97112 NEUROMUSCULAR REEDUCATION: CPT | Performed by: PHYSICAL THERAPIST

## 2023-07-27 PROCEDURE — 97110 THERAPEUTIC EXERCISES: CPT | Performed by: PHYSICAL THERAPIST

## 2023-07-27 PROCEDURE — 97140 MANUAL THERAPY 1/> REGIONS: CPT | Performed by: PHYSICAL THERAPIST

## 2023-07-27 NOTE — PROGRESS NOTES
Daily Note     Today's date: 2023  Patient name: Jarad Ty  : 1960  MRN: [id number]  Referring provider: Edison Dior MD  Dx:   Encounter Diagnosis     ICD-10-CM    1. Sciatica without back pain, left  M54.32                      Subjective: Patient has been walking more and with less pain for a few days, but increased left leg pain past 24 days      Objective: See treatment diary below      Assessment: Tolerated treatment well. Patient demonstrated fatigue post treatment and exhibited good technique with therapeutic exercises      Plan: Continue per plan of care.       Precautions: Medical History    Diagnosis Date Comment Source   Asthma      Hyperlipidemia      Nasal polyps      Near syncope      Sleep apnea, obstructive            Manuals             Man Traction 10x                                                   Neuro Re-Ed             Neurac supine pelvic lift 2x5            Neurac Bridge 2x5            Neurac SDLY Hip ADD/ABD 2x5  2x5                                                                Ther Ex             Hamstring stretch Supine 10x            Prone press up 10x                                                                                          Ther Activity                                       Gait Training                                       Modalities

## 2023-08-07 ENCOUNTER — OFFICE VISIT (OUTPATIENT)
Dept: FAMILY MEDICINE CLINIC | Facility: CLINIC | Age: 63
End: 2023-08-07
Payer: COMMERCIAL

## 2023-08-07 VITALS
RESPIRATION RATE: 16 BRPM | TEMPERATURE: 98.3 F | HEART RATE: 76 BPM | HEIGHT: 72 IN | BODY MASS INDEX: 25.49 KG/M2 | SYSTOLIC BLOOD PRESSURE: 154 MMHG | DIASTOLIC BLOOD PRESSURE: 90 MMHG | WEIGHT: 188.2 LBS

## 2023-08-07 DIAGNOSIS — M54.32 SCIATICA WITHOUT BACK PAIN, LEFT: Primary | ICD-10-CM

## 2023-08-07 PROBLEM — F43.23 ADJUSTMENT DISORDER WITH MIXED ANXIETY AND DEPRESSED MOOD: Status: ACTIVE | Noted: 2023-08-07

## 2023-08-07 PROCEDURE — 99213 OFFICE O/P EST LOW 20 MIN: CPT | Performed by: FAMILY MEDICINE

## 2023-08-07 RX ORDER — PREGABALIN 100 MG/1
100 CAPSULE ORAL 2 TIMES DAILY
Qty: 60 CAPSULE | Refills: 1 | Status: SHIPPED | OUTPATIENT
Start: 2023-08-07 | End: 2023-08-08

## 2023-08-07 NOTE — PROGRESS NOTES
Chief Complaint   Patient presents with   • Leg Pain     Patient complains of ongoing left leg pain         Patient ID: Derek Webster is a 63 y.o. male. HPI  Pt is seeing for f/u L leg sciatica - no  Back pain  -  Was Tx with medrol, pain meds, Ibuprofen 600 mg and Flexeril -  Started PT -  Went x 2 -  Improvement noticed     The following portions of the patient's history were reviewed and updated as appropriate: allergies, current medications, past family history, past medical history, past social history, past surgical history and problem list.    Review of Systems   Constitutional: Negative. Respiratory: Negative. Cardiovascular: Negative. Gastrointestinal: Negative. Genitourinary: Negative. Musculoskeletal: Negative for back pain. Skin: Negative. Neurological: Negative.         Current Outpatient Medications   Medication Sig Dispense Refill   • cyclobenzaprine (FLEXERIL) 10 mg tablet Take 1 tablet (10 mg total) by mouth daily at bedtime 30 tablet 0   • dupilumab (DUPIXENT) subcutaneous injection Inject 2 mL (300 mg total) under the skin every 14 (fourteen) days 4 mL 5   • esomeprazole (NexIUM) 20 mg capsule Take 1 capsule (20 mg total) by mouth 2 (two) times a day before meals 180 capsule 3   • Fluticasone Propionate (Xhance) 93 MCG/ACT EXHU 1 spray into each nostril 2 (two) times a day 16 mL 5   • ibuprofen (MOTRIN) 600 mg tablet Take 1 tablet (600 mg total) by mouth every 6 (six) hours as needed for moderate pain 30 tablet 0   • Mirabegron ER 25 MG TB24 Take 25 mg by mouth daily at bedtime 30 tablet 3   • montelukast (SINGULAIR) 10 mg tablet Take 1 tablet (10 mg total) by mouth every morning 90 tablet 3   • Multiple Vitamin (MULTIVITAMIN) tablet Take 1 tablet by mouth daily     • oxyCODONE-acetaminophen (Percocet) 5-325 mg per tablet Take 1 tablet by mouth every 4 (four) hours as needed for moderate pain Max Daily Amount: 6 tablets 39 tablet 0   • rOPINIRole (REQUIP) 2 mg tablet Take 1 tablet (2 mg total) by mouth daily at bedtime 90 tablet 3   • rosuvastatin (CRESTOR) 10 MG tablet Take 1 tablet (10 mg total) by mouth daily at bedtime 90 tablet 3   • valACYclovir (VALTREX) 500 mg tablet Take 500 mg by mouth daily     • Ventolin  (90 Base) MCG/ACT inhaler Inhale 2 puffs every 4 (four) hours as needed for wheezing or shortness of breath FOUR Inahlers and 3 refills 72 g 3   • fluticasone-vilanterol (BREO ELLIPTA) 200-25 MCG/INH inhaler Inhale 1 puff daily Rinse mouth after use. (Patient not taking: Reported on 7/21/2023) 3 each 3   • methylPREDNISolone 4 MG tablet therapy pack Use as directed on package (Patient not taking: Reported on 8/7/2023) 21 each 0   • tamsulosin (FLOMAX) 0.4 mg Take 1 capsule (0.4 mg total) by mouth daily with dinner (Patient not taking: Reported on 8/7/2023) 30 capsule 2     No current facility-administered medications for this visit. Objective:    /90 (BP Location: Left arm, Patient Position: Sitting, Cuff Size: Large)   Pulse 76   Temp 98.3 °F (36.8 °C)   Resp 16   Ht 6' (1.829 m)   Wt 85.4 kg (188 lb 3.2 oz)   BMI 25.52 kg/m²        Physical Exam  Constitutional:       Appearance: He is not ill-appearing. Cardiovascular:      Rate and Rhythm: Normal rate. Pulmonary:      Effort: Pulmonary effort is normal. No respiratory distress. Musculoskeletal:      Right lower leg: No edema. Left lower leg: No edema. Neurological:      Mental Status: He is alert and oriented to person, place, and time. Psychiatric:         Mood and Affect: Mood normal.                 Assessment/Plan:         Diagnoses and all orders for this visit:    Sciatica without back pain, left  -     pregabalin (LYRICA) 100 mg capsule;  Take 1 capsule (100 mg total) by mouth 2 (two) times a day      will start med at bedtime only and increase to BID in 1 wk   Cont PT   rto in 2-3 wks                   Shaji Whitaker MD

## 2023-08-08 ENCOUNTER — TELEPHONE (OUTPATIENT)
Dept: FAMILY MEDICINE CLINIC | Facility: CLINIC | Age: 63
End: 2023-08-08

## 2023-08-08 DIAGNOSIS — M54.32 SCIATICA WITHOUT BACK PAIN, LEFT: Primary | ICD-10-CM

## 2023-08-08 RX ORDER — GABAPENTIN 100 MG/1
100 CAPSULE ORAL 2 TIMES DAILY
Qty: 60 CAPSULE | Refills: 3 | Status: SHIPPED | OUTPATIENT
Start: 2023-08-08

## 2023-08-08 NOTE — TELEPHONE ENCOUNTER
Dr. Kam Lee received a denial for pts Lyrica. Per Dr. Kam Lee Patient has to try Gabapentin first Patient is aware and expressed full understanding.

## 2023-10-07 DIAGNOSIS — N40.0 BENIGN PROSTATIC HYPERPLASIA WITHOUT URINARY OBSTRUCTION: ICD-10-CM

## 2023-10-09 RX ORDER — MIRABEGRON 25 MG/1
25 TABLET, FILM COATED, EXTENDED RELEASE ORAL
Qty: 30 TABLET | Refills: 3 | Status: SHIPPED | OUTPATIENT
Start: 2023-10-09

## 2024-01-08 ENCOUNTER — TELEPHONE (OUTPATIENT)
Dept: FAMILY MEDICINE CLINIC | Facility: CLINIC | Age: 64
End: 2024-01-08

## 2024-01-08 NOTE — TELEPHONE ENCOUNTER
LMTRC. Patient scheduled online new patient appointment with Dr Link 1/17. Need to know if patient is on any meds, prior PCP information to request records.

## 2024-01-17 ENCOUNTER — RA CDI HCC (OUTPATIENT)
Dept: OTHER | Facility: HOSPITAL | Age: 64
End: 2024-01-17

## 2024-01-19 DIAGNOSIS — E78.00 HYPERCHOLESTEROLEMIA: ICD-10-CM

## 2024-01-19 RX ORDER — ROSUVASTATIN CALCIUM 10 MG/1
10 TABLET, COATED ORAL
Qty: 90 TABLET | Refills: 0 | Status: SHIPPED | OUTPATIENT
Start: 2024-01-19 | End: 2024-01-24 | Stop reason: SDUPTHER

## 2024-01-24 ENCOUNTER — OFFICE VISIT (OUTPATIENT)
Dept: FAMILY MEDICINE CLINIC | Facility: CLINIC | Age: 64
End: 2024-01-24
Payer: COMMERCIAL

## 2024-01-24 VITALS
HEART RATE: 68 BPM | SYSTOLIC BLOOD PRESSURE: 120 MMHG | OXYGEN SATURATION: 98 % | DIASTOLIC BLOOD PRESSURE: 80 MMHG | RESPIRATION RATE: 14 BRPM | BODY MASS INDEX: 26.41 KG/M2 | HEIGHT: 72 IN | TEMPERATURE: 98.4 F | WEIGHT: 195 LBS

## 2024-01-24 DIAGNOSIS — F33.9 DEPRESSION, RECURRENT (HCC): ICD-10-CM

## 2024-01-24 DIAGNOSIS — L65.9 HAIR LOSS: Primary | ICD-10-CM

## 2024-01-24 DIAGNOSIS — E78.00 HYPERCHOLESTEROLEMIA: ICD-10-CM

## 2024-01-24 DIAGNOSIS — Z00.00 MEDICARE ANNUAL WELLNESS VISIT, SUBSEQUENT: ICD-10-CM

## 2024-01-24 DIAGNOSIS — J45.40 MODERATE PERSISTENT ASTHMA, UNCOMPLICATED: ICD-10-CM

## 2024-01-24 PROBLEM — F32.A DEPRESSION: Status: ACTIVE | Noted: 2023-07-15

## 2024-01-24 PROBLEM — F43.23 ADJUSTMENT DISORDER WITH MIXED ANXIETY AND DEPRESSED MOOD: Status: RESOLVED | Noted: 2023-08-07 | Resolved: 2024-01-24

## 2024-01-24 PROBLEM — F41.9 ANXIETY: Status: ACTIVE | Noted: 2023-07-15

## 2024-01-24 PROCEDURE — 99214 OFFICE O/P EST MOD 30 MIN: CPT | Performed by: FAMILY MEDICINE

## 2024-01-24 PROCEDURE — G0439 PPPS, SUBSEQ VISIT: HCPCS | Performed by: FAMILY MEDICINE

## 2024-01-24 RX ORDER — BUSPIRONE HYDROCHLORIDE 10 MG/1
10 TABLET ORAL 2 TIMES DAILY
COMMUNITY
Start: 2024-01-17

## 2024-01-24 RX ORDER — ROSUVASTATIN CALCIUM 10 MG/1
10 TABLET, COATED ORAL
Qty: 90 TABLET | Refills: 3 | Status: SHIPPED | OUTPATIENT
Start: 2024-01-24

## 2024-01-24 RX ORDER — BUPROPION HYDROCHLORIDE 300 MG/1
300 TABLET ORAL EVERY MORNING
COMMUNITY
Start: 2024-01-09

## 2024-01-24 RX ORDER — TRAZODONE HYDROCHLORIDE 100 MG/1
100 TABLET ORAL
COMMUNITY
Start: 2024-01-17

## 2024-01-24 NOTE — PROGRESS NOTES
Assessment and Plan:     Problem List Items Addressed This Visit          Respiratory    Moderate persistent asthma, uncomplicated        Other    Hypercholesterolemia    Relevant Medications    rosuvastatin (CRESTOR) 10 MG tablet    Other Relevant Orders    Comprehensive metabolic panel    Lipid panel    Depression, recurrent (HCC)    Relevant Medications    buPROPion (WELLBUTRIN XL) 300 mg 24 hr tablet    busPIRone (BUSPAR) 10 mg tablet    traZODone (DESYREL) 100 mg tablet     Other Visit Diagnoses       Hair loss    -  Primary    Relevant Orders    TSH, 3rd generation  Will see dermatologist     Medicare annual wellness visit, subsequent                   Preventive health issues were discussed with patient, and age appropriate screening tests were ordered as noted in patient's After Visit Summary.  Personalized health advice and appropriate referrals for health education or preventive services given if needed, as noted in patient's After Visit Summary.     History of Present Illness:     Patient presents for a Medicare Wellness Visit    Pt is seeing for f/u HLD -  stable -  has Asthma -  stopped Breo - under pulmonologist care -  has Depression -  seeing therapist and psychiatrist        Patient Care Team:  Loren Mcelroy MD as PCP - General (Family Medicine)     Review of Systems:     Review of Systems   Constitutional:  Positive for fatigue. Negative for activity change and appetite change.   HENT: Negative.     Respiratory: Negative.     Cardiovascular: Negative.    Gastrointestinal: Negative.    Genitourinary: Negative.    Musculoskeletal: Negative.    Skin: Negative.         Hair loss    Neurological: Negative.    Psychiatric/Behavioral:  Positive for decreased concentration, dysphoric mood and sleep disturbance. Negative for behavioral problems, confusion, self-injury and suicidal ideas.         Problem List:     Patient Active Problem List   Diagnosis   • Hypercholesterolemia   • Asthma   • Benign  prostatic hyperplasia without urinary obstruction   • Testicular hypogonadism   • Restless leg syndrome   • TAJ (obstructive sleep apnea)   • Complete tear of right rotator cuff   • Elevated BP without diagnosis of hypertension   • Moderate persistent asthma, uncomplicated    • Snoring   • Excessive daytime sleepiness   • Depression, recurrent (HCC)   • Anxiety      Past Medical and Surgical History:     Past Medical History:   Diagnosis Date   • Asthma    • Hyperlipidemia    • Nasal polyps    • Near syncope    • Sleep apnea, obstructive      Past Surgical History:   Procedure Laterality Date   • ARTHROSCOPY KNEE     • COLONOSCOPY     • AR SURGICAL ARTHROSCOPY SHOULDER REMOVAL LOOSE/FB Right 12/6/2018    Procedure: ARTHROSCOPIC SHOULDER;  Surgeon: Mickey Gaytan MD;  Location: [facility];  Service: Orthopedics   • AR SURGICAL ARTHROSCOPY SHOULDER W/ROTATOR CUFF RPR Right 12/6/2018    Procedure: REPAIR ROTATOR CUFF  ARTHROSCOPIC WITH POSSIBLE REMOVAL LOOSE BODIES AND POSSIBLE DECOMPRESSION;  Surgeon: Mickey Gaytan MD;  Location: [facility];  Service: Orthopedics   • SINUS SURGERY        Family History:     Family History   Problem Relation Age of Onset   • Dementia Mother    • Kidney disease Mother    • Dementia Father    • Rheumatologic disease Father    • Other Father         rheumatism   • No Known Problems Sister    • No Known Problems Brother    • No Known Problems Maternal Aunt    • Vision loss Maternal Uncle    • No Known Problems Paternal Aunt    • No Known Problems Paternal Uncle    • No Known Problems Maternal Grandmother    • No Known Problems Maternal Grandfather    • No Known Problems Paternal Grandmother    • No Known Problems Paternal Grandfather    • ADD / ADHD Neg Hx    • Anesthesia problems Neg Hx    • Cancer Neg Hx    • Clotting disorder Neg Hx    • Collagen disease Neg Hx    • Diabetes Neg Hx    • Dislocations Neg Hx    • Learning disabilities Neg Hx    • Neurological problems Neg Hx    •  Osteoporosis Neg Hx    • Scoliosis Neg Hx    • Vascular Disease Neg Hx       Social History:     Social History     Socioeconomic History   • Marital status: Legally      Spouse name: None   • Number of children: None   • Years of education: None   • Highest education level: None   Occupational History   • None   Tobacco Use   • Smoking status: Former     Current packs/day: 0.00     Average packs/day: 0.5 packs/day for 15.0 years (7.5 ttl pk-yrs)     Types: Cigarettes     Start date:      Quit date:      Years since quittin.0   • Smokeless tobacco: Never   • Tobacco comments:     smoked 3-4 days    Vaping Use   • Vaping status: Never Used   Substance and Sexual Activity   • Alcohol use: No   • Drug use: No   • Sexual activity: None   Other Topics Concern   • None   Social History Narrative   • None     Social Determinants of Health     Financial Resource Strain: High Risk (2024)    Overall Financial Resource Strain (CARDIA)    • Difficulty of Paying Living Expenses: Hard   Food Insecurity: Not on file   Transportation Needs: Unmet Transportation Needs (2024)    PRAPARE - Transportation    • Lack of Transportation (Medical): Yes    • Lack of Transportation (Non-Medical): Yes   Physical Activity: Not on file   Stress: Not on file   Social Connections: Not on file   Intimate Partner Violence: Not on file   Housing Stability: Not on file      Medications and Allergies:     Current Outpatient Medications   Medication Sig Dispense Refill   • buPROPion (WELLBUTRIN XL) 300 mg 24 hr tablet Take 300 mg by mouth every morning     • busPIRone (BUSPAR) 10 mg tablet Take 10 mg by mouth 2 (two) times a day     • cyclobenzaprine (FLEXERIL) 10 mg tablet Take 1 tablet (10 mg total) by mouth daily at bedtime 30 tablet 0   • Dupixent subcutaneous injection INJECT 1 SYRINGE UNDER THE SKIN EVERY 14 DAYS 4 mL 5   • esomeprazole (NexIUM) 20 mg capsule Take 1 capsule (20 mg total) by mouth 2 (two) times a day  before meals 180 capsule 3   • Fluticasone Propionate (Xhance) 93 MCG/ACT EXHU 1 spray into each nostril 2 (two) times a day 16 mL 5   • gabapentin (Neurontin) 100 mg capsule Take 1 capsule (100 mg total) by mouth 2 (two) times a day 60 capsule 3   • ibuprofen (MOTRIN) 600 mg tablet Take 1 tablet (600 mg total) by mouth every 6 (six) hours as needed for moderate pain 30 tablet 0   • montelukast (SINGULAIR) 10 mg tablet take 1 tablet by mouth every morning 90 tablet 3   • Multiple Vitamin (MULTIVITAMIN) tablet Take 1 tablet by mouth daily     • Myrbetriq 25 MG TB24 take 1 tablet by mouth once daily at bedtime 30 tablet 3   • rOPINIRole (REQUIP) 2 mg tablet Take 1 tablet (2 mg total) by mouth daily at bedtime 90 tablet 3   • rosuvastatin (CRESTOR) 10 MG tablet take 1 tablet by mouth at bedtime 90 tablet 0   • traZODone (DESYREL) 100 mg tablet Take 100 mg by mouth daily at bedtime     • valACYclovir (VALTREX) 500 mg tablet Take 500 mg by mouth daily     • fluticasone-vilanterol (BREO ELLIPTA) 200-25 MCG/INH inhaler Inhale 1 puff daily Rinse mouth after use. (Patient not taking: Reported on 7/21/2023) 3 each 3   • oxyCODONE-acetaminophen (Percocet) 5-325 mg per tablet Take 1 tablet by mouth every 4 (four) hours as needed for moderate pain Max Daily Amount: 6 tablets (Patient not taking: Reported on 1/24/2024) 39 tablet 0   • Ventolin  (90 Base) MCG/ACT inhaler Inhale 2 puffs every 4 (four) hours as needed for wheezing or shortness of breath FOUR Inahlers and 3 refills 72 g 3     No current facility-administered medications for this visit.     No Known Allergies   Immunizations:     Immunization History   Administered Date(s) Administered   • COVID-19 MODERNA VACC 0.5 ML IM 03/17/2021, 04/16/2021, 11/21/2021   • Influenza Quadrivalent Preservative Free Pediatric IM 10/01/2016   • Influenza, injectable, quadrivalent, preservative free 0.5 mL 11/05/2018   • Influenza, seasonal, injectable 11/06/2017   • Pneumococcal  Conjugate Vaccine 20-valent (Pcv20), Polysace 10/25/2022      Health Maintenance:         Topic Date Due   • Hepatitis C Screening  Never done   • HIV Screening  Never done   • Colorectal Cancer Screening  06/16/2026         Topic Date Due   • Influenza Vaccine (1) 09/01/2023   • COVID-19 Vaccine (4 - 2023-24 season) 09/01/2023      Medicare Screening Tests and Risk Assessments:     Marc is here for his Subsequent Wellness visit.     Health Risk Assessment:   Patient rates overall health as fair. Patient feels that their physical health rating is slightly worse. Patient is dissatisfied with their life. Eyesight was rated as same. Hearing was rated as same. Patient feels that their emotional and mental health rating is slightly worse. Patients states they are sometimes angry. Patient states they are often unusually tired/fatigued. Pain experienced in the last 7 days has been none. Patient states that he has experienced weight loss or gain in last 6 months.     Depression Screening:   PHQ-9 Score: 14      Fall Risk Screening:   In the past year, patient has experienced: no history of falling in past year      Home Safety:  Patient does not have trouble with stairs inside or outside of their home. Patient has working smoke alarms and has working carbon monoxide detector. Home safety hazards include: uneven floors, poor household lighting and not having non-slip bath and/or shower mats.     Nutrition:   Current diet is Unhealthy.     Medications:   Patient is currently taking over-the-counter supplements. OTC medications include: see medication list. Patient is able to manage medications.     Activities of Daily Living (ADLs)/Instrumental Activities of Daily Living (IADLs):   Walk and transfer into and out of bed and chair?: Yes  Dress and groom yourself?: Yes    Bathe or shower yourself?: Yes    Feed yourself? Yes  Do your laundry/housekeeping?: Yes  Manage your money, pay your bills and track your expenses?:  Yes  Make your own meals?: No    Do your own shopping?: Yes    Previous Hospitalizations:   Any hospitalizations or ED visits within the last 12 months?: Yes    How many hospitalizations have you had in the last year?: 1-2    Advance Care Planning:   Living will: No    Durable POA for healthcare: No    Advanced directive: No      Cognitive Screening:   Provider or family/friend/caregiver concerned regarding cognition?: No    PREVENTIVE SCREENINGS      Cardiovascular Screening:    General: Screening Not Indicated and History Lipid Disorder    Due for: Lipid Panel      Diabetes Screening:       Due for: Blood Glucose      Colorectal Cancer Screening:     General: Screening Current      Prostate Cancer Screening:    General: Screening Current      Osteoporosis Screening:    General: Screening Not Indicated      Abdominal Aortic Aneurysm (AAA) Screening:    Risk factors include: tobacco use        General: Screening Not Indicated      Lung Cancer Screening:     General: Screening Not Indicated      Hepatitis C Screening:    General: Screening Not Indicated    Screening, Brief Intervention, and Referral to Treatment (SBIRT)    Screening  Typical number of drinks in a day: 0  Typical number of drinks in a week: 2  Interpretation: Low risk drinking behavior.    AUDIT-C Screenin) How often did you have a drink containing alcohol in the past year? 2 to 4 times a month  2) How many drinks did you have on a typical day when you were drinking in the past year? 0  3) How often did you have 6 or more drinks on one occasion in the past year? never    AUDIT-C Score: 2  Interpretation: Score 0-3 (male): Negative screen for alcohol misuse    Single Item Drug Screening:  How often have you used an illegal drug (including marijuana) or a prescription medication for non-medical reasons in the past year? never    Single Item Drug Screen Score: 0  Interpretation: Negative screen for possible drug use disorder    No results found.      Physical Exam:     /80 (BP Location: Left arm, Patient Position: Sitting, Cuff Size: Adult)   Pulse 68   Temp 98.4 °F (36.9 °C) (Temporal)   Resp 14   Ht 6' (1.829 m)   Wt 88.5 kg (195 lb)   SpO2 98%   BMI 26.45 kg/m²     Physical Exam  Constitutional:       General: He is not in acute distress.     Appearance: He is not ill-appearing.   Cardiovascular:      Rate and Rhythm: Normal rate and regular rhythm.      Heart sounds: No murmur heard.  Pulmonary:      Effort: Pulmonary effort is normal. No respiratory distress.      Breath sounds: No wheezing or rhonchi.   Musculoskeletal:      Right lower leg: No edema.      Left lower leg: No edema.   Skin:     Findings: No rash.   Neurological:      General: No focal deficit present.      Mental Status: He is alert and oriented to person, place, and time.      Motor: No weakness.      Gait: Gait normal.   Psychiatric:         Mood and Affect: Mood normal.         Thought Content: Thought content normal.         Judgment: Judgment normal.          Loren Mcelroy MD

## 2024-01-24 NOTE — PATIENT INSTRUCTIONS
Medicare Preventive Visit Patient Instructions  Thank you for completing your Welcome to Medicare Visit or Medicare Annual Wellness Visit today. Your next wellness visit will be due in one year (1/24/2025).  The screening/preventive services that you may require over the next 5-10 years are detailed below. Some tests may not apply to you based off risk factors and/or age. Screening tests ordered at today's visit but not completed yet may show as past due. Also, please note that scanned in results may not display below.  Preventive Screenings:  Service Recommendations Previous Testing/Comments   Colorectal Cancer Screening  Colonoscopy    Fecal Occult Blood Test (FOBT)/Fecal Immunochemical Test (FIT)  Fecal DNA/Cologuard Test  Flexible Sigmoidoscopy Age: 45-75 years old   Colonoscopy: every 10 years (May be performed more frequently if at higher risk)  OR  FOBT/FIT: every 1 year  OR  Cologuard: every 3 years  OR  Sigmoidoscopy: every 5 years  Screening may be recommended earlier than age 45 if at higher risk for colorectal cancer. Also, an individualized decision between you and your healthcare provider will decide whether screening between the ages of 76-85 would be appropriate. Colonoscopy: 06/16/2016  FOBT/FIT: Not on file  Cologuard: Not on file  Sigmoidoscopy: Not on file          Prostate Cancer Screening Individualized decision between patient and health care provider in men between ages of 55-69   Medicare will cover every 12 months beginning on the day after your 50th birthday PSA: 0.6 ng/mL           Hepatitis C Screening Once for adults born between 1945 and 1965  More frequently in patients at high risk for Hepatitis C Hep C Antibody: Not on file        Diabetes Screening 1-2 times per year if you're at risk for diabetes or have pre-diabetes Fasting glucose: 99 mg/dL (1/18/2023)  A1C: No results in last 5 years (No results in last 5 years)      Cholesterol Screening Once every 5 years if you don't have a  lipid disorder. May order more often based on risk factors. Lipid panel: 01/18/2023         Other Preventive Screenings Covered by Medicare:  Abdominal Aortic Aneurysm (AAA) Screening: covered once if your at risk. You're considered to be at risk if you have a family history of AAA or a male between the age of 65-75 who smoking at least 100 cigarettes in your lifetime.  Lung Cancer Screening: covers low dose CT scan once per year if you meet all of the following conditions: (1) Age 55-77; (2) No signs or symptoms of lung cancer; (3) Current smoker or have quit smoking within the last 15 years; (4) You have a tobacco smoking history of at least 20 pack years (packs per day x number of years you smoked); (5) You get a written order from a healthcare provider.  Glaucoma Screening: covered annually if you're considered high risk: (1) You have diabetes OR (2) Family history of glaucoma OR (3)  aged 50 and older OR (4)  American aged 65 and older  Osteoporosis Screening: covered every 2 years if you meet one of the following conditions: (1) Have a vertebral abnormality; (2) On glucocorticoid therapy for more than 3 months; (3) Have primary hyperparathyroidism; (4) On osteoporosis medications and need to assess response to drug therapy.  HIV Screening: covered annually if you're between the age of 15-65. Also covered annually if you are younger than 15 and older than 65 with risk factors for HIV infection. For pregnant patients, it is covered up to 3 times per pregnancy.    Immunizations:  Immunization Recommendations   Influenza Vaccine Annual influenza vaccination during flu season is recommended for all persons aged >= 6 months who do not have contraindications   Pneumococcal Vaccine   * Pneumococcal conjugate vaccine = PCV13 (Prevnar 13), PCV15 (Vaxneuvance), PCV20 (Prevnar 20)  * Pneumococcal polysaccharide vaccine = PPSV23 (Pneumovax) Adults 19-65 yo with certain risk factors or if 65+ yo  If  never received any pneumonia vaccine: recommend Prevnar 20 (PCV20)  Give PCV20 if previously received 1 dose of PCV13 or PPSV23   Hepatitis B Vaccine 3 dose series if at intermediate or high risk (ex: diabetes, end stage renal disease, liver disease)   Respiratory syncytial virus (RSV) Vaccine - COVERED BY MEDICARE PART D  * RSVPreF3 (Arexvy) CDC recommends that adults 60 years of age and older may receive a single dose of RSV vaccine using shared clinical decision-making (SCDM)   Tetanus (Td) Vaccine - COST NOT COVERED BY MEDICARE PART B Following completion of primary series, a booster dose should be given every 10 years to maintain immunity against tetanus. Td may also be given as tetanus wound prophylaxis.   Tdap Vaccine - COST NOT COVERED BY MEDICARE PART B Recommended at least once for all adults. For pregnant patients, recommended with each pregnancy.   Shingles Vaccine (Shingrix) - COST NOT COVERED BY MEDICARE PART B  2 shot series recommended in those 19 years and older who have or will have weakened immune systems or those 50 years and older     Health Maintenance Due:      Topic Date Due   • Hepatitis C Screening  Never done   • HIV Screening  Never done   • Colorectal Cancer Screening  06/16/2026     Immunizations Due:      Topic Date Due   • Influenza Vaccine (1) 09/01/2023   • COVID-19 Vaccine (4 - 2023-24 season) 09/01/2023     Advance Directives   What are advance directives?  Advance directives are legal documents that state your wishes and plans for medical care. These plans are made ahead of time in case you lose your ability to make decisions for yourself. Advance directives can apply to any medical decision, such as the treatments you want, and if you want to donate organs.   What are the types of advance directives?  There are many types of advance directives, and each state has rules about how to use them. You may choose a combination of any of the following:  Living will:  This is a written  record of the treatment you want. You can also choose which treatments you do not want, which to limit, and which to stop at a certain time. This includes surgery, medicine, IV fluid, and tube feedings.   Durable power of  for healthcare (DPAHC):  This is a written record that states who you want to make healthcare choices for you when you are unable to make them for yourself. This person, called a proxy, is usually a family member or a friend. You may choose more than 1 proxy.  Do not resuscitate (DNR) order:  A DNR order is used in case your heart stops beating or you stop breathing. It is a request not to have certain forms of treatment, such as CPR. A DNR order may be included in other types of advance directives.  Medical directive:  This covers the care that you want if you are in a coma, near death, or unable to make decisions for yourself. You can list the treatments you want for each condition. Treatment may include pain medicine, surgery, blood transfusions, dialysis, IV or tube feedings, and a ventilator (breathing machine).  Values history:  This document has questions about your views, beliefs, and how you feel and think about life. This information can help others choose the care that you would choose.  Why are advance directives important?  An advance directive helps you control your care. Although spoken wishes may be used, it is better to have your wishes written down. Spoken wishes can be misunderstood, or not followed. Treatments may be given even if you do not want them. An advance directive may make it easier for your family to make difficult choices about your care.   Weight Management   Why it is important to manage your weight:  Being overweight increases your risk of health conditions such as heart disease, high blood pressure, type 2 diabetes, and certain types of cancer. It can also increase your risk for osteoarthritis, sleep apnea, and other respiratory problems. Aim for a slow,  steady weight loss. Even a small amount of weight loss can lower your risk of health problems.  How to lose weight safely:  A safe and healthy way to lose weight is to eat fewer calories and get regular exercise. You can lose up about 1 pound a week by decreasing the number of calories you eat by 500 calories each day.   Healthy meal plan for weight management:  A healthy meal plan includes a variety of foods, contains fewer calories, and helps you stay healthy. A healthy meal plan includes the following:  Eat whole-grain foods more often.  A healthy meal plan should contain fiber. Fiber is the part of grains, fruits, and vegetables that is not broken down by your body. Whole-grain foods are healthy and provide extra fiber in your diet. Some examples of whole-grain foods are whole-wheat breads and pastas, oatmeal, brown rice, and bulgur.  Eat a variety of vegetables every day.  Include dark, leafy greens such as spinach, kale, jesus greens, and mustard greens. Eat yellow and orange vegetables such as carrots, sweet potatoes, and winter squash.   Eat a variety of fruits every day.  Choose fresh or canned fruit (canned in its own juice or light syrup) instead of juice. Fruit juice has very little or no fiber.  Eat low-fat dairy foods.  Drink fat-free (skim) milk or 1% milk. Eat fat-free yogurt and low-fat cottage cheese. Try low-fat cheeses such as mozzarella and other reduced-fat cheeses.  Choose meat and other protein foods that are low in fat.  Choose beans or other legumes such as split peas or lentils. Choose fish, skinless poultry (chicken or turkey), or lean cuts of red meat (beef or pork). Before you cook meat or poultry, cut off any visible fat.   Use less fat and oil.  Try baking foods instead of frying them. Add less fat, such as margarine, sour cream, regular salad dressing and mayonnaise to foods. Eat fewer high-fat foods. Some examples of high-fat foods include french fries, doughnuts, ice cream, and  cakes.  Eat fewer sweets.  Limit foods and drinks that are high in sugar. This includes candy, cookies, regular soda, and sweetened drinks.  Exercise:  Exercise at least 30 minutes per day on most days of the week. Some examples of exercise include walking, biking, dancing, and swimming. You can also fit in more physical activity by taking the stairs instead of the elevator or parking farther away from stores. Ask your healthcare provider about the best exercise plan for you.      © Copyright [employer] 2018 Information is for End User's use only and may not be sold, redistributed or otherwise used for commercial purposes. All illustrations and images included in CareNotes® are the copyrighted property of [employer] Inc[employer] or [employer]

## 2024-02-02 ENCOUNTER — TELEPHONE (OUTPATIENT)
Age: 64
End: 2024-02-02

## 2024-02-02 NOTE — TELEPHONE ENCOUNTER
Marc Vazquez called to see if we can order him the same medication he got a few months ago for his Left left Sciatica. He states he took muscle relaxer and antiinflammatories that helped. He states that about 2 weeks ago it started bothering him again and the last few days he has been in horrible pain and struggling to walk on it.   He is also requesting an order for Physical therapy for the sciatic.    Patient is also requesting an A1C order be added to his labs to check his sugars. He states a few years back he was pre-diabetic and would like to check on if he is in range.     Please advise, thank you!

## 2024-02-05 ENCOUNTER — OFFICE VISIT (OUTPATIENT)
Dept: FAMILY MEDICINE CLINIC | Facility: CLINIC | Age: 64
End: 2024-02-05
Payer: COMMERCIAL

## 2024-02-05 VITALS
TEMPERATURE: 98 F | OXYGEN SATURATION: 97 % | HEIGHT: 72 IN | DIASTOLIC BLOOD PRESSURE: 90 MMHG | HEART RATE: 72 BPM | SYSTOLIC BLOOD PRESSURE: 130 MMHG | WEIGHT: 201 LBS | RESPIRATION RATE: 14 BRPM | BODY MASS INDEX: 27.22 KG/M2

## 2024-02-05 DIAGNOSIS — M54.32 SCIATICA WITHOUT BACK PAIN, LEFT: ICD-10-CM

## 2024-02-05 DIAGNOSIS — M54.16 LUMBAR RADICULOPATHY: Primary | ICD-10-CM

## 2024-02-05 DIAGNOSIS — R20.2 NUMBNESS AND TINGLING OF LEG: ICD-10-CM

## 2024-02-05 DIAGNOSIS — R20.0 NUMBNESS AND TINGLING OF LEG: ICD-10-CM

## 2024-02-05 DIAGNOSIS — M79.604 RIGHT LEG PAIN: ICD-10-CM

## 2024-02-05 PROCEDURE — 99214 OFFICE O/P EST MOD 30 MIN: CPT | Performed by: FAMILY MEDICINE

## 2024-02-05 RX ORDER — IBUPROFEN 600 MG/1
600 TABLET ORAL EVERY 6 HOURS PRN
Qty: 30 TABLET | Refills: 0 | Status: SHIPPED | OUTPATIENT
Start: 2024-02-05

## 2024-02-05 RX ORDER — GABAPENTIN 100 MG/1
100 CAPSULE ORAL 2 TIMES DAILY
Qty: 60 CAPSULE | Refills: 0 | Status: SHIPPED | OUTPATIENT
Start: 2024-02-05

## 2024-02-05 RX ORDER — CYCLOBENZAPRINE HCL 10 MG
10 TABLET ORAL
Qty: 30 TABLET | Refills: 0 | Status: SHIPPED | OUTPATIENT
Start: 2024-02-05

## 2024-02-05 NOTE — PROGRESS NOTES
Chief Complaint   Patient presents with   • Leg Pain        Patient ID: Marc Douglas is a 63 y.o. male.    Leg Pain   The incident occurred more than 1 week ago. The incident occurred at home. There was no injury mechanism. The pain is present in the left thigh. The quality of the pain is described as burning. The pain is at a severity of 7/10. The pain is moderate. The pain has been Constant since onset. Associated symptoms include numbness (L foot lateral aspect). Pertinent negatives include no inability to bear weight, loss of motion, loss of sensation, muscle weakness or tingling. The symptoms are aggravated by movement. He has tried nothing for the symptoms. The treatment provided no relief.   Prior similar symptoms 6 m ago -  went for PT x 3 at that time -  willing to try PT regularly now       The following portions of the patient's history were reviewed and updated as appropriate: allergies, current medications, past family history, past medical history, past social history, past surgical history and problem list.    Review of Systems   Constitutional: Negative.    Respiratory: Negative.     Gastrointestinal: Negative.    Genitourinary: Negative.    Musculoskeletal:  Negative for back pain, gait problem and joint swelling.        Intermittent stabbing R leg pain anterior aspect below the knee x 1 month    Neurological:  Positive for numbness (L foot lateral aspect). Negative for tingling.       Current Outpatient Medications   Medication Sig Dispense Refill   • buPROPion (WELLBUTRIN XL) 300 mg 24 hr tablet Take 300 mg by mouth every morning     • busPIRone (BUSPAR) 10 mg tablet Take 10 mg by mouth 2 (two) times a day     • Dupixent subcutaneous injection INJECT 1 SYRINGE UNDER THE SKIN EVERY 14 DAYS 4 mL 5   • esomeprazole (NexIUM) 20 mg capsule Take 1 capsule (20 mg total) by mouth 2 (two) times a day before meals 180 capsule 3   • Fluticasone Propionate (Xhance) 93 MCG/ACT EXHU 1 spray into each  nostril 2 (two) times a day 16 mL 5   • montelukast (SINGULAIR) 10 mg tablet take 1 tablet by mouth every morning 90 tablet 3   • Multiple Vitamin (MULTIVITAMIN) tablet Take 1 tablet by mouth daily     • Myrbetriq 25 MG TB24 take 1 tablet by mouth once daily at bedtime 30 tablet 3   • rOPINIRole (REQUIP) 2 mg tablet Take 1 tablet (2 mg total) by mouth daily at bedtime 90 tablet 3   • rosuvastatin (CRESTOR) 10 MG tablet Take 1 tablet (10 mg total) by mouth daily at bedtime 90 tablet 3   • traZODone (DESYREL) 100 mg tablet Take 100 mg by mouth daily at bedtime     • valACYclovir (VALTREX) 500 mg tablet Take 500 mg by mouth daily     • Ventolin  (90 Base) MCG/ACT inhaler Inhale 2 puffs every 4 (four) hours as needed for wheezing or shortness of breath FOUR Inahlers and 3 refills 72 g 3   • cyclobenzaprine (FLEXERIL) 10 mg tablet Take 1 tablet (10 mg total) by mouth daily at bedtime 30 tablet 0   • fluticasone-vilanterol (BREO ELLIPTA) 200-25 MCG/INH inhaler Inhale 1 puff daily Rinse mouth after use. (Patient not taking: Reported on 7/21/2023) 3 each 3   • gabapentin (Neurontin) 100 mg capsule Take 1 capsule (100 mg total) by mouth 2 (two) times a day 60 capsule 3   • ibuprofen (MOTRIN) 600 mg tablet Take 1 tablet (600 mg total) by mouth every 6 (six) hours as needed for moderate pain 30 tablet 0   • oxyCODONE-acetaminophen (Percocet) 5-325 mg per tablet Take 1 tablet by mouth every 4 (four) hours as needed for moderate pain Max Daily Amount: 6 tablets (Patient not taking: Reported on 1/24/2024) 39 tablet 0     No current facility-administered medications for this visit.       Objective:    /90 (BP Location: Left arm, Patient Position: Sitting, Cuff Size: Adult)   Pulse 72   Temp 98 °F (36.7 °C) (Temporal)   Resp 14   Ht 6' (1.829 m)   Wt 91.2 kg (201 lb)   SpO2 97%   BMI 27.26 kg/m²        Physical Exam  Constitutional:       General: He is not in acute distress.     Appearance: He is not  ill-appearing.   Musculoskeletal:         General: No swelling, tenderness, deformity or signs of injury.      Right lower leg: No edema.      Left lower leg: No edema.   Skin:     Findings: No rash.   Neurological:      Mental Status: He is alert and oriented to person, place, and time.      Cranial Nerves: No cranial nerve deficit.      Motor: No weakness.      Gait: Gait normal.   Psychiatric:         Mood and Affect: Mood normal.                 Assessment/Plan:         Diagnoses and all orders for this visit:    Lumbar radiculopathy  -     Ambulatory Referral to Physical Therapy; Future  -     cyclobenzaprine (FLEXERIL) 10 mg tablet; Take 1 tablet (10 mg total) by mouth daily at bedtime  -     gabapentin (Neurontin) 100 mg capsule; Take 1 capsule (100 mg total) by mouth 2 (two) times a day  -     ibuprofen (MOTRIN) 600 mg tablet; Take 1 tablet (600 mg total) by mouth every 6 (six) hours as needed for moderate pain    Numbness and tingling of leg    Right leg pain  -     XR tibia fibula 2 vw right; Future    Sciatica without back pain, left        Rto prhamlet Mcelroy MD

## 2024-02-06 ENCOUNTER — APPOINTMENT (OUTPATIENT)
Dept: RADIOLOGY | Facility: CLINIC | Age: 64
End: 2024-02-06
Payer: COMMERCIAL

## 2024-02-06 DIAGNOSIS — M79.604 RIGHT LEG PAIN: ICD-10-CM

## 2024-02-06 PROCEDURE — 73590 X-RAY EXAM OF LOWER LEG: CPT

## 2024-02-08 ENCOUNTER — APPOINTMENT (OUTPATIENT)
Dept: LAB | Facility: CLINIC | Age: 64
End: 2024-02-08
Payer: COMMERCIAL

## 2024-02-08 DIAGNOSIS — E78.00 HYPERCHOLESTEROLEMIA: ICD-10-CM

## 2024-02-08 DIAGNOSIS — L65.9 HAIR LOSS: ICD-10-CM

## 2024-02-08 LAB
ALBUMIN SERPL BCP-MCNC: 4.4 G/DL (ref 3.5–5)
ALP SERPL-CCNC: 67 U/L (ref 34–104)
ALT SERPL W P-5'-P-CCNC: 21 U/L (ref 7–52)
ANION GAP SERPL CALCULATED.3IONS-SCNC: 9 MMOL/L
AST SERPL W P-5'-P-CCNC: 27 U/L (ref 13–39)
BILIRUB SERPL-MCNC: 0.82 MG/DL (ref 0.2–1)
BUN SERPL-MCNC: 19 MG/DL (ref 5–25)
CALCIUM SERPL-MCNC: 8.9 MG/DL (ref 8.4–10.2)
CHLORIDE SERPL-SCNC: 104 MMOL/L (ref 96–108)
CHOLEST SERPL-MCNC: 155 MG/DL
CO2 SERPL-SCNC: 28 MMOL/L (ref 21–32)
CREAT SERPL-MCNC: 1.1 MG/DL (ref 0.6–1.3)
GFR SERPL CREATININE-BSD FRML MDRD: 72 ML/MIN/1.73SQ M
GLUCOSE P FAST SERPL-MCNC: 80 MG/DL (ref 65–99)
HDLC SERPL-MCNC: 42 MG/DL
LDLC SERPL CALC-MCNC: 97 MG/DL (ref 0–100)
NONHDLC SERPL-MCNC: 113 MG/DL
POTASSIUM SERPL-SCNC: 3.8 MMOL/L (ref 3.5–5.3)
PROT SERPL-MCNC: 6.7 G/DL (ref 6.4–8.4)
SODIUM SERPL-SCNC: 141 MMOL/L (ref 135–147)
TRIGL SERPL-MCNC: 79 MG/DL
TSH SERPL DL<=0.05 MIU/L-ACNC: 1.28 UIU/ML (ref 0.45–4.5)

## 2024-02-08 PROCEDURE — 84443 ASSAY THYROID STIM HORMONE: CPT

## 2024-02-08 PROCEDURE — 80053 COMPREHEN METABOLIC PANEL: CPT

## 2024-02-08 PROCEDURE — 80061 LIPID PANEL: CPT

## 2024-02-08 PROCEDURE — 36415 COLL VENOUS BLD VENIPUNCTURE: CPT

## 2024-02-15 ENCOUNTER — EVALUATION (OUTPATIENT)
Dept: PHYSICAL THERAPY | Facility: CLINIC | Age: 64
End: 2024-02-15
Payer: COMMERCIAL

## 2024-02-15 DIAGNOSIS — M79.605 LEFT LEG PAIN: ICD-10-CM

## 2024-02-15 DIAGNOSIS — M54.16 LUMBAR RADICULOPATHY: Primary | ICD-10-CM

## 2024-02-15 PROCEDURE — 97162 PT EVAL MOD COMPLEX 30 MIN: CPT

## 2024-02-15 PROCEDURE — 97110 THERAPEUTIC EXERCISES: CPT

## 2024-02-15 NOTE — PROGRESS NOTES
PT Evaluation     Today's date: 2/15/2024  Patient name: Marc Douglas  : 1960  MRN: [id number]  Referring provider: Loren Mcelroy MD  Dx:   Encounter Diagnosis     ICD-10-CM    1. Lumbar radiculopathy  M54.16 Ambulatory Referral to Physical Therapy      2. Left leg pain  M79.605           Start Time: 1400  Stop Time: 1445  Total time in clinic (min): 45 minutes    Assessment  Assessment details: Marc Douglas is a 63 y.o. male who presents with L posterior LE radicular pain. Pt demonstrates impaired LLE S1- S2 myotomal strength, LLE S1 sensation, lumbar AROM, and bilateral hamstring flexibility. Due to these impairments, patient has difficulty sitting for prolonged periods, bending, and lifting affecting his ability to drive, participate in exercises at gym, and complete ADLs involving retrieving/ lifting items into cabinets. Patient's clinical presentation is consistent with their referring diagnosis of Lumbar radiculopathy. Patient has been educated in spine anatomy, importance of HEP/ to discontinue if symptoms increase/peripheralization, and use of lumbar roll. Pt educated on pathology, review of impairements, prognosis, activity modification, POC, and HEP. Patient would benefit from skilled physical therapy services to address their aforementioned functional limitations and progress towards prior level of function and independence with home exercise program and self symptom management/resolution.     Plan: Ambulatory Referral to Physical Therapy  Impairments: abnormal or restricted ROM, activity intolerance, impaired physical strength, lacks appropriate home exercise program, pain with function, weight-bearing intolerance, poor posture  and poor body mechanics  Other impairment: poor tolerance to prolonged static positions  Functional limitations: limited tolerance to sitting/driving/bendingUnderstanding of Dx/Px/POC: good   Prognosis: good    Goals  Short Term Goals:  Target Date  3/14/2024  1. Initiate and advance HEP to demo active participation in recovery.    2. Improve postural awareness and demonstrate self postural correction t/o full PT session w/out VC.   3. Improve AROM lumbar spine to min steward or better t/o for improved tolerance to bending/ reaching.   4. Pt to tolerate prolonged sitting/standing x 1 hour or more w/o c/o pain to return to pain-free driving.       Long Term Goals:  Target Date 5/9/2024  1. Indep with HEP and self symptom prevention for self symptom reduction/resolution   2. Achieve lumbar AROM to WNL w/o c/o pain for improved functional mobility w/ bending and lifting.   3. Improve LLE strength to 5/5 to improve gait mechanics necessary for safe ambulation in community.   3. Improve LE/core strength to good to allow pt to tolerate bending and lifting/carrying 20# x 10/1' w/o c/o pain for return to lifting in home/ gym.  4. Resolve L foot numbness for improved sensation/ balance with community ambulation.    Plan  Plan details: HEP development and progression, monitor patients adherence to activity modification to ensure adequate healing time/ recovery. Implement stretching, A/AA/PROM, joint mobilizations, posture education, STM/MI as needed to reduce muscle tension, muscle reeducation. Progress strengthening; improve pt's tolerance to resisted WB activities. PLOC discussed and agreed upon with patient.   Patient would benefit from: skilled PT and PT eval  Planned modality interventions: thermotherapy: hydrocollator packs and unattended electrical stimulation  Planned therapy interventions: joint mobilization, patient education, postural training, abdominal trunk stabilization, functional ROM exercises, home exercise program, neuromuscular re-education, strengthening, stretching, therapeutic activities and therapeutic exercise  Other planned therapy interventions: mechanical assessment  Frequency: 2-3x/week.  Duration in weeks: 12  Plan of Care beginning date:  2/15/2024  Plan of Care expiration date: 2024  Treatment plan discussed with: patient        Subjective Evaluation    History of Present Illness  Mechanism of injury: Pt is a 63 y.o male who reports to PT with complaint of L posterior thigh pain. Recent exacerbation began 3 weeks ago. Pain is in medial posterior thigh. Pain does not go into back. Pain typically does not go past knee; occasionally does go into calf. Noticed that calf pain is present with leg extensions at gym. First time this happened was in 2023; pain has resolved with time however, numbness into the foot remains. Onset in 2023 began with driving. Numbness is along lateral aspect of foot, yet the whole foot feels numb. Occasional tingling into foot. Pain in thigh is sharp and dull; described as annoying pain. Pt is taking gabapentin and acetaminophen to manage condition current; was prescribed gabapentin for initial pain as well. Cannot point to one thing specifically causing pain; he wonders if knee exercises in gym affected it. He goes to the gym regularly; stopped end of , he started going back in December. Aggravating factors are sitting. Walking is not painful. He has been doing cold and hot application and self massage which has given relief. He did have slight back pain but does not think it is correlated. Has not had an MRI of back or thigh. First time it happened he went to his primary care doctor. He cannot carry things, lift heavy or sit for prolonged periods. Denies bowel/ bladder change with onset of symptoms. States that oxycodone he was taking at one point did affect his bowels. Sitting. Denies significant medical hx.   Patient Goals  Patient goals for therapy: decreased pain    Pain  Current pain ratin  At best pain ratin (Sleep)  At worst pain ratin    Social Support  Steps to enter house: yes  5  Stairs in house: yes     Treatments  Previous treatment: physical therapy and  medication        Objective  POSTURE:  2/15/2024: Standing posture demonstrates increased thoracic kyphosis and decreased lumbar lordosis; no gross lateral shift noted. Posture correction in stting NE on symptoms.     Gait:   2/15/2024: Antalgic.     SPECIAL TESTS     2/15/2024  SLR supine:   (- R/+ L)  Crossed SLR:   (- R/- L)  Slump test:   (-R/+L)    DERMATOMAL TESTIN/15/2024     Light touch  L2 anterior thigh:  Intact B/L  L3 medial knee:  Intact B/L  L4 medial maleolus:  Intact B/L  L5 1st web space:  Intact B/L  S1 lateral/sole foot:  Intact R; Impaired L  S2 poster calf:  Intact B/L    MYOTOMAL TESTIN/15/2024 2/15/2024     Right  Left  L2-3 Hip flexion:    5/  L3-4 Knee extension:    5/*  L5 Great toe exten:    5/  L4 Heel walk/DF:    5  S1 toe walk/PF  /*  S2 knee flex:     4/5*    REFLEXES: 0= none; 1+ = slight; 2+ = brisk/normal; 3+= very brisk; 4+= clonus     2/15/2024  L3-4 Quadriceps:  2+ B/L  L5-S1 Achilles:  2+ B/L     LUMBAR AROM: 2/15/2024  Flexion   Mod steward  Extension  Min steward  R sideglide  mod steward  L sideglide  mod steward    MECHANICAL ASSESSMENT:   2/15/2024: pre-test findings include 6/10 L hamstring pain  Repeated extension in lying (REIL) 2x10 = centralizing - pt reports decreased numbness in L foot, pain in posterior thigh NE  Repeated flexion in sitting 1x10 = NE    FUNCTION:  2/15/2024:    Pain with prolonged sitting; pain w/ driving   No issue w/ walking or sleep   Unable to resume gym exercises w/ current pain    FLEXIBILITY:  2/15/2024:  Hamstring flexibility R 50 deg,  L 30 deg,          Pirif flexibility R min steward;  L max steward       Precautions:   Past Medical History:   Diagnosis Date    Asthma     Hyperlipidemia     Nasal polyps     Near syncope     Sleep apnea, obstructive      Past Surgical History:   Procedure Laterality Date    ARTHROSCOPY KNEE      COLONOSCOPY      [place] SURGICAL ARTHROSCOPY SHOULDER REMOVAL LOOSE/FB Right 2018     Procedure: ARTHROSCOPIC SHOULDER;  Surgeon: Mickey Gaytan MD;  Location: [facility];  Service: Orthopedics    [place] SURGICAL ARTHROSCOPY SHOULDER W/ROTATOR CUFF RPR Right 12/6/2018    Procedure: REPAIR ROTATOR CUFF  ARTHROSCOPIC WITH POSSIBLE REMOVAL LOOSE BODIES AND POSSIBLE DECOMPRESSION;  Surgeon: Mickey Gaytan MD;  Location: [facility];  Service: Orthopedics    SINUS SURGERY         SOC: 2/15/2024  FOTO: 2/15/2024  POC Expiration: 5/9/2024   Daily Treatment Log  Date: Initial Evaluation Next session         Visit#/ auth: 1           Objective Measures             Continued mechanical assessment    Perform         Manuals                                                                     Neuro Re-Ed                                                                                                               Ther Ex  10'           Treadmill        ANIBAL        REIL  2 x 10           Prone knee flex             Prone hip ext        Supine TA set             SAQ w/ DF (depending)                                                       EDUCATION: Pathology, review of impairements, prognosis, activity modification, POC, and HEP KE + Pt was educated in spine anatomy, importance of HEP/ to discontinue if increase/peripheralization  of pain, and use of lumbar roll.            Ther Activity                                         Gait Training                                         Modalities                                         HEP:   Access Code: [email or web address]  URL: [email or web address]  Date: 02/15/2024  Prepared by: Ivory Steele    Exercises  - Prone Press Up  - 4-5 x daily - 1 sets - 10 reps

## 2024-02-20 ENCOUNTER — OFFICE VISIT (OUTPATIENT)
Dept: PHYSICAL THERAPY | Facility: CLINIC | Age: 64
End: 2024-02-20
Payer: COMMERCIAL

## 2024-02-20 DIAGNOSIS — M54.16 LUMBAR RADICULOPATHY: Primary | ICD-10-CM

## 2024-02-20 DIAGNOSIS — M79.605 LEFT LEG PAIN: ICD-10-CM

## 2024-02-20 PROCEDURE — 97110 THERAPEUTIC EXERCISES: CPT

## 2024-02-20 PROCEDURE — 97112 NEUROMUSCULAR REEDUCATION: CPT

## 2024-02-20 NOTE — PROGRESS NOTES
"Daily Note     Today's date: 2024  Patient name: Marc Douglas  : 1960  MRN: [id number]  Referring provider: Loren Mcelroy MD  Dx:   Encounter Diagnosis     ICD-10-CM    1. Lumbar radiculopathy  M54.16       2. Left leg pain  M79.605                      Subjective: pt reports he has more pain today L post thigh 7-8/10 on arrival. The numbness in L foot continues w/o change. Pt reports he did  REIL 2x10 1x the other day and that was it, he felt it might have been a little worse, unsure but it was not better.       Objective: See treatment diary below  Upon observation, pt's \"REIL\" was ANIBAL with some additional lumbar ext and was not moving into end range lumbar ext.     REIL 2x10= inc/W solid 8/10 post thigh pain   L SGIS 2x10= centralizing dec area of pain moving towards buttock but same intensity.         Assessment: Tolerated treatment well. Pt dem centralizing of L post thigh pain with more intense pain closer towards his buttock vs lower in his thigh, pt HEP updated for L SGIS. Edu on importance of end range movement and frequency every 2hrs to cont to assess response to repeated movements. Patient would benefit from continued PT      Plan: Continue per plan of care.      Precautions:   Past Medical History:   Diagnosis Date    Asthma     Hyperlipidemia     Nasal polyps     Near syncope     Sleep apnea, obstructive      Past Surgical History:   Procedure Laterality Date    ARTHROSCOPY KNEE      COLONOSCOPY      [place] SURGICAL ARTHROSCOPY SHOULDER REMOVAL LOOSE/FB Right 2018    Procedure: ARTHROSCOPIC SHOULDER;  Surgeon: Mickey Gaytan MD;  Location: [facility];  Service: Orthopedics    [place] SURGICAL ARTHROSCOPY SHOULDER W/ROTATOR CUFF RPR Right 2018    Procedure: REPAIR ROTATOR CUFF  ARTHROSCOPIC WITH POSSIBLE REMOVAL LOOSE BODIES AND POSSIBLE DECOMPRESSION;  Surgeon: Mickey Gaytan MD;  Location: [facility];  Service: Orthopedics    SINUS SURGERY         SOC: " 2/15/2024  FOTO: 2/15/2024  POC Expiration: 5/9/2024   Daily Treatment Log  Date: Initial Evaluation 2/20/2024         Visit#/ auth: 1  2         Objective Measures             Continued mechanical assessment            Manuals              prone PA mobs lumbar    RB                                                    Neuro Re-Ed    25'           REIL    2x10          repeated L SGIS    2x10; 1x10                                                                                Ther Ex  10' 20'         Treadmill        ANIBAL                          Prone knee flex    ANIBAL w/ B/L knee flex 10x; 10 after manual          Prone hip ext        Supine TA set             SAQ w/ DF (depending)                                                       EDUCATION: Pathology, review of impairements, prognosis, activity modification, POC, and HEP KE + Pt was educated in spine anatomy, importance of HEP/ to discontinue if increase/peripheralization  of pain, and use of lumbar roll.   pt edu importantance of end range movement and freq of L SGIS          Ther Activity                                         Gait Training                                         Modalities                                         HEP:   Access Code: [email or web address]  URL: [email or web address]  Date: 02/20/2024  Prepared by: Edith Dyer    Exercises  - Right Standing Lateral Shift Correction at Wall - Repetitions  - 5-6 x daily - 7 x weekly - 2 sets - 10 reps - 5sec hold

## 2024-02-22 ENCOUNTER — OFFICE VISIT (OUTPATIENT)
Dept: PHYSICAL THERAPY | Facility: CLINIC | Age: 64
End: 2024-02-22
Payer: COMMERCIAL

## 2024-02-22 DIAGNOSIS — M54.16 LUMBAR RADICULOPATHY: Primary | ICD-10-CM

## 2024-02-22 DIAGNOSIS — M79.605 LEFT LEG PAIN: ICD-10-CM

## 2024-02-22 PROCEDURE — 97112 NEUROMUSCULAR REEDUCATION: CPT

## 2024-02-22 PROCEDURE — 97140 MANUAL THERAPY 1/> REGIONS: CPT

## 2024-02-22 PROCEDURE — 97110 THERAPEUTIC EXERCISES: CPT

## 2024-02-22 NOTE — PROGRESS NOTES
Daily Note     Today's date: 2024  Patient name: Marc Douglas  : 1960  MRN: [id number]  Referring provider: Loren Mcelroy MD  Dx:   Encounter Diagnosis     ICD-10-CM    1. Lumbar radiculopathy  M54.16       2. Left leg pain  M79.605                      Subjective: pt reports that when he does the side glides he feels more intense pain in the upper HS with no change to the rest of the pain in distal HS, does not feel they are making a beneficial change at this time. Reports in the past he has gotten relief from heat/cold and inquired about this. Pain in posterior L HS on arrival to session 6/10 after taking ibuprofen it was worse this morning.       Objective: See treatment diary below  Manual DKTC w/ OP=NE   Supine flex rotation to L=NE      Assessment: Tolerated treatment well.  Pt tolerated additional NG and stretching w/o complaint. Trial of TENS/MH dec pain 4/10 in post L HS. Pt edu despite his improvement in symptoms with MH/TENS to L HS he likely does have a lumbar component in addition as the previous repeated movements did increase his symptoms, if there was no back component his pain would not have changed. Would benefit from cont mechanical assessment to rule in or out in combo with localized HS/hip mobility/strengthening. Patient would benefit from continued PT      Plan: Continue per plan of care.      Precautions:   Past Medical History:   Diagnosis Date    Asthma     Hyperlipidemia     Nasal polyps     Near syncope     Sleep apnea, obstructive      Past Surgical History:   Procedure Laterality Date    ARTHROSCOPY KNEE      COLONOSCOPY      [place] SURGICAL ARTHROSCOPY SHOULDER REMOVAL LOOSE/FB Right 2018    Procedure: ARTHROSCOPIC SHOULDER;  Surgeon: Mickey Gaytan MD;  Location: [facility];  Service: Orthopedics    [place] SURGICAL ARTHROSCOPY SHOULDER W/ROTATOR CUFF RPR Right 2018    Procedure: REPAIR ROTATOR CUFF  ARTHROSCOPIC WITH POSSIBLE REMOVAL LOOSE BODIES AND  "POSSIBLE DECOMPRESSION;  Surgeon: Mickey Gaytan MD;  Location: [facility];  Service: Orthopedics    SINUS SURGERY         SOC: 2/15/2024  FOTO: 2/15/2024  POC Expiration: 5/9/2024   Daily Treatment Log  Date: Initial Evaluation 2/20/2024 2/22/2024       Visit#/ auth: 1  2  3        Objective Measures             Continued mechanical assessment            Manuals      10'         prone PA mobs lumbar    RB          DKTC w/ OP      RB   1x10=NE         supine flex rotation to L      1x10=NE                      Neuro Re-Ed    25'  15'         REIL    2x10 HOLD         repeated L SGIS    2x10; 1x10  HOLD         supine sciatic NG w/ sos      15x R/L         supine figure 4 stretch      20\"x4 R/L                                                  Ther Ex  10' 20'  15'        Treadmill        ANIBAL                          Prone knee flex    ANIBAL w/ B/L knee flex 10x; 10 after manual          Prone hip ext        Supine TA set             SAQ w/ DF (depending)                                                       EDUCATION: Pathology, review of impairements, prognosis, activity modification, POC, and HEP KE + Pt was educated in spine anatomy, importance of HEP/ to discontinue if increase/peripheralization  of pain, and use of lumbar roll.   pt edu importantance of end range movement and freq of L SGIS   pt edu        Ther Activity                                         Gait Training                                         Modalities              Prone MH/TENS post L HS      10' skin intact pre and post                      HEP:   Access Code: [email or web address]  URL: [email or web address]  Date: 02/22/2024  Prepared by: Edith Dyer    Exercises  - Supine Sciatic Nerve Glide  - 1 x daily - 7 x weekly - 2 sets - 10 reps  - Supine Figure 4 Piriformis Stretch  - 1 x daily - 7 x weekly - 4 reps - 20sec hold         "

## 2024-02-23 ENCOUNTER — APPOINTMENT (OUTPATIENT)
Dept: PHYSICAL THERAPY | Facility: CLINIC | Age: 64
End: 2024-02-23
Payer: COMMERCIAL

## 2024-02-26 ENCOUNTER — OFFICE VISIT (OUTPATIENT)
Dept: PHYSICAL THERAPY | Facility: CLINIC | Age: 64
End: 2024-02-26
Payer: COMMERCIAL

## 2024-02-26 DIAGNOSIS — M54.16 LUMBAR RADICULOPATHY: Primary | ICD-10-CM

## 2024-02-26 DIAGNOSIS — M79.605 LEFT LEG PAIN: ICD-10-CM

## 2024-02-26 PROCEDURE — 97110 THERAPEUTIC EXERCISES: CPT

## 2024-02-26 PROCEDURE — 97112 NEUROMUSCULAR REEDUCATION: CPT

## 2024-02-26 NOTE — PROGRESS NOTES
Daily Note     Today's date: 2024  Patient name: Marc Douglas  : 1960  MRN: [id number]  Referring provider: Loren Mcelroy MD  Dx:   Encounter Diagnosis     ICD-10-CM    1. Lumbar radiculopathy  M54.16       2. Left leg pain  M79.605                      Subjective: pt reports that the whole weekend it was horrible and started sat morning, he was taking ibu. He was no performing SGIS, he did some of the stretches. He did try some lumbar flex. On arrival to session 8/10 in L HS. Reports he cont to only be able to sit for 30 min or so before his HS hurts more, if he extends his knee and props his foot up this does improve his symptoms.       Objective: See treatment diary below  Pre-test symptoms 6/10 L HS   EUN 1x10= Dec/B Pt hesitant to say it feels better but does think it improved his symptoms.       Assessment: Tolerated treatment well. Post ANIBAL TENS/MH dec pain 6/10 with stiffness in LB. Pt edu to perform EUN every 2hrs and to avoid repeated flex activities (fwd bending, ab machine at the gym etc). Pt edu on the importance of end range movement and frequency of this movement to be able to assess if this is the correct direction for symptom management. Extensive time spent on edu on radicular pain vs local pain; disc mechanics; repeated movement responses. Patient would benefit from continued PT      Plan: Continue per plan of care.      Precautions:   Past Medical History:   Diagnosis Date    Asthma     Hyperlipidemia     Nasal polyps     Near syncope     Sleep apnea, obstructive      Past Surgical History:   Procedure Laterality Date    ARTHROSCOPY KNEE      COLONOSCOPY      [place] SURGICAL ARTHROSCOPY SHOULDER REMOVAL LOOSE/FB Right 2018    Procedure: ARTHROSCOPIC SHOULDER;  Surgeon: Mickey Gaytan MD;  Location: [facility];  Service: Orthopedics    [place] SURGICAL ARTHROSCOPY SHOULDER W/ROTATOR CUFF RPR Right 2018    Procedure: REPAIR ROTATOR CUFF  ARTHROSCOPIC WITH POSSIBLE  "REMOVAL LOOSE BODIES AND POSSIBLE DECOMPRESSION;  Surgeon: Mickey Gaytan MD;  Location: [facility];  Service: Orthopedics    SINUS SURGERY         SOC: 2/15/2024  FOTO: 2/15/2024  POC Expiration: 5/9/2024   Daily Treatment Log  Date: Initial Evaluation 2/20/2024 2/22/2024 2/26/2024     Visit#/ auth: 1  2  3   4     Objective Measures             Continued mechanical assessment            Manuals      10'         prone PA mobs lumbar    RB          DKTC w/ OP      RB   1x10=NE         supine flex rotation to L      1x10=NE                      Neuro Re-Ed    25'  15'  20'       REIL    2x10 HOLD   EUN 1x10   EUN @ wall 1x5       repeated L SGIS    2x10; 1x10  HOLD         supine sciatic NG w/ sos      15x R/L   15x R/L       supine figure 4 stretch      20\"x4 R/L                                                  Ther Ex  10' 20'  15'        Treadmill        ANIBAL                          Prone knee flex    ANIBAL w/ B/L knee flex 10x; 10 after manual          Prone hip ext        Supine TA set             SAQ w/ DF (depending)                                                       EDUCATION: Pathology, review of impairements, prognosis, activity modification, POC, and HEP KE + Pt was educated in spine anatomy, importance of HEP/ to discontinue if increase/peripheralization  of pain, and use of lumbar roll.   pt edu importantance of end range movement and freq of L SGIS   pt edu  20' see assessment      Ther Activity                                         Gait Training                                         Modalities              Prone MH/TENS post L HS      10' skin intact pre and post   10' skin intact pre and post                     HEP:   Access Code: [email or web address]  URL: [email or web address]  Date: 02/22/2024  Prepared by: Edith Dyer    Exercises  - Supine Sciatic Nerve Glide  - 1 x daily - 7 x weekly - 2 sets - 10 reps  - Supine Figure 4 Piriformis Stretch  - 1 x daily - 7 x weekly - 4 reps - " 20sec hold

## 2024-02-28 ENCOUNTER — OFFICE VISIT (OUTPATIENT)
Dept: PHYSICAL THERAPY | Facility: CLINIC | Age: 64
End: 2024-02-28
Payer: COMMERCIAL

## 2024-02-28 DIAGNOSIS — M79.605 LEFT LEG PAIN: ICD-10-CM

## 2024-02-28 DIAGNOSIS — M54.16 LUMBAR RADICULOPATHY: Primary | ICD-10-CM

## 2024-02-28 PROCEDURE — 97110 THERAPEUTIC EXERCISES: CPT

## 2024-02-28 PROCEDURE — 97140 MANUAL THERAPY 1/> REGIONS: CPT

## 2024-02-28 NOTE — PROGRESS NOTES
Daily Note     Today's date: 2024  Patient name: Marc Douglas  : 1960  MRN: [id number]  Referring provider: Loren Mcelroy MD  Dx:   Encounter Diagnosis     ICD-10-CM    1. Lumbar radiculopathy  M54.16       2. Left leg pain  M79.605                      Subjective: Pt reports 6.5/10 pain in L hamstring. Pain in buttock present since Monday night. Pt states that toward end of day he was not able to do full 10 reps of repeated extension at the wall. He incorporated it throughout his workout routine at gym. Tuesday did one set of EUN in the morning and 5-6 sets in the afternoon; later that night he though he should do another set; he tried to fall asleep. Had to take Advil due to pain in hamstring. Pain is described as dull pain typically. Tuesday night it was throbbing pain. Pain is worse at night and in the morning when he wakes up. Denies changes in bowel and bladder function; denies numbness around perianal region. Pt does have numbness along outer portion of L foot. Pt states that he has been going to gym; he only does upper body and tries not to strain his back in anyway. Pt coughing during subjective hx taking; when asked by PT pt states that coughing occurs in the morning for as long as he can remember; with further questioning; pt states that it is only during allergy season. Pt is a smoker; 2 cigarettes a day. Pt reports performing a treadmill workout each time he goes to the gym which consists of a brief period of incline walk (15%), 3.5 mph walking w/out incline, and then a 6.5 mph run to finish the half mile. Pt informed PT that he also trialed a heavy weighted leg press Tuesday night.       Objective: See treatment diary below    FADER (+)  SI compression (-)  SI distraction (+)    Palpation  Pt denies TTP along lumbar spinous process and bilateral SI joint. However, pt reports occasional pain along iliac crest and L SI joint.     Leg length:   True (ASIS to medial malleolus) =  R 37 in, L 36.5 in  Apparent (Umbilicus to medial malleolus) = 40.5 in B/L     Assessment: Pt instructed to inform PCP of cough. Pt educated on the importance of adherence to HEP/ activity modification for progress in regard to determining route cause of pain along LLE posterior chain. Informed pt that due to remaining gait impairments w/ ambulation on flat ground and presenting symptoms, walking on incline and long strides on treadmill will most likely exacerbate pain. Activity modifications to implement post session dicussed; pt instructed to discontinue previous HEP until next session. Pt with positive SI joint dysfunction testing. Continue to assess SI joint further next session. Pt with report of pain post session d/t provocative testing and potential shearing of SI joint; pt informed that with activity modification PT will have more information regarding SI involvement. Tolerated treatment well. Patient would benefit from continued PT.      Plan: Continue per plan of care.  Progress treatment as tolerated.       Precautions:   Past Medical History:   Diagnosis Date    Asthma     Hyperlipidemia     Nasal polyps     Near syncope     Sleep apnea, obstructive      Past Surgical History:   Procedure Laterality Date    ARTHROSCOPY KNEE      COLONOSCOPY      [place] SURGICAL ARTHROSCOPY SHOULDER REMOVAL LOOSE/FB Right 12/6/2018    Procedure: ARTHROSCOPIC SHOULDER;  Surgeon: Mickey Gaytan MD;  Location: [facility] OR;  Service: Orthopedics    [place] SURGICAL ARTHROSCOPY SHOULDER W/ROTATOR CUFF RPR Right 12/6/2018    Procedure: REPAIR ROTATOR CUFF  ARTHROSCOPIC WITH POSSIBLE REMOVAL LOOSE BODIES AND POSSIBLE DECOMPRESSION;  Surgeon: Mickey Gaytan MD;  Location: [facility] OR;  Service: Orthopedics    SINUS SURGERY         SOC: 2/15/2024  FOTO: 2/15/2024  POC Expiration: 5/9/2024   Daily Treatment Log  Date: Initial Evaluation 2/20/2024 2/22/2024 2/26/2024 2/28/2024   Visit#/ auth: 1  2  3   4  5   Objective Measures       "       Continued mechanical assessment            SI objective measures     KE   Manuals      10'     35'   prone PA mobs lumbar    RB          DKTC w/ OP      RB   1x10=NE         supine flex rotation to L      1x10=NE        Objective measures          KE; see objective section (SI joint)   Neuro Re-Ed    25'  15'  20'   5'    REIL    2x10 HOLD   EUN 1x10   EUN @ wall 1x5       repeated L SGIS    2x10; 1x10  HOLD         supine sciatic NG w/ sos      15x R/L   15x R/L   1 x 15 R/L seated NG    supine figure 4 stretch      20\"x4 R/L                                                  Ther Ex  10' 20'  15'     5'   Treadmill        ANIBAL        Piriformis stretch       30\" x 2 R/L            Prone knee flex    ANIBAL w/ B/L knee flex 10x; 10 after manual          Prone hip ext        Supine TA set             SAQ w/ DF (depending)                                                      EDUCATION: Pathology, review of impairements, prognosis, activity modification, POC, and HEP KE + Pt was educated in spine anatomy, importance of HEP/ to discontinue if increase/peripheralization  of pain, and use of lumbar roll.   pt edu importantance of end range movement and freq of L SGIS   pt edu  20' see assessment      Ther Activity                                         Gait Training                                         Modalities              Prone MH/TENS post L HS      10' skin intact pre and post   10' skin intact pre and post                     HEP:   Access Code: [email or web address]  2/28/2024: see assessment; instructed to discontinue HEP and implement activity modification techniques.             "

## 2024-03-04 ENCOUNTER — OFFICE VISIT (OUTPATIENT)
Dept: PHYSICAL THERAPY | Facility: CLINIC | Age: 64
End: 2024-03-04
Payer: COMMERCIAL

## 2024-03-04 DIAGNOSIS — M54.16 LUMBAR RADICULOPATHY: Primary | ICD-10-CM

## 2024-03-04 DIAGNOSIS — M79.605 LEFT LEG PAIN: ICD-10-CM

## 2024-03-04 PROCEDURE — 97110 THERAPEUTIC EXERCISES: CPT

## 2024-03-04 PROCEDURE — 97014 ELECTRIC STIMULATION THERAPY: CPT

## 2024-03-04 NOTE — PROGRESS NOTES
Daily Note     Today's date: 3/4/2024  Patient name: Marc Douglas  : 1960  MRN: [id number]  Referring provider: Mickey Gaytan*  Dx:   Encounter Diagnosis     ICD-10-CM    1. Lumbar radiculopathy  M54.16       2. Left leg pain  M79.605                      Subjective: Pt states that he went to the gym Friday and Saturday for a half hour. He did not run; he walked at 2 proximal hamstring most prominently on arrival today. Pt states that he still has numbness in his left foot, however, that was present from his last injury; numbness never went away.  He has never had imaging. Pt states that the only thing that provides relief is hot and cold pack. States that if he had to choose one, he thinks heat feels best. Pt reports 6.5/10 pain today.       Objective: See treatment diary below      Assessment: Implemented pelvic tilts and TA sets to initiate core strengthening today. Focused on RLE hip flexor strengthening and LLE ham/ glute strengthening and hip flexor stretching today. Pt with limited tolerance to hamstring stretching/ strengthening due to irritability of L posterior thigh. Pt with report of comfort in supine w/ LLE off edge for hip flexor stretch. Implemented E-stim and MH per patient request d/t pain relief with this modality. Tolerated treatment well. Patient would benefit from continued PT.       Plan: Continue per plan of care.  Progress treatment as tolerated.       Precautions:   Past Medical History:   Diagnosis Date    Asthma     Hyperlipidemia     Nasal polyps     Near syncope     Sleep apnea, obstructive      Past Surgical History:   Procedure Laterality Date    ARTHROSCOPY KNEE      COLONOSCOPY      [place] SURGICAL ARTHROSCOPY SHOULDER REMOVAL LOOSE/FB Right 2018    Procedure: ARTHROSCOPIC SHOULDER;  Surgeon: Mickey Gaytan MD;  Location: [facility];  Service: Orthopedics    [place] SURGICAL ARTHROSCOPY SHOULDER W/ROTATOR CUFF RPR Right 2018    Procedure: REPAIR ROTATOR  "CUFF  ARTHROSCOPIC WITH POSSIBLE REMOVAL LOOSE BODIES AND POSSIBLE DECOMPRESSION;  Surgeon: Mickey Gaytan MD;  Location: [facility];  Service: Orthopedics    SINUS SURGERY         SOC: 2/15/2024  FOTO: 2/15/2024  POC Expiration: 5/9/2024   Daily Treatment Log  Date: 3/4/2024 Next session  Needs FOTO  2/22/2024 2/26/2024 2/28/2024   Visit#/ auth: 6   3   4  5   Objective Measures           Continued mechanical assessment           SI objective measures     KE   Manuals    10'     35'   DKTC w/ OP    RB   1x10=NE         supine flex rotation to L    1x10=NE        Objective measures        KE; see objective section (SI joint)   Neuro Re-Ed 5'  15'  20'   5'    REIL    HOLD   EUN 1x10   EUN @ wall 1x5       repeated L SGIS    HOLD         supine sciatic NG w/ sos  R/L 1 x 10 seated NG  15x R/L   15x R/L   1 x 15 R/L seated NG   supine figure 4 stretch    20\"x4 R/L                                            Ther Ex 30'   15'     5'   Treadmill 5' 1.5 mph       Hip flexor stretch L w/ pelvic tilt 30\" x 3       Piriformis stretch       30\" x 2 R/L    Knee to chest w/ physio 1 x 15 L       Posterior pelvic tilt 5\" x 10       TA set 5\" x 10        Prone knee flex           Prone hip ext        Supine TA set w/ RLE march 1 x 10           Hamstring iso  L 3\" x 10        SAQ w/ DF (depending)                                              EDUCATION: Pathology, review of impairements, prognosis, activity modification, POC, and HEP    pt edu  20' see assessment      Ther Activity                                   Gait Training                                   Modalities 10'           Prone MH/TENS post L HS  8' skin intact pre and post    10' skin intact pre and post   10' skin intact pre and post                   HEP:   Access Code: [email or web address]  2/28/2024: see assessment; instructed to discontinue HEP and implement activity modification techniques.               "

## 2024-03-06 ENCOUNTER — APPOINTMENT (OUTPATIENT)
Dept: PHYSICAL THERAPY | Facility: CLINIC | Age: 64
End: 2024-03-06
Payer: COMMERCIAL

## 2024-03-06 ENCOUNTER — OFFICE VISIT (OUTPATIENT)
Dept: PHYSICAL THERAPY | Facility: CLINIC | Age: 64
End: 2024-03-06
Payer: COMMERCIAL

## 2024-03-06 DIAGNOSIS — M79.605 LEFT LEG PAIN: ICD-10-CM

## 2024-03-06 DIAGNOSIS — M54.16 LUMBAR RADICULOPATHY: Primary | ICD-10-CM

## 2024-03-06 PROCEDURE — 97110 THERAPEUTIC EXERCISES: CPT

## 2024-03-06 NOTE — PROGRESS NOTES
Daily Note     Today's date: 3/6/2024  Patient name: Marc Douglas  : 1960  MRN: [id number]  Referring provider: Mickey Gaytan*  Dx:   Encounter Diagnosis     ICD-10-CM    1. Lumbar radiculopathy  M54.16       2. Left leg pain  M79.605                      Subjective: Pt states that he was sore this morning; he took some Advil which decreased his left hamstring pain. Pt states that his left foot is still numb; when he touches the side of his foot he can feel the tingling more distinctly. Pt went to the gym twice since his last PT appointment and participated in UE strengthening. Monday he walked on the treadmill for 20 minutes; Tuesday he walked for 10 minutes. 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 Pt states that at one point early on he did see swelling along left posterior thigh which has since subsided.       Objective: See treatment diary below    Hamstring palpation:  R - Pt denies TTP along medial and lateral distal hamstring muscle belly and tendons  L- Distal muscle bell and hamstring tendons TTP. Increased muscle tone noted along medial distal hamstring just proximal to the knee.     Strength testing     3/6/2024 3/6/2024     R  L  Prone knee flexion 5/5  4/5*  Medial hamstring  5/5  4/5*  Lateral hamstring  5/5  4/5*  Glute MTT  5/5  4/5  Hip abd  4/5  5/5    Knee AROM   3/6/2024 3/6/2024     R  L  Q-set/ SLR  -2/-10  -2/-15  Sup knee flex  135/120 125/105      Assessment: Pt demonstrates impaired L knee AROM and L glute and hamstring strength compared to contralateral LE. Pt's L hamstring TTP. Pt's current presentation consistent with hamstring pathology. Implemented light hamstring stretch today. HEP updated; however pt instructed to monitor pain and to hold HEP if symptoms peripheralize or back pain results. Pt with plan to return to MD to report remaining symptoms if needed.  "Re-evaluation to be performed next session for additional L hamstring testing. Tolerated treatment well. Patient would benefit from continued PT.       Plan: Continue per plan of care.  Progress treatment as tolerated.       Precautions:   Past Medical History:   Diagnosis Date    Asthma     Hyperlipidemia     Nasal polyps     Near syncope     Sleep apnea, obstructive      Past Surgical History:   Procedure Laterality Date    ARTHROSCOPY KNEE      COLONOSCOPY      [place] SURGICAL ARTHROSCOPY SHOULDER REMOVAL LOOSE/FB Right 12/6/2018    Procedure: ARTHROSCOPIC SHOULDER;  Surgeon: Mickey Gaytan MD;  Location: [facility];  Service: Orthopedics    [place] SURGICAL ARTHROSCOPY SHOULDER W/ROTATOR CUFF RPR Right 12/6/2018    Procedure: REPAIR ROTATOR CUFF  ARTHROSCOPIC WITH POSSIBLE REMOVAL LOOSE BODIES AND POSSIBLE DECOMPRESSION;  Surgeon: Mickey Gaytan MD;  Location: [facility];  Service: Orthopedics    SINUS SURGERY         SOC: 2/15/2024  FOTO: 2/15/2024  POC Expiration: 5/9/2024   Daily Treatment Log  Date: 3/4/2024 3/6/2024 Next session  2/26/2024 2/28/2024   Visit#/ auth: 6 7   4  5   Objective Measures          Continued mechanical assessment          SI objective measures     KE   Manuals       35'   DKTC w/ OP            supine flex rotation to L           Objective measures       KE; see objective section (SI joint)   Neuro Re-Ed 5'   20'   5'    REIL      EUN 1x10   EUN @ wall 1x5       repeated L SGIS            supine sciatic NG w/ sos  R/L 1 x 10 seated NG    15x R/L   1 x 15 R/L seated NG   supine figure 4 stretch                                            Ther Ex 30' 45'     5'   Treadmill 5' 1.5 mph       Hamstring stretch   60\" x 3 w/ sos, contralateral knee flex      Hip flexor stretch L w/ pelvic tilt 30\" x 3 Painful; held      Piriformis stretch       30\" x 2 R/L    Heel slides w/ sos  1 x 15 L       Knee to chest w/ physio 1 x 15 L       Posterior pelvic tilt 5\" x 10 Supine   5\" x 10      TA set " "5\" x 10        Prone knee flex          Prone hip ext        Supine TA set w/ RLE march 1 x 10          Hamstring iso  L 3\" x 10        SAQ w/ DF (depending)           Clamshell  L AROM 1 x 10         SL SLR  L 1 x 10                  EDUCATION: Pathology, review of impairements, prognosis, activity modification, POC, and HEP    20' see assessment      Ther Activity                                Gait Training                                Modalities 10'          Prone MH/TENS post L HS  8' skin intact pre and post      10' skin intact pre and post                  HEP:   Access Code: [email or web address]  URL: [email or web address]  Date: 03/06/2024  Prepared by: Ivory Steele    Exercises  - Hooklying Hamstring Stretch with Strap  - 2 x daily - 3 sets - 30 sec hold  - Clamshell  - 1 x daily - 1 sets - 10 reps  - Supine Heel Slide with Strap  - 1 x daily - 1 sets - 10 reps                 "

## 2024-03-11 ENCOUNTER — EVALUATION (OUTPATIENT)
Dept: PHYSICAL THERAPY | Facility: CLINIC | Age: 64
End: 2024-03-11
Payer: COMMERCIAL

## 2024-03-11 DIAGNOSIS — M79.605 LEFT LEG PAIN: ICD-10-CM

## 2024-03-11 DIAGNOSIS — M54.16 LUMBAR RADICULOPATHY: Primary | ICD-10-CM

## 2024-03-11 PROCEDURE — 97110 THERAPEUTIC EXERCISES: CPT

## 2024-03-11 NOTE — PROGRESS NOTES
PT Re-Evaluation     Today's date: 3/11/2024  Patient name: Marc Douglas  : 1960  MRN: [id number]  Referring provider: Mickey Gaytan*  Dx:   Encounter Diagnosis     ICD-10-CM    1. Lumbar radiculopathy  M54.16       2. Left leg pain  M79.605                      Assessment  Assessment details: 2/15/2024: Marc Douglas is a 63 y.o. male who presents with L posterior LE radicular pain. Pt demonstrates impaired LLE S1- S2 myotomal strength, LLE S1 sensation, lumbar AROM, and bilateral hamstring flexibility. Due to these impairments, patient has difficulty sitting for prolonged periods, bending, and lifting affecting his ability to drive, participate in exercises at gym, and complete ADLs involving retrieving/ lifting items into cabinets. Patient's clinical presentation is consistent with their referring diagnosis of Lumbar radiculopathy. Patient has been educated in spine anatomy, importance of HEP/ to discontinue if symptoms increase/peripheralization, and use of lumbar roll. Pt educated on pathology, review of impairements, prognosis, activity modification, POC, and HEP. Patient would benefit from skilled physical therapy services to address their aforementioned functional limitations and progress towards prior level of function and independence with home exercise program and self symptom management/resolution.     3/11/2024: Marc Douglas is a 63 y.o. male who presents with LLE posterior radicular pain. Pt demonstrates improving LLE strength and LLE flexibility. Despite improvements, pt continues to present with similar L posterior thigh pain compared to IE, impaired sensation, lumbar spine AROM, hamstring flexibility, LLE strength, and posture. Pt presents with positive LLE SLR and slump test. Due to these impairments, patient has difficulty sitting for prolonged periods, bending, and lifting affecting his ability to drive, participate in exercises at gym, and complete ADLs  involving retrieving/ lifting items into cabinets. Patient's clinical presentation is consistent with their referring diagnosis of Lumbar radiculopathy. Investigated potential for hamstring tendinopathy, however pt continues to demonstrate ability to change severity of familiar pain w/ lumbar AROM. Discussed spine anatomy, peripheralization vs centralization, and use of lumbar roll. Pt educated on pathology, review of impairements, prognosis, activity modification, POC, and HEP. Pt instructed to return to referring provider due to remaining pain and limited progress with PT. Patient would benefit from skilled physical therapy services to address their aforementioned functional limitations and progress towards prior level of function and independence with home exercise program and self symptom management/resolution.     Plan: Ambulatory Referral to Physical Therapy  Impairments: abnormal or restricted ROM, activity intolerance, impaired physical strength, lacks appropriate home exercise program, pain with function, weight-bearing intolerance, poor posture  and poor body mechanics  Other impairment: poor tolerance to prolonged static positions  Functional limitations: limited tolerance to sitting/driving/bendingUnderstanding of Dx/Px/POC: good   Prognosis: good    Goals  Short Term Goals:  Target Date 3/14/2024  1. Initiate and advance HEP to demo active participation in recovery.   (Progressing 3/11/2024)  2. Improve postural awareness and demonstrate self postural correction t/o full PT session w/out VC. (Ongoing 3/11/2024)  3. Improve AROM lumbar spine to min steward or better t/o for improved tolerance to bending/ reaching. (Ongoing 3/11/2024)  4. Pt to tolerate prolonged sitting/standing x 1 hour or more w/o c/o pain to return to pain-free driving.  (Ongoing 3/11/2024)      Long Term Goals:  Target Date 5/9/2024  1. Indep with HEP and self symptom prevention for self symptom reduction/resolution   2. Achieve lumbar  AROM to WNL w/o c/o pain for improved functional mobility w/ bending and lifting.   3. Improve LLE strength to 5/5 to improve gait mechanics necessary for safe ambulation in community.   3. Improve LE/core strength to good to allow pt to tolerate bending and lifting/carrying 20# x 10/1' w/o c/o pain for return to lifting in home/ gym.  4. Resolve L foot numbness for improved sensation/ balance with community ambulation.    Plan  Plan details: HEP development and progression, monitor patients adherence to activity modification to ensure adequate healing time/ recovery. Implement stretching, A/AA/PROM, joint mobilizations, posture education, STM/MI as needed to reduce muscle tension, muscle reeducation. Progress strengthening; improve pt's tolerance to resisted WB activities. PLOC discussed and agreed upon with patient.   Patient would benefit from: skilled PT and PT eval  Planned modality interventions: thermotherapy: hydrocollator packs and unattended electrical stimulation  Planned therapy interventions: joint mobilization, patient education, postural training, abdominal trunk stabilization, functional ROM exercises, home exercise program, neuromuscular re-education, strengthening, stretching, therapeutic activities and therapeutic exercise  Other planned therapy interventions: mechanical assessment  Frequency: 2-3x/week.  Duration in weeks: 12  Plan of Care beginning date: 2/15/2024  Plan of Care expiration date: 5/9/2024  Treatment plan discussed with: patient        Subjective Evaluation    History of Present Illness  Mechanism of injury: 2/15/2024: Pt is a 63 y.o male who reports to PT with complaint of L posterior thigh pain. Recent exacerbation began 3 weeks ago. Pain is in medial posterior thigh. Pain does not go into back. Pain typically does not go past knee; occasionally does go into calf. Noticed that calf pain is present with leg extensions at gym. First time this happened was in August 2023; pain has  resolved with time however, numbness into the foot remains. Onset in 2023 began with driving. Numbness is along lateral aspect of foot, yet the whole foot feels numb. Occasional tingling into foot. Pain in thigh is sharp and dull; described as annoying pain. Pt is taking gabapentin and acetaminophen to manage condition current; was prescribed gabapentin for initial pain as well. Cannot point to one thing specifically causing pain; he wonders if knee exercises in gym affected it. He goes to the gym regularly; stopped end of , he started going back in December. Aggravating factors are sitting. Walking is not painful. He has been doing cold and hot application and self massage which has given relief. He did have slight back pain but does not think it is correlated. Has not had an MRI of back or thigh. First time it happened he went to his primary care doctor. He cannot carry things, lift heavy or sit for prolonged periods. Denies bowel/ bladder change with onset of symptoms. States that oxycodone he was taking at one point did affect his bowels. Sitting. Denies significant medical hx.     3/11/2024: Pt states that with his updated home exercises (addressing hamstring pain, he felt increased numbness in his last two toes. At baseline he has numbness in his last two toes and lateral aspect of foot; he has increased intensity in toe numbness. Pt states that he works from home in a chair that does not have a back rest; he uses coffee table to rest his computer on which is lower than waist height in sitting. He tries to go to the library to work; however spends at least one hour per day working at home w/ set up described above. Pt states that he has limited ability to change his home set up. He has been walking at the gym rather than running.   Patient Goals  Patient goals for therapy: decreased pain    Pain  Current pain ratin  At best pain ratin (Sleep)  At worst pain ratin    Social Support  Steps  to enter house: yes  5  Stairs in house: yes     Treatments  Previous treatment: physical therapy and medication        Objective  POSTURE:  2/15/2024: Standing posture demonstrates increased thoracic kyphosis and decreased lumbar lordosis; no gross lateral shift noted. Posture correction in stting NE on symptoms.   3/11/2024: Unchanged from IE.     Gait:   2/15/2024: Antalgic.   3/11/24: Antalgic.     SPECIAL TESTS     2/15/2024 3/11/2024  SLR supine:   (- R/+ L) (- R/+ L)  Crossed SLR:   (- R/- L) (- R/- L)  Slump test:   (-R/+L)  (-R/+L)     DERMATOMAL TESTIN/15/2024   3/11/2024     Light touch   Light touch  L2 anterior thigh:  Intact B/L   NT  L3 medial knee:  Intact B/L   NT  L4 medial maleolus:  Intact B/L   NT  L5 1st web space:  Intact B/L   Intact L   S1 lateral/sole foot:  Intact R; Impaired L  Impaired L  S2 poster calf:  Intact B/L   NT    MYOTOMAL TESTIN/15/2024 2/15/2024 3/11/2024 3/11/2024     Right  Left  Right  Left  L2-3 Hip flexion:  5/5  5/5  NT  5/5  L3-4 Knee extension:  5/5  5/5*  NT  5/5*  L5 Great toe exten:  5/5  5/5  NT  5/5  L4 Heel walk/DF:  5/5  5/5  NT  5/5 DF  S1 toe walk/PF  5/5  4/5*  NT  5/5 PF  S2 knee flex:   5/5  4/5*  NT  5/5     Strength testing     2/15/2024 2/15/2024 3/6/2024 3/6/2024     R  L  R  L  Prone knee flexion NT  NT  5/5  4/5*  Medial hamstring  NT  NT  5/5  4/5*  Lateral hamstring  NT  NT  5/5  4/5*  Glute MTT  NT  NT  5/5  4/5  Hip abd  NT  NT  4/5  5/5    Knee AROM   2/15/2024 2/15/2024 3/6/2024 3/6/2024     R  L  R  L  Q-set/ SLR  NT  NT  -2/-10  -2/-15  Sup knee flex  NT  NT  135/120 125/105      REFLEXES: 0= none; 1+ = slight; 2+ = brisk/normal; 3+= very brisk; 4+= clonus     2/15/2024 3/11/2024  L3-4 Quadriceps:  2+ B/L  NT  L5-S1 Achilles:  2+ B/L   NT    LUMBAR AROM: 2/15/2024 3/11/2024  Flexion   Mod steward Mod steward*  Extension  Min steward  Min steward  R sideglide  mod steward min  L sideglide  mod steward min    MECHANICAL ASSESSMENT:   2/15/2024:  pre-test findings include 6/10 L hamstring pain  Repeated extension in lying (REIL) 2x10 = centralizing - pt reports decreased numbness in L foot, pain in posterior thigh NE  Repeated flexion in sitting 1x10 = NE  3/11/2024: pre-test findings include L hamstring pain 6-7/10 numbness 4-5th digit LLE   ANIBAL x 2 min = increase, better   REIL 1 x 10 = increase, worse   RFIS 1 x 10 = decrease, no better (min decrease in numbness)   RFIS 2 x 10 = increase, worse    FUNCTION:  2/15/2024:    Pain with prolonged sitting; pain w/ driving   No issue w/ walking or sleep   Unable to resume gym exercises w/ current pain  3/11/2024   Sitting tolerance 15-20 minutes; comfortable chair (softer and does no compress against hamstring) 60 minutes   Standing/ walking tolerance = no issue   Modifying gym activity currently; performing UE exercises and walking at 2 mph on treadmill    FLEXIBILITY:  2/15/2024:  Hamstring flexibility R 50 deg,  L 30 deg,          Pirif flexibility R min steward;  L max steward  3/11/2024   Hamstring flexibility R 65 deg L 58 deg    Hamstring palpation:  2/15/2024: NT  3/06/2024:   R - Pt denies TTP along medial and lateral distal hamstring muscle belly and tendons  L- Distal muscle bell and hamstring tendons TTP. Increased muscle tone noted along medial distal hamstring just proximal to the knee.   3/11/2024:    CPA to L4, L5, S1 and S2 increases intensity of L posterior thigh pain.        Precautions:   Past Medical History:   Diagnosis Date    Asthma     Hyperlipidemia     Nasal polyps     Near syncope     Sleep apnea, obstructive      Past Surgical History:   Procedure Laterality Date    ARTHROSCOPY KNEE      COLONOSCOPY      [place] SURGICAL ARTHROSCOPY SHOULDER REMOVAL LOOSE/FB Right 12/6/2018    Procedure: ARTHROSCOPIC SHOULDER;  Surgeon: Mickey Gaytan MD;  Location: [facility];  Service: Orthopedics    [place] SURGICAL ARTHROSCOPY SHOULDER W/ROTATOR CUFF RPR Right 12/6/2018    Procedure: REPAIR ROTATOR CUFF   "ARTHROSCOPIC WITH POSSIBLE REMOVAL LOOSE BODIES AND POSSIBLE DECOMPRESSION;  Surgeon: Mickey Gaytan MD;  Location: [facility];  Service: Orthopedics    SINUS SURGERY         SOC: 2/15/2024  FOTO: 2/15/2024  POC Expiration: 5/9/2024   Daily Treatment Log  Date: 3/4/2024 3/6/2024 3/11/2024   2/28/2024   Visit#/ auth: 6 7 8   5   Objective Measures         Continued mechanical assessment         SI objective measures     KE   Manuals      35'   DKTC w/ OP           supine flex rotation to L          Objective measures      KE; see objective section (SI joint)   Neuro Re-Ed 5'     5'    REIL           repeated L SGIS           supine sciatic NG w/ sos  R/L 1 x 10 seated NG     1 x 15 R/L seated NG   supine figure 4 stretch                                        Ther Ex 30' 45' 45'   5'   Treadmill 5' 1.5 mph       Hamstring stretch   60\" x 3 w/ sos, contralateral knee flex      Hip flexor stretch L w/ pelvic tilt 30\" x 3 Painful; held      Piriformis stretch      30\" x 2 R/L    Heel slides w/ sos  1 x 15 L       Knee to chest w/ physio 1 x 15 L       Posterior pelvic tilt 5\" x 10 Supine   5\" x 10      TA set 5\" x 10        Prone knee flex         Prone hip ext        Supine TA set w/ RLE march 1 x 10         Hamstring iso  L 3\" x 10        SAQ w/ DF (depending)          Clamshell  L AROM 1 x 10        SL SLR  L 1 x 10       Objective measures: Lumbar AROM, myotomes, flexibility   KE      EDUCATION: Pathology, review of impairements, prognosis, activity modification, POC, and HEP   KE; HEP updated      Ther Activity                             Gait Training                             Modalities 10'         Prone MH/TENS post L HS  8' skin intact pre and post                    HEP:   Access Code: [email or web address]  URL: [email or web address]  Date: 03/11/2024  Prepared by: Ivory Steele    Exercises  - Supine Heel Slide with Strap  - 1 x daily - 1 sets - 10 reps         "

## 2024-03-13 ENCOUNTER — OFFICE VISIT (OUTPATIENT)
Dept: PHYSICAL THERAPY | Facility: CLINIC | Age: 64
End: 2024-03-13
Payer: COMMERCIAL

## 2024-03-13 DIAGNOSIS — M54.16 LUMBAR RADICULOPATHY: Primary | ICD-10-CM

## 2024-03-13 DIAGNOSIS — M79.605 LEFT LEG PAIN: ICD-10-CM

## 2024-03-13 PROCEDURE — 97112 NEUROMUSCULAR REEDUCATION: CPT

## 2024-03-13 PROCEDURE — 97110 THERAPEUTIC EXERCISES: CPT

## 2024-03-13 NOTE — PROGRESS NOTES
Daily Note     Today's date: 3/13/2024  Patient name: Marc Douglas  : 1960  MRN: [id number]  Referring provider: Mickey Gaytan*  Dx:   Encounter Diagnosis     ICD-10-CM    1. Lumbar radiculopathy  M54.16       2. Left leg pain  M79.605                      Subjective: Pt states that he feels a little better today. He states that he was doing HEP but realized he was doing the wrong exercises as he was actually doing a hamstring stretch vs heel slides. On Monday after the gym the bottom of his left foot felt like he was stepping on something. He continues to have this feeling today. He continues to walk at the gym vs running. He has 5/10 pain. Pain is constant. He states that the cough he presents with has been present for as long as he can remember.       Objective: See treatment diary below    Hip AROM (seated) 3/13/2024 3/13/2024     R  L  External rotation WNL  WNL  Internal rotation 40 deg  40 deg    Assessment: Pt w/ persistent cough; instruction to inform MD of this. Pt with plan to see PCP. Implemented exercises addressing L knee AROM deficits; pt reports no change in pain w/ these symptoms. Implemented light L hamstring isometrics to address L posterior chain weakness. Pt w/ report of glute discomfort w/ seated sciatic nerve glides. Assessed hip IR/ER AROM today, min limitation noted in B/L hip IR. Pt asks which type of imaging he should obtain if MD suggests this; informed pt that MD will be able to determine this based on hx of symptoms/ current presentation and expertise. Tolerated treatment well. Patient would benefit from continued PT.       Plan: Continue per plan of care.  Progress treatment as tolerated.       Precautions:   Past Medical History:   Diagnosis Date    Asthma     Hyperlipidemia     Nasal polyps     Near syncope     Sleep apnea, obstructive      Past Surgical History:   Procedure Laterality Date    ARTHROSCOPY KNEE      COLONOSCOPY      [place] SURGICAL ARTHROSCOPY  "SHOULDER REMOVAL LOOSE/FB Right 12/6/2018    Procedure: ARTHROSCOPIC SHOULDER;  Surgeon: Mickey Gaytan MD;  Location: [facility];  Service: Orthopedics    [place] SURGICAL ARTHROSCOPY SHOULDER W/ROTATOR CUFF RPR Right 12/6/2018    Procedure: REPAIR ROTATOR CUFF  ARTHROSCOPIC WITH POSSIBLE REMOVAL LOOSE BODIES AND POSSIBLE DECOMPRESSION;  Surgeon: Mickey Gaytan MD;  Location: [facility];  Service: Orthopedics    SINUS SURGERY         SOC: 2/15/2024  FOTO: 2/15/2024  POC Expiration: 5/9/2024   Daily Treatment Log  Date:   3/11/2024 Next session    Visit#/ auth:   8     Objective Measures        Hip AROM   KE: see objective     SI objective measures        Manuals        Objective measures        Neuro Re-Ed   8'      supine sciatic NG w/ sos    Seated NG  R/L 1 x 10       supine figure 4 stretch                                    Ther Ex   30'     Treadmill        Quadriceps stretch   30\" x 3 L w/ sos     Prone knee flexion   SOS w/ flex  Eccentric lowering w/ sos assist 1 x 10      Bent knee fall outs   1 x 10 B/L     Seated hip IR/ ER w/ SOS   R/L 1 x 5 ea. w/ sos     Piriformis stretch        Heel slides w/ sos   1 x 10 L      Hamstring iso vs tx table   L 5\" x 10     Objective measures: Lumbar AROM, myotomes, flexibility   KE     EDUCATION: Pathology, review of impairements, prognosis, activity modification, POC, and HEP   KE: HEP updated and reviewed     Ther Activity                          Gait Training                          Modalities         Prone MH/TENS post L HS                  HEP:   Access Code: [email or web address]  URL: [email or web address]  Date: 03/13/2024  Prepared by: Ivory Steele    Exercises  - Supine Heel Slide with Strap  - 2 x daily - 1 sets - 10 reps  - Prone Quadriceps Stretch with Strap  - 3 x daily - 3 sets - 30 seconds hold  - Supine Gluteal Sets  - 2 x daily - 1 sets - 10 reps - 5 seconds hold  - Seated Hip Internal Rotation AROM  - 2 x daily - 1 sets - 5 reps  - Seated " Hip External Rotation AROM  - 2 x daily - 1 sets - 5 reps  - Bent Knee Fallouts  - 2 x daily - 1 sets - 10 reps

## 2024-03-20 ENCOUNTER — OFFICE VISIT (OUTPATIENT)
Dept: PHYSICAL THERAPY | Facility: CLINIC | Age: 64
End: 2024-03-20
Payer: COMMERCIAL

## 2024-03-20 DIAGNOSIS — M79.605 LEFT LEG PAIN: ICD-10-CM

## 2024-03-20 DIAGNOSIS — M54.16 LUMBAR RADICULOPATHY: Primary | ICD-10-CM

## 2024-03-20 PROCEDURE — 97112 NEUROMUSCULAR REEDUCATION: CPT

## 2024-03-20 PROCEDURE — 97110 THERAPEUTIC EXERCISES: CPT

## 2024-03-20 NOTE — PROGRESS NOTES
Daily Note     Today's date: 3/20/2024  Patient name: Marc Douglas  : 1960  MRN: [id number]  Referring provider: Mickey Gaytan*  Dx:   Encounter Diagnosis     ICD-10-CM    1. Lumbar radiculopathy  M54.16       2. Left leg pain  M79.605                      Subjective: Pt states that he has not made it back to his referring provider. His car broke down last week which has resulted in transportation issues. He plans to arrange MD visit this week. He has not been going to the gym as frequently. Last night he did go, however. States that he has noticed the hamstring pain is not as bad but the tingling in his foot has worsened. Pt denies back pain but does have pain in L buttock and hamstring and numbness in L foot.     Objective: See treatment diary below      Assessment: Pt does not report increased discomfort with supine table exercises implemented today. Pt with report of back discomfort following prone quad stretch. Increased L foot numbness w/ seated hip ER/ IR AAROM. Updated HEP to remove exercises that exacerbate his pain. Holding NG at this time due to irritability of tensioned sciatic nerve. Tolerated treatment well. Patient would benefit from continued PT.      Plan: Continue per plan of care.  Progress treatment as tolerated.       Precautions:   Past Medical History:   Diagnosis Date    Asthma     Hyperlipidemia     Nasal polyps     Near syncope     Sleep apnea, obstructive      Past Surgical History:   Procedure Laterality Date    ARTHROSCOPY KNEE      COLONOSCOPY      [place] SURGICAL ARTHROSCOPY SHOULDER REMOVAL LOOSE/FB Right 2018    Procedure: ARTHROSCOPIC SHOULDER;  Surgeon: Mickey Gaytan MD;  Location: [facility];  Service: Orthopedics    [place] SURGICAL ARTHROSCOPY SHOULDER W/ROTATOR CUFF RPR Right 2018    Procedure: REPAIR ROTATOR CUFF  ARTHROSCOPIC WITH POSSIBLE REMOVAL LOOSE BODIES AND POSSIBLE DECOMPRESSION;  Surgeon: Mickey Gaytan MD;  Location: [facility];   "Service: Orthopedics    SINUS SURGERY         SOC: 2/15/2024  FOTO: 2/15/2024  POC Expiration: 5/9/2024   Daily Treatment Log  Date:   3/11/2024 3/20/2024 Next session   Visit#/ auth:   8 9    Objective Measures        Hip AROM   KE: see objective     SI objective measures        Manuals        Objective measures        Neuro Re-Ed   8' 10'     supine sciatic NG w/ sos    Seated NG  R/L 1 x 10   HOLD    supine figure 4 stretch          Anthony head lift    5\" x 6     Stand side plank at window     Add    Reverse dead bug    1 x 10 R/L    Ther Ex   30' 30'    Treadmill    4' 1.6 mph    Quadriceps stretch   30\" x 3 L w/ sos 30\" x 3 L w/ sos    Prone knee flexion   SOS w/ flex  Eccentric lowering w/ sos assist 1 x 10      Bent knee fall outs   1 x 10 B/L 1 x 10 B/L    Seated hip IR/ ER w/ SOS   R/L 1 x 5 ea. w/ sos L 1 x 10 ea. w/ sos - pain-full    Piriformis stretch        Heel slides w/ sos   1 x 10 L  1 x 15 L     Supine glute set    5\" x 10    Hamstring iso vs tx table   L 5\" x 10 HOLD    Objective measures: Lumbar AROM, myotomes, flexibility   KE     EDUCATION: Pathology, review of impairements, prognosis, activity modification, POC, and HEP   KE: HEP updated and reviewed Reviewed updates    Ther Activity                          Gait Training                          Modalities         Prone MH/TENS post L HS                  HEP:   Access Code: [email or web address]  URL: [email or web address]  Date: 03/13/2024  Prepared by: Ivory Steele    Exercises  - Supine Heel Slide with Strap  - 2 x daily - 1 sets - 10 reps  - Prone Quadriceps Stretch with Strap  - 3 x daily - 3 sets - 30 seconds hold  - Supine Gluteal Sets  - 2 x daily - 1 sets - 10 reps - 5 seconds hold  - Seated Hip Internal Rotation AROM  - 2 x daily - 1 sets - 5 reps - D/C (3/20/2024)  - Seated Hip External Rotation AROM  - 2 x daily - 1 sets - 5 reps - D/C (3/20/2024)  - Bent Knee Fallouts  - 2 x daily - 1 sets - 10 reps         "

## 2024-03-21 ENCOUNTER — APPOINTMENT (OUTPATIENT)
Dept: PHYSICAL THERAPY | Facility: CLINIC | Age: 64
End: 2024-03-21
Payer: COMMERCIAL

## 2024-03-21 ENCOUNTER — TELEPHONE (OUTPATIENT)
Dept: PHYSICAL THERAPY | Facility: CLINIC | Age: 64
End: 2024-03-21

## 2024-03-21 NOTE — TELEPHONE ENCOUNTER
Pt called stating that he has been having car trouble and he was unable to find a ride; he was trying to coordinate ride all morning and apologizes but he is unable to make scheduled PT appt today. Pt states that he will call 24 hrs prior to next scheduled appt 3/27 for confirmation. Pt informed that PT appointment will be removed if we do not hear from him for confirmation.    Ivory Steele, PT, DPT

## 2024-03-26 ENCOUNTER — TELEPHONE (OUTPATIENT)
Dept: PHYSICAL THERAPY | Facility: CLINIC | Age: 64
End: 2024-03-26

## 2024-03-26 NOTE — TELEPHONE ENCOUNTER
Called pt to confirm appointment 3/27. Pt confirming appointment 3/27 at 11:00 am. Pt requesting to cancel appointment 3/28, however, due to trouble coordinating ride w/ recent car trouble. Pt will inform PT about 4/1 appointment at his visit tomorrow and whether or not he will be able to make it.     Ivory Steele, PT, DPT

## 2024-03-27 ENCOUNTER — OFFICE VISIT (OUTPATIENT)
Dept: PHYSICAL THERAPY | Facility: CLINIC | Age: 64
End: 2024-03-27
Payer: COMMERCIAL

## 2024-03-27 DIAGNOSIS — M54.16 LUMBAR RADICULOPATHY: Primary | ICD-10-CM

## 2024-03-27 DIAGNOSIS — M79.605 LEFT LEG PAIN: ICD-10-CM

## 2024-03-27 PROCEDURE — 97110 THERAPEUTIC EXERCISES: CPT

## 2024-03-27 PROCEDURE — 97112 NEUROMUSCULAR REEDUCATION: CPT

## 2024-03-28 ENCOUNTER — APPOINTMENT (OUTPATIENT)
Dept: PHYSICAL THERAPY | Facility: CLINIC | Age: 64
End: 2024-03-28
Payer: COMMERCIAL

## 2024-04-02 ENCOUNTER — TELEPHONE (OUTPATIENT)
Dept: PHYSICAL THERAPY | Facility: CLINIC | Age: 64
End: 2024-04-02

## 2024-04-17 ENCOUNTER — TELEPHONE (OUTPATIENT)
Dept: PHYSICAL THERAPY | Facility: CLINIC | Age: 64
End: 2024-04-17

## 2024-04-17 NOTE — TELEPHONE ENCOUNTER
LM for patient to call clinc to let us know if he is DC to HEP at this time or would like to get back on the schedule for formal PT.

## 2024-04-24 ENCOUNTER — TELEPHONE (OUTPATIENT)
Dept: PHYSICAL THERAPY | Facility: CLINIC | Age: 64
End: 2024-04-24

## 2024-04-25 NOTE — TELEPHONE ENCOUNTER
Pt presents to PT clinic w/ hope to continue PT. He inquires about whether or not he has to return to doctor; pt informed that his current POC is still valid, however pt will need a re-evaluation on return to PT. Pt educated on attendance policy; informed pt that w/ hx of last minute cancel, if he has additional last minute cancel, he will most likely have to transition to same day appointments where he will be able to call same day to schedule.     Ivory Steele, PT, DPT

## 2024-05-02 ENCOUNTER — TELEPHONE (OUTPATIENT)
Dept: PHYSICAL THERAPY | Facility: CLINIC | Age: 64
End: 2024-05-02

## 2024-05-02 NOTE — TELEPHONE ENCOUNTER
Called pt to inform him of missed appointment and to check on his status. Informed pt that he's future appointments are going to be removed due to poor attendance and no show. Asked that pt call back to discuss same day option.     Pt called back, he does not plan to continue PT.     Ivory Steele, PT, DPT

## 2024-07-05 NOTE — ASSESSMENT & PLAN NOTE
Care Management Follow Up    Length of Stay (days): 2    Expected Discharge Date: 07/05/2024     Concerns to be Addressed: discharge planning  Patient plan of care discussed at interdisciplinary rounds: Yes    Anticipated Discharge Disposition:  home with family     Anticipated Discharge Services:  continuing HC (RN, PT, OT) with [facility] (see below)  Anticipated Discharge DME:  none    Patient/family educated on Medicare website which has current facility and service quality ratings:  yes  Education Provided on the Discharge Plan:  yes  Patient/Family in Agreement with the Plan:  yes    Referrals Placed by CM/SW:  n/a  Private pay costs discussed: Not applicable    Additional Information:  Writer received a call from Teodora Hall RN, [facility] Liaison.  Patient is open to [facility] for RN, PT, OT through 7/8/24.  If patient doesn't discharge before 7/8/24, [facility] needs new referral. Patient also has [facility] Complete program.         Care management will continue to follow for discharge planning.       Aurora Wei RN, BSN  RN Care Coordinator    5A Medical Oncology/5C non-BMT  5A beds [id number]   beds [id number] (non-BMT)  [facility]  [address] [address]  [email or web address]  Gender pronouns: she/her  Units: 5A Onc Vocera & 5C Vocera      We discussed the results of his PFTs today  He feels that due to his worsening sinus symptoms over the last week, he has had somewhat worsening of his symptoms; however, prior to this illness, his asthma was fairly well controlled  He will continue with Breo and albuterol as needed  I did discuss that based upon the results of his PFTs, we could consider adding a LAMA  We discussed side effects of these medications and indications  At this time, he would like to hold off on adding another inhaler  He would like to evaluate what the [id number] S [place] does for his symptoms overall  He will follow-up with Dr Jb Lockhart in 3 months for further evaluation  He did ask about getting chest imaging today  We reviewed his tobacco use history and he does not qualify for lung screening CT of the chest   Given his wheeze in the right lower lobe, as well as some cough, I ordered a chest x-ray to evaluate for any abnormality

## 2024-10-08 ENCOUNTER — OFFICE VISIT (OUTPATIENT)
Dept: FAMILY MEDICINE CLINIC | Facility: CLINIC | Age: 64
End: 2024-10-08
Payer: COMMERCIAL

## 2024-10-08 VITALS
HEART RATE: 77 BPM | TEMPERATURE: 98.2 F | BODY MASS INDEX: 25.73 KG/M2 | WEIGHT: 190 LBS | DIASTOLIC BLOOD PRESSURE: 80 MMHG | HEIGHT: 72 IN | RESPIRATION RATE: 12 BRPM | SYSTOLIC BLOOD PRESSURE: 106 MMHG | OXYGEN SATURATION: 98 %

## 2024-10-08 DIAGNOSIS — J41.0 SIMPLE CHRONIC BRONCHITIS (HCC): ICD-10-CM

## 2024-10-08 DIAGNOSIS — J01.00 ACUTE NON-RECURRENT MAXILLARY SINUSITIS: Primary | ICD-10-CM

## 2024-10-08 DIAGNOSIS — N40.0 BENIGN PROSTATIC HYPERPLASIA WITHOUT URINARY OBSTRUCTION: ICD-10-CM

## 2024-10-08 DIAGNOSIS — F33.9 DEPRESSION, RECURRENT (HCC): ICD-10-CM

## 2024-10-08 DIAGNOSIS — J45.40 MODERATE PERSISTENT ASTHMA WITHOUT COMPLICATION: ICD-10-CM

## 2024-10-08 PROCEDURE — G2211 COMPLEX E/M VISIT ADD ON: HCPCS | Performed by: STUDENT IN AN ORGANIZED HEALTH CARE EDUCATION/TRAINING PROGRAM

## 2024-10-08 PROCEDURE — 99214 OFFICE O/P EST MOD 30 MIN: CPT | Performed by: STUDENT IN AN ORGANIZED HEALTH CARE EDUCATION/TRAINING PROGRAM

## 2024-10-08 RX ORDER — METHYLPREDNISOLONE 4 MG
TABLET, DOSE PACK ORAL
Qty: 21 EACH | Refills: 0 | Status: SHIPPED | OUTPATIENT
Start: 2024-10-08

## 2024-10-08 NOTE — PROGRESS NOTES
Clinic Visit Note  Marc Douglas 63 y.o. male   MRN: [id number]    Assessment and Plan      Diagnoses and all orders for this visit:    Acute non-recurrent maxillary sinusitis  -     methylPREDNISolone 4 MG tablet therapy pack; Use as directed on package  -     amoxicillin-clavulanate (AUGMENTIN) 875-125 mg per tablet; Take 1 tablet by mouth every 12 (twelve) hours for 7 days    Depression, recurrent (HCC)    Benign prostatic hyperplasia without urinary obstruction    Moderate persistent asthma without complication    Simple chronic bronchitis (HCC)      Patient is a pleasant 63-year-old male coming in secondary to acute sinusitis symptoms that are not responding to conservative therapy, recommend antibiotic/steroid taper to help relieve congestion/inflammation, if symptoms worsen or not improving reevaluation in office recommended.    My impressions and treatment recommendations were discussed in detail with the patient who verbalized understanding and had no further questions.  Discharge instructions were provided.    Subjective     Chief Complaint: ACV    History of Present Illness:    Upper respiratory tract infection symptoms for over a week now.    The following portions of the patient's history were reviewed and updated as appropriate: allergies, current medications, past family history, past medical history, past social history, past surgical history and problem list.    REVIEW OF SYSTEMS:  A complete 12-point review of systems is negative other than that noted in the HPI.    Review of Systems   Constitutional:  Negative for chills, fatigue and fever.   HENT:  Positive for congestion and postnasal drip. Negative for sore throat.    Eyes:  Negative for pain and visual disturbance.   Respiratory:  Positive for cough. Negative for shortness of breath and wheezing.    Cardiovascular:  Negative for chest pain and palpitations.   Gastrointestinal:  Negative for abdominal pain, constipation, diarrhea, nausea  and vomiting.   Genitourinary:  Negative for dysuria and frequency.   Musculoskeletal:  Negative for back pain and neck pain.   Skin:  Negative for color change and rash.   Neurological:  Negative for dizziness and headaches.   Psychiatric/Behavioral:  Negative for agitation and confusion.    All other systems reviewed and are negative.        Current Outpatient Medications:     amoxicillin-clavulanate (AUGMENTIN) 875-125 mg per tablet, Take 1 tablet by mouth every 12 (twelve) hours for 7 days, Disp: 14 tablet, Rfl: 0    buPROPion (WELLBUTRIN XL) 300 mg 24 hr tablet, Take 300 mg by mouth every morning, Disp: , Rfl:     busPIRone (BUSPAR) 10 mg tablet, Take 10 mg by mouth 2 (two) times a day, Disp: , Rfl:     dupilumab (DUPIXENT) subcutaneous injection, Inject 2 mL (300 mg total) under the skin every 14 (fourteen) days, Disp: 4 mL, Rfl: 5    esomeprazole (NexIUM) 20 mg capsule, take 1 capsule by mouth twice a day before meals, Disp: 180 capsule, Rfl: 3    Fluticasone Propionate (Xhance) 93 MCG/ACT EXHU, 2 sprays into each nostril 2 (two) times a day, Disp: 32 mL, Rfl: 5    ibuprofen (MOTRIN) 600 mg tablet, Take 1 tablet (600 mg total) by mouth every 6 (six) hours as needed for moderate pain, Disp: 30 tablet, Rfl: 0    methylPREDNISolone 4 MG tablet therapy pack, Use as directed on package, Disp: 21 each, Rfl: 0    montelukast (SINGULAIR) 10 mg tablet, take 1 tablet by mouth every morning, Disp: 90 tablet, Rfl: 3    Multiple Vitamin (MULTIVITAMIN) tablet, Take 1 tablet by mouth daily, Disp: , Rfl:     rOPINIRole (REQUIP) 2 mg tablet, Take 1 tablet (2 mg total) by mouth daily at bedtime, Disp: 90 tablet, Rfl: 3    rosuvastatin (CRESTOR) 10 MG tablet, Take 1 tablet (10 mg total) by mouth daily at bedtime, Disp: 90 tablet, Rfl: 3    traZODone (DESYREL) 100 mg tablet, Take 100 mg by mouth daily at bedtime, Disp: , Rfl:     Ventolin  (90 Base) MCG/ACT inhaler, Inhale 2 puffs every 4 (four) hours as needed for  wheezing or shortness of breath FOUR Inahlers and 3 refills, Disp: 72 g, Rfl: 3    cyclobenzaprine (FLEXERIL) 10 mg tablet, Take 1 tablet (10 mg total) by mouth daily at bedtime, Disp: 30 tablet, Rfl: 0    fluticasone-vilanterol (BREO ELLIPTA) 200-25 MCG/INH inhaler, Inhale 1 puff daily Rinse mouth after use. (Patient not taking: Reported on 7/21/2023), Disp: 3 each, Rfl: 3    gabapentin (Neurontin) 100 mg capsule, Take 1 capsule (100 mg total) by mouth 2 (two) times a day, Disp: 60 capsule, Rfl: 0    Myrbetriq 25 MG TB24, take 1 tablet by mouth once daily at bedtime, Disp: 30 tablet, Rfl: 3    oxyCODONE-acetaminophen (Percocet) 5-325 mg per tablet, Take 1 tablet by mouth every 4 (four) hours as needed for moderate pain Max Daily Amount: 6 tablets (Patient not taking: Reported on 1/24/2024), Disp: 39 tablet, Rfl: 0    valACYclovir (VALTREX) 500 mg tablet, Take 500 mg by mouth daily, Disp: , Rfl:   Past Medical History:   Diagnosis Date    Allergic     Anxiety     Asthma     COPD (chronic obstructive pulmonary disease) (HCC)     Depression     GERD (gastroesophageal reflux disease)     Hyperlipidemia     Nasal polyps     Near syncope     Sleep apnea, obstructive      Past Surgical History:   Procedure Laterality Date    ARTHROSCOPY KNEE      COLONOSCOPY      [place] SURGICAL ARTHROSCOPY SHOULDER REMOVAL LOOSE/FB Right 12/6/2018    Procedure: ARTHROSCOPIC SHOULDER;  Surgeon: Mickey Gaytan MD;  Location: [facility];  Service: Orthopedics    [place] SURGICAL ARTHROSCOPY SHOULDER W/ROTATOR CUFF RPR Right 12/6/2018    Procedure: REPAIR ROTATOR CUFF  ARTHROSCOPIC WITH POSSIBLE REMOVAL LOOSE BODIES AND POSSIBLE DECOMPRESSION;  Surgeon: Mickey Gaytan MD;  Location: [facility];  Service: Orthopedics    SINUS SURGERY       Social History     Socioeconomic History    Marital status: Legally      Spouse name: Not on file    Number of children: Not on file    Years of education: Not on file    Highest education level:  Not on file   Occupational History    Not on file   Tobacco Use    Smoking status: Former     Current packs/day: 0.00     Average packs/day: 0.5 packs/day for 15.0 years (7.5 ttl pk-yrs)     Types: Cigarettes     Start date:      Quit date:      Years since quittin.7    Smokeless tobacco: Never    Tobacco comments:     smoked 3-4 days    Vaping Use    Vaping status: Never Used   Substance and Sexual Activity    Alcohol use: No    Drug use: No    Sexual activity: Yes   Other Topics Concern    Not on file   Social History Narrative    Not on file     Social Determinants of Health     Financial Resource Strain: High Risk (2024)    Overall Financial Resource Strain (CARDIA)     Difficulty of Paying Living Expenses: Hard   Food Insecurity: Not on file   Transportation Needs: Unmet Transportation Needs (2024)    PRAPARE - Transportation     Lack of Transportation (Medical): Yes     Lack of Transportation (Non-Medical): Yes   Physical Activity: Not on file   Stress: Not on file   Social Connections: Not on file   Intimate Partner Violence: Not on file   Housing Stability: Not on file     Family History   Problem Relation Age of Onset    Dementia Mother     Kidney disease Mother     Dementia Father     Rheumatologic disease Father     Other Father         rheumatism    No Known Problems Sister     No Known Problems Brother     No Known Problems Maternal Aunt     Vision loss Maternal Uncle     No Known Problems Paternal Aunt     No Known Problems Paternal Uncle     No Known Problems Maternal Grandmother     No Known Problems Maternal Grandfather     No Known Problems Paternal Grandmother     No Known Problems Paternal Grandfather     ADD / ADHD Neg Hx     Anesthesia problems Neg Hx     Cancer Neg Hx     Clotting disorder Neg Hx     Collagen disease Neg Hx     Diabetes Neg Hx     Dislocations Neg Hx     Learning disabilities Neg Hx     Neurological problems Neg Hx     Osteoporosis Neg Hx     Scoliosis  Neg Hx     Vascular Disease Neg Hx      No Known Allergies    Objective     Vitals:    10/08/24 1448   BP: 106/80   BP Location: Left arm   Patient Position: Standing   Cuff Size: Large   Pulse: 77   Resp: 12   Temp: 98.2 °F (36.8 °C)   TempSrc: Temporal   SpO2: 98%   Weight: 86.2 kg (190 lb)   Height: 6' (1.829 m)       Physical Exam:     GENERAL: NAD, pleasant   HEENT:  NC/AT, PERRL, EOMI, no scleral icterus  CARDIAC:  RRR, +S1/S2, no S3/S4 appreciated, no m/g/r  PULMONARY:  CTA B/L, no wheezing/rales/rhonci, non-labored breathing  ABDOMEN:  Soft, NT/ND, no rebound/guarding/rigidity  Extremities:. No edema, cyanosis, or clubbing  Musculoskeletal:  Full range of motion intact in all extremities   NEUROLOGIC: Grossly intact, no focal deficits  SKIN:  No rashes or erythema noted on exposed skin  Psych: Normal affect, mood stable    ==  PLEASE NOTE:  This encounter was completed utilizing the M- Mod[employer]gripNote Direct Speech Voice Recognition Software. Grammatical errors, random word insertions, pronoun errors and incomplete sentences are occasional consequences of the system due to software limitations, ambient noise and hardware issues.These may be missed by proof reading prior to affixing electronic signature. Any questions or concerns about the content, text or information contained within the body of this dictation should be directly addressed to the physician for clarification. Please do not hesitate to call me directly if you have any any questions or concerns.    Dami Ramsay, DO  [facility]   [facility]

## 2024-10-14 ENCOUNTER — OFFICE VISIT (OUTPATIENT)
Dept: PULMONOLOGY | Facility: MEDICAL CENTER | Age: 64
End: 2024-10-14
Payer: COMMERCIAL

## 2024-10-14 ENCOUNTER — TELEPHONE (OUTPATIENT)
Age: 64
End: 2024-10-14

## 2024-10-14 VITALS
HEART RATE: 68 BPM | WEIGHT: 189.3 LBS | SYSTOLIC BLOOD PRESSURE: 122 MMHG | BODY MASS INDEX: 25.64 KG/M2 | TEMPERATURE: 98.2 F | DIASTOLIC BLOOD PRESSURE: 84 MMHG | RESPIRATION RATE: 12 BRPM | OXYGEN SATURATION: 96 % | HEIGHT: 72 IN

## 2024-10-14 DIAGNOSIS — G25.81 RESTLESS LEG SYNDROME: ICD-10-CM

## 2024-10-14 DIAGNOSIS — J32.9 CHRONIC RHINOSINUSITIS: ICD-10-CM

## 2024-10-14 DIAGNOSIS — J45.40 MODERATE PERSISTENT ASTHMA, UNCOMPLICATED: Primary | ICD-10-CM

## 2024-10-14 PROCEDURE — 99214 OFFICE O/P EST MOD 30 MIN: CPT | Performed by: INTERNAL MEDICINE

## 2024-10-14 RX ORDER — ROPINIROLE 2 MG/1
2 TABLET, FILM COATED ORAL
Qty: 90 TABLET | Refills: 3 | Status: SHIPPED | OUTPATIENT
Start: 2024-10-14

## 2024-10-14 RX ORDER — BUSPIRONE HYDROCHLORIDE 15 MG/1
TABLET ORAL
COMMUNITY
Start: 2024-09-17

## 2024-10-14 RX ORDER — ALBUTEROL SULFATE 90 UG/1
2 AEROSOL, METERED RESPIRATORY (INHALATION) EVERY 4 HOURS PRN
Qty: 54 G | Refills: 3 | Status: SHIPPED | OUTPATIENT
Start: 2024-10-14

## 2024-10-14 RX ORDER — FLUTICASONE FUROATE, UMECLIDINIUM BROMIDE AND VILANTEROL TRIFENATATE 100; 62.5; 25 UG/1; UG/1; UG/1
1 POWDER RESPIRATORY (INHALATION) DAILY
Qty: 60 BLISTER | Refills: 7 | Status: SHIPPED | OUTPATIENT
Start: 2024-10-14 | End: 2024-11-13

## 2024-10-14 NOTE — PATIENT INSTRUCTIONS
I reordered your Requip 2 mg at bedtime for your restless legs    Try Trelegy 100 mcg 1 puff daily for a week or so and can use this when needed when your asthma starts to act up.  If you would like Trelegy I will send prescription for 3-month supply    Peak flow rate today was 580 L/min.  Normal range is 470 to  590 L/min.     If you want a nebulizer just message us or call our office and I can place order for nebulizer

## 2024-10-14 NOTE — PROGRESS NOTES
Assessment & Plan        Problem List Items Addressed This Visit          Respiratory    Moderate persistent asthma, uncomplicated  - Primary     I did give him a peak flow meter.  Peak flow rate is 580 L/min and predicted is 470 to  590 L/min.  He does get some intermittent chest tightness recently since he has sinus infection.  I did give him a sample of Trelegy 100 mcg to use 1 puff daily for next week or 2.  If this helps him significantly he can continue this as needed.  Previous had been on Breo before which helped but he longer has it.  Breo is no longer covered by his prescription plan but Trelegy is covered.  I did send prescription for Trelegy to his pharmacy.    I did offer to order him a nebulizer for home use but at this time he did not feel like he needed it.  If he changes his mind he will contact my office.         Relevant Medications    Ventolin  (90 Base) MCG/ACT inhaler    fluticasone-umeclidinium-vilanterol (Trelegy Ellipta) 100-62.5-25 mcg/actuation inhaler    Chronic rhinosinusitis     He does have history of chronic rhinosinusitis with history of nasal polyps.  He had started Dupixent 300 mg subcu every 2 weeks in May 2023 and this was prescribed ENT physician Dr. Crum.  He felt this did help but he stopped taking it in July of this year.  He is going to be restarting as he felt it did help him with his chronic rhinosinusitis also help with his asthma.            Neurology/Sleep    Restless leg syndrome     Riley does have restless leg syndrome and neurologic prescription for Requip 2 mg which helped him greatly.  I did send new prescription to his pharmacy for the Requip he can take 2 mg at bedtime for his restless leg syndrome         Relevant Medications    rOPINIRole (REQUIP) 2 mg tablet         Cc: Mild chest tightness at times.      General  Associated symptoms include headaches, myalgias and a sore throat. Pertinent negatives include no abdominal pain.       Riley presents  for follow-up of his moderate persistent asthma.  He also has restless leg syndrome and does take Requip.      He was seen by his family physician Dr. Ramsay on October 8 complaining of nasal congestion and sinus pressure.  He was prescribed Augmentin 875 mg twice daily for 7 days and also given a Medrol Dosepak.  He states he is having some mild chest tightness at times with this is significant better.  Not having any shortness of breath.  Has been uses Ventolin inhaler periodically.  In past he had been on Breo for his asthma but once he started Dupixent he did not need this anymore.  He was initiated on Dupixent around May 2023 by ENT Dr. Simms for chronic rhinosinusitis and nasal polyposis.  He has history of nasal polyps and has had surgery have is removed..  He was placed on Dupixent 300 mg subcutaneous every 2 weeks he says this has helped him and he is asthma improved with this therapy.  Has not needed to use Breo since starting.  He did stop taking the Dupixent in July and has been off of it up until just recently.  He is going to be restarting it.    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  Gets some daytime fatigue but not excessive.  In past home sleep study showed AHI of 14 consistent with mild TAJ.  He did run out of his Requip and said this did help with his restless leg syndrome before and he took a dose of 2 mg daily which did control his symptoms.  He has been out of the Requip and now having problems with his restless legs again.    Only has occasional wheezing not have any shortness of breath with activity.    He did have a sleep study back in 2006 was diagnosed with mild TAJ with AHI 14.7.  He was tried on BiPAP but he could not tolerate it and has not been on therapy since then.  Not having excessive daytime somnolence.    Past Medical History:   Diagnosis Date    Allergic     Anxiety     Asthma     COPD (chronic obstructive pulmonary disease)  (HCC)     Depression     GERD (gastroesophageal reflux disease)     Hyperlipidemia     Nasal polyps     Near syncope     Sleep apnea, obstructive        Past Surgical History:   Procedure Laterality Date    ARTHROSCOPY KNEE      COLONOSCOPY      [place] SURGICAL ARTHROSCOPY SHOULDER REMOVAL LOOSE/FB Right 12/6/2018    Procedure: ARTHROSCOPIC SHOULDER;  Surgeon: Mickey Gaytan MD;  Location: [facility];  Service: Orthopedics    [place] SURGICAL ARTHROSCOPY SHOULDER W/ROTATOR CUFF RPR Right 12/6/2018    Procedure: REPAIR ROTATOR CUFF  ARTHROSCOPIC WITH POSSIBLE REMOVAL LOOSE BODIES AND POSSIBLE DECOMPRESSION;  Surgeon: Mickey Gaytan MD;  Location: [facility];  Service: Orthopedics    SINUS SURGERY           Current Outpatient Medications:     amoxicillin-clavulanate (AUGMENTIN) 875-125 mg per tablet, Take 1 tablet by mouth every 12 (twelve) hours for 7 days, Disp: 14 tablet, Rfl: 0    buPROPion (WELLBUTRIN XL) 300 mg 24 hr tablet, Take 300 mg by mouth every morning, Disp: , Rfl:     busPIRone (BUSPAR) 15 mg tablet, 1 TO 1 AND A HALF TAB TWICE DAILY 8 AM AND 5PM, Disp: , Rfl:     dupilumab (DUPIXENT) subcutaneous injection, Inject 2 mL (300 mg total) under the skin every 14 (fourteen) days, Disp: 4 mL, Rfl: 5    esomeprazole (NexIUM) 20 mg capsule, take 1 capsule by mouth twice a day before meals, Disp: 180 capsule, Rfl: 3    Fluticasone Propionate (Xhance) 93 MCG/ACT EXHU, 2 sprays into each nostril 2 (two) times a day, Disp: 32 mL, Rfl: 5    fluticasone-umeclidinium-vilanterol (Trelegy Ellipta) 100-62.5-25 mcg/actuation inhaler, Inhale 1 puff daily Rinse mouth after use., Disp: 60 blister, Rfl: 7    ibuprofen (MOTRIN) 600 mg tablet, Take 1 tablet (600 mg total) by mouth every 6 (six) hours as needed for moderate pain, Disp: 30 tablet, Rfl: 0    montelukast (SINGULAIR) 10 mg tablet, take 1 tablet by mouth every morning, Disp: 90 tablet, Rfl: 3    Multiple Vitamin (MULTIVITAMIN) tablet, Take 1 tablet by mouth daily,  Disp: , Rfl:     rOPINIRole (REQUIP) 2 mg tablet, Take 1 tablet (2 mg total) by mouth daily at bedtime, Disp: 90 tablet, Rfl: 3    rosuvastatin (CRESTOR) 10 MG tablet, Take 1 tablet (10 mg total) by mouth daily at bedtime, Disp: 90 tablet, Rfl: 3    traZODone (DESYREL) 100 mg tablet, Take 100 mg by mouth daily at bedtime, Disp: , Rfl:     Ventolin  (90 Base) MCG/ACT inhaler, Inhale 2 puffs every 4 (four) hours as needed for wheezing or shortness of breath FOUR Inahlers and 3 refills, Disp: 54 g, Rfl: 3    busPIRone (BUSPAR) 10 mg tablet, Take 10 mg by mouth 2 (two) times a day (Patient not taking: Reported on 10/14/2024), Disp: , Rfl:     cyclobenzaprine (FLEXERIL) 10 mg tablet, Take 1 tablet (10 mg total) by mouth daily at bedtime, Disp: 30 tablet, Rfl: 0    fluticasone-vilanterol (BREO ELLIPTA) 200-25 MCG/INH inhaler, Inhale 1 puff daily Rinse mouth after use. (Patient not taking: Reported on 2023), Disp: 3 each, Rfl: 3    gabapentin (Neurontin) 100 mg capsule, Take 1 capsule (100 mg total) by mouth 2 (two) times a day, Disp: 60 capsule, Rfl: 0    methylPREDNISolone 4 MG tablet therapy pack, Use as directed on package (Patient not taking: Reported on 10/14/2024), Disp: 21 each, Rfl: 0    Myrbetriq 25 MG TB24, take 1 tablet by mouth once daily at bedtime, Disp: 30 tablet, Rfl: 3    oxyCODONE-acetaminophen (Percocet) 5-325 mg per tablet, Take 1 tablet by mouth every 4 (four) hours as needed for moderate pain Max Daily Amount: 6 tablets (Patient not taking: Reported on 2024), Disp: 39 tablet, Rfl: 0    valACYclovir (VALTREX) 500 mg tablet, Take 500 mg by mouth daily, Disp: , Rfl:     No Known Allergies    Social History     Tobacco Use    Smoking status: Former     Current packs/day: 0.00     Average packs/day: 0.5 packs/day for 15.0 years (7.5 ttl pk-yrs)     Types: Cigarettes     Start date:      Quit date:      Years since quittin.8    Smokeless tobacco: Never    Tobacco comments:      smoked 3-4 days    Substance Use Topics    Alcohol use: No         Family History   Problem Relation Age of Onset    Dementia Mother     Kidney disease Mother     Dementia Father     Rheumatologic disease Father     Other Father         rheumatism    No Known Problems Sister     No Known Problems Brother     No Known Problems Maternal Aunt     Vision loss Maternal Uncle     No Known Problems Paternal Aunt     No Known Problems Paternal Uncle     No Known Problems Maternal Grandmother     No Known Problems Maternal Grandfather     No Known Problems Paternal Grandmother     No Known Problems Paternal Grandfather     ADD / ADHD Neg Hx     Anesthesia problems Neg Hx     Cancer Neg Hx     Clotting disorder Neg Hx     Collagen disease Neg Hx     Diabetes Neg Hx     Dislocations Neg Hx     Learning disabilities Neg Hx     Neurological problems Neg Hx     Osteoporosis Neg Hx     Scoliosis Neg Hx     Vascular Disease Neg Hx        Review of Systems   Constitutional:  Positive for appetite change.   HENT:  Positive for postnasal drip, sneezing and sore throat.    Eyes:  Negative for redness.   Respiratory:  Positive for wheezing.    Gastrointestinal:  Negative for abdominal pain.   Endocrine: Negative for polydipsia and polyphagia.   Musculoskeletal:  Positive for myalgias.   Neurological:  Positive for headaches.   Psychiatric/Behavioral:  Negative for decreased concentration.            Vitals:    10/14/24 1047   BP: 122/84   Pulse: 68   Resp: 12   Temp: 98.2 °F (36.8 °C)   SpO2: 96%     Height: 6' (182.9 cm)  IBW (Ideal Body Weight): 77.6 kg  Body mass index is 25.67 kg/m².  Weight (last 2 days)       Date/Time Weight    10/14/24 1047 85.9 (189.3)                Physical Exam  Vitals reviewed.   Constitutional:       General: He is not in acute distress.     Appearance: Normal appearance. He is well-developed.   HENT:      Head: Normocephalic.      Right Ear: External ear normal.      Left Ear: External ear normal.       Nose: Nose normal.      Mouth/Throat:      Mouth: Oropharynx is clear and moist. Mucous membranes are moist.      Pharynx: No oropharyngeal exudate.      Comments: Mallampati score is 2  Eyes:      Conjunctiva/sclera: Conjunctivae normal.      Pupils: Pupils are equal, round, and reactive to light.   Cardiovascular:      Rate and Rhythm: Normal rate and regular rhythm.      Heart sounds: Normal heart sounds.   Pulmonary:      Effort: Pulmonary effort is normal.      Comments: Lung sounds are clear.  No wheezes, crackles or rhonchi  Abdominal:      General: There is no distension.      Palpations: Abdomen is soft.      Tenderness: There is no abdominal tenderness.   Musculoskeletal:      Cervical back: Neck supple.      Comments: No edema, cyanosis or clubbing   Lymphadenopathy:      Cervical: No cervical adenopathy.   Skin:     General: Skin is warm and dry.   Neurological:      General: No focal deficit present.      Mental Status: He is alert and oriented to person, place, and time.   Psychiatric:         Mood and Affect: Mood and affect and mood normal.         Behavior: Behavior normal.         Thought Content: Thought content normal.       Peak flow rate today was 580 L/min..  Predicted peak flow ranges is 470 l/m to 590 L/min

## 2024-10-15 NOTE — ASSESSMENT & PLAN NOTE
Riley does have restless leg syndrome and neurologic prescription for Requip 2 mg which helped him greatly.  I did send new prescription to his pharmacy for the Requip he can take 2 mg at bedtime for his restless leg syndrome

## 2024-10-15 NOTE — ASSESSMENT & PLAN NOTE
I did give him a peak flow meter.  Peak flow rate is 580 L/min and predicted is 470 to  590 L/min.  He does get some intermittent chest tightness recently since he has sinus infection.  I did give him a sample of Trelegy 100 mcg to use 1 puff daily for next week or 2.  If this helps him significantly he can continue this as needed.  Previous had been on Breo before which helped but he longer has it.  Breo is no longer covered by his prescription plan but Trelegy is covered.  I did send prescription for Trelegy to his pharmacy.    I did offer to order him a nebulizer for home use but at this time he did not feel like he needed it.  If he changes his mind he will contact my office.

## 2024-10-15 NOTE — ASSESSMENT & PLAN NOTE
He does have history of chronic rhinosinusitis with history of nasal polyps.  He had started Dupixent 300 mg subcu every 2 weeks in May 2023 and this was prescribed ENT physician Dr. Crum.  He felt this did help but he stopped taking it in July of this year.  He is going to be restarting as he felt it did help him with his chronic rhinosinusitis also help with his asthma.

## 2024-10-16 ENCOUNTER — OFFICE VISIT (OUTPATIENT)
Dept: NEUROLOGY | Facility: CLINIC | Age: 64
End: 2024-10-16
Payer: COMMERCIAL

## 2024-10-16 VITALS
WEIGHT: 189 LBS | DIASTOLIC BLOOD PRESSURE: 88 MMHG | BODY MASS INDEX: 25.63 KG/M2 | SYSTOLIC BLOOD PRESSURE: 130 MMHG | HEART RATE: 56 BPM

## 2024-10-16 DIAGNOSIS — M54.16 RADICULOPATHY, LUMBAR REGION: Primary | ICD-10-CM

## 2024-10-16 PROCEDURE — G2211 COMPLEX E/M VISIT ADD ON: HCPCS | Performed by: PSYCHIATRY & NEUROLOGY

## 2024-10-16 PROCEDURE — 99205 OFFICE O/P NEW HI 60 MIN: CPT | Performed by: PSYCHIATRY & NEUROLOGY

## 2024-10-16 RX ORDER — BUPROPION HYDROCHLORIDE 150 MG/1
150 TABLET ORAL EVERY MORNING
COMMUNITY
Start: 2024-09-17

## 2024-10-16 NOTE — ASSESSMENT & PLAN NOTE
Mr Douglas has pain radiating down the left hamstring and foot to his calf.  Much of his pain has resolved but he still complains of sensory loss in the foot.  I did not see objective sensory deficits but he has an absent left ankle reflex; he may have minimal left hamstring weakness and calf atrophy.  I am concerned about S1 radiculopathy.  Although he improved with physical therapy, he needs an MRI and EMG to further delineate the nature and extent of his problem.  Given his abnormal examination, if he has a high-grade neural compressive lesion further measures may be required.  I explained all of the above to him and answered all of his questions.  He will call me after his testing is completed to discuss the results.  I spent a total of 65 minutes on this visit.

## 2024-10-16 NOTE — PROGRESS NOTES
"Please note: this note was created with voice recognition software. Occasional wrong word or \"sound alike\" substitutions may occur due to the inherent limitations of voice recognition software. Read the chart carefully and recognize (using context) where substitutions may have occurred. I am available to discuss any questions.       1. Radiculopathy, lumbar region  Assessment & Plan:  Mr Douglas has pain radiating down the left hamstring and foot to his calf.  Much of his pain has resolved but he still complains of sensory loss in the foot.  I did not see objective sensory deficits but he has an absent left ankle reflex; he may have minimal left hamstring weakness and calf atrophy.  I am concerned about S1 radiculopathy.  Although he improved with physical therapy, he needs an MRI and EMG to further delineate the nature and extent of his problem.  Given his abnormal examination, if he has a high-grade neural compressive lesion further measures may be required.  I explained all of the above to him and answered all of his questions.  He will call me after his testing is completed to discuss the results.  I spent a total of 65 minutes on this visit.  Orders:  -     MRI lumbar spine without contrast; Future; Expected date: 10/16/2024  -     EMG 2 limb lower extremity; Future      Problem List Items Addressed This Visit       Radiculopathy, lumbar region - Primary     Mr Douglas has pain radiating down the left hamstring and foot to his calf.  Much of his pain has resolved but he still complains of sensory loss in the foot.  I did not see objective sensory deficits but he has an absent left ankle reflex; he may have minimal left hamstring weakness and calf atrophy.  I am concerned about S1 radiculopathy.  Although he improved with physical therapy, he needs an MRI and EMG to further delineate the nature and extent of his problem.  Given his abnormal examination, if he has a high-grade neural compressive lesion " further measures may be required.  I explained all of the above to him and answered all of his questions.  He will call me after his testing is completed to discuss the results.  I spent a total of 65 minutes on this visit.         Relevant Orders    MRI lumbar spine without contrast    EMG 2 limb lower extremity       Problem List       Anxiety    Asthma    Benign prostatic hyperplasia without urinary obstruction    Chronic rhinosinusitis    Overview     He does have history of chronic rhinosinusitis with history of nasal polyps.  He had started Dupixent 300 mg subcu every 2 weeks in May 2023 and this was prescribed ENT physician Dr. Crum.  He felt this did help but he stopped taking it in July of this year.  He is going to be restarting as he felt it did help him with his chronic rhinosinusitis also help with his asthma.         Complete tear of right rotator cuff    Overview     Added automatically from request for surgery [id number]         Depression, recurrent (HCC)    Elevated BP without diagnosis of hypertension    Excessive daytime sleepiness    Hypercholesterolemia    Moderate persistent asthma, uncomplicated     TAJ (obstructive sleep apnea)    Radiculopathy, lumbar region    Current Assessment & Plan     Mr Douglas has pain radiating down the left hamstring and foot to his calf.  Much of his pain has resolved but he still complains of sensory loss in the foot.  I did not see objective sensory deficits but he has an absent left ankle reflex; he may have minimal left hamstring weakness and calf atrophy.  I am concerned about S1 radiculopathy.  Although he improved with physical therapy, he needs an MRI and EMG to further delineate the nature and extent of his problem.  Given his abnormal examination, if he has a high-grade neural compressive lesion further measures may be required.  I explained all of the above to him and answered all of his questions.  He will call me after his testing is completed  "to discuss the results.  I spent a total of 65 minutes on this visit.         Restless leg syndrome    Simple chronic bronchitis (HCC)    Snoring    Testicular hypogonadism         I had the pleasure of seeing Marc Douglas, a 63 y.o. male, for neuromuscular consultation. The referral was for numbness.     In August 2023 he developed left buttock pain.  It radiated through the hamstring, the calf, and sole without right-sided involvement.  He denied weakness, incontinence, or numbness (initially).  He was treated with gabapentin, OxyContin, ibuprofen, a short course of oral steroids, and a \"muscle relaxer\" with marked improvement over several months.  Although his pain is much better, he is now aware of \"numbness\" involving the lateral sole.  It was difficult for him to describe what this felt like but he was able to endorse diminished sensation as if he was walking on sponges.  He still has minimal leg pain but not in the foot.  Sometimes it feels \"like I do not have my whole foot on the ground\".  He has a prior history of back pain that was mostly on the right, but this is not particularly bothersome to him.    He has a history of BPH, asthma, GERD, and episodes of high blood pressure.  His mother had a stroke; his father had restless leg syndrome.      Past Medical History:   Diagnosis Date    Allergic     Anxiety     Asthma     COPD (chronic obstructive pulmonary disease) (HCC)     Depression     GERD (gastroesophageal reflux disease)     Hyperlipidemia     Nasal polyps     Near syncope     Sleep apnea, obstructive        Past Surgical History:   Procedure Laterality Date    ARTHROSCOPY KNEE      COLONOSCOPY      OR SURGICAL ARTHROSCOPY SHOULDER REMOVAL LOOSE/FB Right 12/6/2018    Procedure: ARTHROSCOPIC SHOULDER;  Surgeon: Mickey Gaytan MD;  Location: [facility];  Service: Orthopedics    OR SURGICAL ARTHROSCOPY SHOULDER W/ROTATOR CUFF RPR Right 12/6/2018    Procedure: REPAIR ROTATOR CUFF  " ARTHROSCOPIC WITH POSSIBLE REMOVAL LOOSE BODIES AND POSSIBLE DECOMPRESSION;  Surgeon: Mickey Gaytan MD;  [facility];  Service: Orthopedics    SINUS SURGERY         Patient has no known allergies.    Social History     Tobacco Use   Smoking Status Former    Current packs/day: 0.00    Average packs/day: 0.5 packs/day for 15.0 years (7.5 ttl pk-yrs)    Types: Cigarettes    Start date:     Quit date:     Years since quittin.8   Smokeless Tobacco Never   Tobacco Comments    smoked 3-4 days        Social History     Substance and Sexual Activity   Alcohol Use No       Social History     Substance and Sexual Activity   Drug Use No       Family History   Problem Relation Age of Onset    Dementia Mother     Kidney disease Mother     Dementia Father     Rheumatologic disease Father     Other Father         rheumatism    No Known Problems Sister     No Known Problems Brother     No Known Problems Maternal Aunt     Vision loss Maternal Uncle     No Known Problems Paternal Aunt     No Known Problems Paternal Uncle     No Known Problems Maternal Grandmother     No Known Problems Maternal Grandfather     No Known Problems Paternal Grandmother     No Known Problems Paternal Grandfather     ADD / ADHD Neg Hx     Anesthesia problems Neg Hx     Cancer Neg Hx     Clotting disorder Neg Hx     Collagen disease Neg Hx     Diabetes Neg Hx     Dislocations Neg Hx     Learning disabilities Neg Hx     Neurological problems Neg Hx     Osteoporosis Neg Hx     Scoliosis Neg Hx     Vascular Disease Neg Hx          Current Outpatient Medications:     buPROPion (WELLBUTRIN XL) 150 mg 24 hr tablet, Take 150 mg by mouth every morning, Disp: , Rfl:     buPROPion (WELLBUTRIN XL) 300 mg 24 hr tablet, Take 300 mg by mouth every morning, Disp: , Rfl:     busPIRone (BUSPAR) 15 mg tablet, 1 TO 1 AND A HALF TAB TWICE DAILY 8 AM AND 5PM, Disp: , Rfl:     dupilumab (DUPIXENT) subcutaneous injection, Inject 2 mL (300 mg total) under  the skin every 14 (fourteen) days, Disp: 4 mL, Rfl: 5    esomeprazole (NexIUM) 20 mg capsule, take 1 capsule by mouth twice a day before meals, Disp: 180 capsule, Rfl: 3    Fluticasone Propionate (Xhance) 93 MCG/ACT EXHU, 2 sprays into each nostril 2 (two) times a day, Disp: 32 mL, Rfl: 5    fluticasone-umeclidinium-vilanterol (Trelegy Ellipta) 100-62.5-25 mcg/actuation inhaler, Inhale 1 puff daily Rinse mouth after use., Disp: 60 blister, Rfl: 7    ibuprofen (MOTRIN) 600 mg tablet, Take 1 tablet (600 mg total) by mouth every 6 (six) hours as needed for moderate pain, Disp: 30 tablet, Rfl: 0    montelukast (SINGULAIR) 10 mg tablet, take 1 tablet by mouth every morning, Disp: 90 tablet, Rfl: 3    Multiple Vitamin (MULTIVITAMIN) tablet, Take 1 tablet by mouth daily, Disp: , Rfl:     rOPINIRole (REQUIP) 2 mg tablet, Take 1 tablet (2 mg total) by mouth daily at bedtime, Disp: 90 tablet, Rfl: 3    rosuvastatin (CRESTOR) 10 MG tablet, Take 1 tablet (10 mg total) by mouth daily at bedtime, Disp: 90 tablet, Rfl: 3    traZODone (DESYREL) 100 mg tablet, Take 100 mg by mouth daily at bedtime, Disp: , Rfl:     Ventolin  (90 Base) MCG/ACT inhaler, Inhale 2 puffs every 4 (four) hours as needed for wheezing or shortness of breath FOUR Inahlers and 3 refills, Disp: 54 g, Rfl: 3    busPIRone (BUSPAR) 10 mg tablet, Take 10 mg by mouth 2 (two) times a day (Patient not taking: Reported on 10/16/2024), Disp: , Rfl:     cyclobenzaprine (FLEXERIL) 10 mg tablet, Take 1 tablet (10 mg total) by mouth daily at bedtime, Disp: 30 tablet, Rfl: 0    fluticasone-vilanterol (BREO ELLIPTA) 200-25 MCG/INH inhaler, Inhale 1 puff daily Rinse mouth after use., Disp: 3 each, Rfl: 3    gabapentin (Neurontin) 100 mg capsule, Take 1 capsule (100 mg total) by mouth 2 (two) times a day, Disp: 60 capsule, Rfl: 0    methylPREDNISolone 4 MG tablet therapy pack, Use as directed on package (Patient not taking: Reported on 10/14/2024), Disp: 21 each, Rfl:  0    Myrbetriq 25 MG TB24, take 1 tablet by mouth once daily at bedtime, Disp: 30 tablet, Rfl: 3    oxyCODONE-acetaminophen (Percocet) 5-325 mg per tablet, Take 1 tablet by mouth every 4 (four) hours as needed for moderate pain Max Daily Amount: 6 tablets, Disp: 39 tablet, Rfl: 0    valACYclovir (VALTREX) 500 mg tablet, Take 500 mg by mouth daily, Disp: , Rfl:     No visits with results within 6 Month(s) from this visit.   Latest known visit with results is:   Appointment on 02/08/2024   Component Date Value Ref Range Status    Sodium 02/08/2024 141  135 - 147 mmol/L Final    Potassium 02/08/2024 3.8  3.5 - 5.3 mmol/L Final    Chloride 02/08/2024 104  96 - 108 mmol/L Final    CO2 02/08/2024 28  21 - 32 mmol/L Final    ANION GAP 02/08/2024 9  mmol/L Final    BUN 02/08/2024 19  5 - 25 mg/dL Final    Creatinine 02/08/2024 1.10  0.60 - 1.30 mg/dL Final    Standardized to IDMS reference method    Glucose, Fasting 02/08/2024 80  65 - 99 mg/dL Final    Calcium 02/08/2024 8.9  8.4 - 10.2 mg/dL Final    AST 02/08/2024 27  13 - 39 U/L Final    ALT 02/08/2024 21  7 - 52 U/L Final    Specimen collection should occur prior to Sulfasalazine administration due to the potential for falsely depressed results.     Alkaline Phosphatase 02/08/2024 67  34 - 104 U/L Final    Total Protein 02/08/2024 6.7  6.4 - 8.4 g/dL Final    Albumin 02/08/2024 4.4  3.5 - 5.0 g/dL Final    Total Bilirubin 02/08/2024 0.82  0.20 - 1.00 mg/dL Final    Use of this assay is not recommended for patients undergoing treatment with eltrombopag due to the potential for falsely elevated results.  N-acetyl-p-benzoquinone imine (metabolite of Acetaminophen) will generate erroneously low results in samples for patients that have taken an overdose of Acetaminophen.    eGFR 02/08/2024 72  ml/min/1.73sq m Final    Cholesterol 02/08/2024 155  See Comment mg/dL Final    Cholesterol:         Pediatric <18 Years        Desirable          <170 mg/dL      Borderline High     170-199 mg/dL      High               >=200 mg/dL        Adult >=18 Years            Desirable         <200 mg/dL      Borderline High   200-239 mg/dL      High              >239 mg/dL      Triglycerides 02/08/2024 79  See Comment mg/dL Final    Triglyceride:     0-9Y            <75mg/dL     10Y-17Y         <90 mg/dL       >=18Y     Normal          <150 mg/dL     Borderline High 150-199 mg/dL     High            200-499 mg/dL        Very High       >499 mg/dL    Specimen collection should occur prior to Metamizole administration due to the potential for falsely depressed results.    HDL, Direct 02/08/2024 42  >=40 mg/dL Final    LDL Calculated 02/08/2024 97  0 - 100 mg/dL Final    LDL Cholesterol:     Optimal           <100 mg/dl     Near Optimal      100-129 mg/dl     Above Optimal       Borderline High 130-159 mg/dl       High            160-189 mg/dl       Very High       >189 mg/dl         This screening LDL is a calculated result.   It does not have the accuracy of the Direct Measured LDL in the monitoring of patients with hyperlipidemia and/or statin therapy.   Direct Measure LDL (GJH089) must be ordered separately in these patients.    Non-HDL-Chol (CHOL-HDL) 02/08/2024 113  mg/dl Final    TSH 3RD GENERATON 02/08/2024 1.284  0.450 - 4.500 uIU/mL Final    Adult TSH (3rd generation) reference range follows the recommended guidelines of the [employer] Thy[employer] Association, January, 2020.        No results found for this or any previous visit.     No results found for this or any previous visit.    No results found for this or any previous visit.    No results found for this or any previous visit.    No results found for this or any previous visit.    No results found for this or any previous visit.    No results found for this or any previous visit.    No results found for this or any previous visit.    No results found for this or any previous visit.    No results found for this or any previous visit.    No  results found for this or any previous visit.    No results found for this or any previous visit.      Review of Systems      On examination,     Blood pressure 130/88, pulse 56, weight 85.7 kg (189 lb).    Well developed, well nourished, in no acute distress    Normocephalic, atraumatic    Heart: regular rate and rhythm  I did not hear a carotid bruit    Extremities: no clubbing, cyanosis, or edema    Speech and cognition appeared normal    Cranial nerves:  II: Pupils equal, round, and reactive to light. No gross visual field defect. I did not appreciate optic disc edema.  III, IV, VI: Extraocular movements intact  V: Normal facial sensation in all three divisions of the trigeminal nerve bilaterally  VII: Normal facial strength  VIII: Hearing intact to finger rub bilaterally  IX, X: Palate elevated symmetrically  XI: Sternocleidomastoid strength normal bilaterally  XII: Tongue protruded in midline without atrophy or fibrillations    Motor:  May have minimal left calf atrophy; otherwise, normal tone and bulk throughout.   Muscle strength testing by the MRC scale (listed below) was 5/5 in the deltoid, biceps, triceps, wrist extensors, wrist flexors, finger extensors, finger flexors, hip flexors, quadriceps, ankle dorsiflexors, ankle plantar flexors, and EHL bilaterally  May have minimal left hamstring weakness  MRC scale:  0/5 No contraction is present   1/5 Incomplete range of active motion, even when gravity is eliminated   2/5 Complete active range of motion with gravity eliminated   3/5 Full motion against gravity, but not with any resistance  4/5 The patient is able to complete the action against gravity and some resistance by the examiner   5/5 Completely normal strength, overcoming gravity and all resistance by the examiner     Deep tendon reflexes:   2+ and symmetrical in the biceps, triceps, brachioradialis, patellas  2+ in the right ankle, absent on the left  Toes downgoing (no Babinski sign)  No Calzada's  sign    Sensation: Normal pinprick and light touch throughout    Cerebellar: normal finger to nose and heel to shin testing    Gait: Normal             There are no Patient Instructions on file for this visit.      Thank you very much for allowing me to participate in your patient's care. Please feel free to contact me for any questions or concerns. Please be aware of the inherent limitations of voice recognition software, which may result in transcriptional errors.    Darian Lamar MD

## 2024-10-21 ENCOUNTER — TELEPHONE (OUTPATIENT)
Age: 64
End: 2024-10-21

## 2024-10-21 NOTE — TELEPHONE ENCOUNTER
Pt called to advise he will be having EMG on October 28th at [facility] in [place] and they are requesting the EMG order to be faxed to [phone].

## 2024-11-03 ENCOUNTER — HOSPITAL ENCOUNTER (OUTPATIENT)
Dept: RADIOLOGY | Facility: HOSPITAL | Age: 64
Discharge: HOME | End: 2024-11-03
Payer: COMMERCIAL

## 2024-11-03 DIAGNOSIS — M54.16 RADICULOPATHY, LUMBAR REGION: ICD-10-CM

## 2024-11-03 PROCEDURE — 72148 MRI LUMBAR SPINE W/O DYE: CPT

## 2024-11-07 ENCOUNTER — OFFICE VISIT (OUTPATIENT)
Dept: NEUROLOGY | Facility: CLINIC | Age: 64
End: 2024-11-07
Payer: COMMERCIAL

## 2024-11-07 VITALS
SYSTOLIC BLOOD PRESSURE: 112 MMHG | BODY MASS INDEX: 26.07 KG/M2 | WEIGHT: 192.2 LBS | OXYGEN SATURATION: 98 % | TEMPERATURE: 98.2 F | HEART RATE: 67 BPM | DIASTOLIC BLOOD PRESSURE: 74 MMHG

## 2024-11-07 DIAGNOSIS — M54.16 RADICULOPATHY, LUMBAR REGION: Primary | ICD-10-CM

## 2024-11-07 PROCEDURE — 99213 OFFICE O/P EST LOW 20 MIN: CPT | Performed by: PSYCHIATRY & NEUROLOGY

## 2024-11-07 NOTE — ASSESSMENT & PLAN NOTE
Mr Stella has symptoms that are in keeping with lumbar radiculopathy.  His MRI does not show a high-grade neural compressive abnormality.  Therefore, I think the conservative care is appropriate.  He has only minimal pain so I do not think that more aggressive interventional procedures are likely to be helpful right now.  He will help me to obtain his EMG results but they may not make a difference as far as his management right now.  All of his questions were answered and he is in agreement with this plan.

## 2024-11-07 NOTE — PROGRESS NOTES
"Ambulatory Visit  Name: Marc Douglas      : 1960      MRN: [id number]  Encounter Provider: Darian Lamar MD  Encounter Date: 2024   Encounter department: [facility] [place]    Assessment & Plan  Radiculopathy, lumbar region  Mr Douglas has symptoms that are in keeping with lumbar radiculopathy.  His MRI does not show a high-grade neural compressive abnormality.  Therefore, I think the conservative care is appropriate.  He has only minimal pain so I do not think that more aggressive interventional procedures are likely to be helpful right now.  He will help me to obtain his EMG results but they may not make a difference as far as his management right now.  All of his questions were answered and he is in agreement with this plan.             History of Present Illness   HPI    In 2023 he developed left buttock pain.  It radiated through the hamstring, the calf, and sole without right-sided involvement.  He denied weakness, incontinence, or numbness (initially).  He was treated with gabapentin, OxyContin, ibuprofen, a short course of oral steroids, and a \"muscle relaxer\" with marked improvement over several months.  Although his pain is much better, he is now aware of \"numbness\" involving the lateral sole.  It was difficult for him to describe what this felt like but he was able to endorse diminished sensation as if he was walking on sponges.  He still has minimal leg pain but not in the foot.  Sometimes it feels \"like I do not have my whole foot on the ground\".  He has a prior history of back pain that was mostly on the right, but this is not particularly bothersome to him.      He has a history of BPH, asthma, GERD, and episodes of high blood pressure.  His mother had a stroke; his father had restless leg syndrome.    He had a lumbar MRI which I personally reviewed which showed degenerative changes but without a high-grade neural compressive abnormality.  " He had an EMG through an orthopedist in [place] but the results are not yet available to me.        Review of Systems   Constitutional:  Negative for appetite change, fatigue and fever.   HENT: Negative.  Negative for hearing loss, tinnitus, trouble swallowing and voice change.    Eyes: Negative.  Negative for photophobia, pain and visual disturbance.   Respiratory: Negative.  Negative for shortness of breath.    Cardiovascular: Negative.  Negative for palpitations.   Gastrointestinal: Negative.  Negative for nausea and vomiting.   Endocrine: Negative.  Negative for cold intolerance.   Genitourinary: Negative.  Negative for dysuria, frequency and urgency.   Musculoskeletal:  Negative for back pain, gait problem, myalgias, neck pain and neck stiffness.   Skin: Negative.  Negative for rash.   Allergic/Immunologic: Negative.    Neurological: Negative.  Negative for dizziness, tremors, seizures, syncope, facial asymmetry, speech difficulty, weakness, light-headedness, numbness and headaches.   Hematological: Negative.  Does not bruise/bleed easily.   Psychiatric/Behavioral: Negative.  Negative for confusion, hallucinations and sleep disturbance.      I have personally reviewed the MA's review of systems and made changes as necessary.      Objective     /74 (BP Location: Right arm, Patient Position: Sitting, Cuff Size: Adult)   Pulse 67   Temp 98.2 °F (36.8 °C) (Temporal)   Wt 87.2 kg (192 lb 3.2 oz)   SpO2 98%   BMI 26.07 kg/m²     Physical Exam  Neurologic Exam    Well developed, well nourished, in no acute distress     Normocephalic, atraumatic       Speech and cognition appeared normal     Cranial nerves:  II: Pupils equal, round, and reactive to light. No gross visual field defect. I did not appreciate optic disc edema.  III, IV, VI: Extraocular movements intact  V: Normal facial sensation in all three divisions of the trigeminal nerve bilaterally  VII: Normal facial strength  VIII: Hearing intact to  finger rub bilaterally  IX, X: Palate elevated symmetrically  XI: Sternocleidomastoid strength normal bilaterally  XII: Tongue protruded in midline without atrophy or fibrillations     Motor:  May have minimal left calf atrophy; otherwise, normal tone and bulk throughout.   Muscle strength testing by the MRC scale (listed below) was 5/5 in the deltoid, biceps, triceps, wrist extensors, wrist flexors, finger extensors, finger flexors, hip flexors, quadriceps, ankle dorsiflexors, ankle plantar flexors, and EHL bilaterally  May have minimal left hamstring weakness  MRC scale:     0/5 No contraction is present   1/5 Incomplete range of active motion, even when gravity is eliminated   2/5 Complete active range of motion with gravity eliminated   3/5 Full motion against gravity, but not with any resistance  4/5 The patient is able to complete the action against gravity and some resistance by the examiner   5/5 Completely normal strength, overcoming gravity and all resistance by the examiner      Deep tendon reflexes:   2+ and symmetrical in the biceps, triceps, brachioradialis, patellas  2+ in the right ankle, absent on the left  Toes downgoing (no Babinski sign)  No Calzada's sign     Sensation: Normal pinprick and light touch throughout     Cerebellar: normal finger to nose and heel to shin testing     Gait: Normal        Results/Data:  I have reviewed the results and images from the MRI in detail with the patient.

## 2024-11-11 ENCOUNTER — TELEPHONE (OUTPATIENT)
Age: 64
End: 2024-11-11

## 2024-11-11 NOTE — TELEPHONE ENCOUNTER
Pt is calling stating EMG results from [facility] [place] were just faxed a few minutes ago. Pt was advised we have not received them as of yet. Confirmed fax # of [phone].        Pt stated he will send a message via [employer] (?????) marielos and a sent the results that way.

## 2025-01-20 ENCOUNTER — RA CDI HCC (OUTPATIENT)
Dept: OTHER | Facility: HOSPITAL | Age: 65
End: 2025-01-20

## 2025-01-27 ENCOUNTER — OFFICE VISIT (OUTPATIENT)
Dept: FAMILY MEDICINE CLINIC | Facility: CLINIC | Age: 65
End: 2025-01-27
Payer: COMMERCIAL

## 2025-01-27 VITALS
HEART RATE: 64 BPM | SYSTOLIC BLOOD PRESSURE: 110 MMHG | BODY MASS INDEX: 27.09 KG/M2 | TEMPERATURE: 97.3 F | RESPIRATION RATE: 12 BRPM | DIASTOLIC BLOOD PRESSURE: 72 MMHG | HEIGHT: 72 IN | WEIGHT: 200 LBS | OXYGEN SATURATION: 97 %

## 2025-01-27 DIAGNOSIS — J41.0 SIMPLE CHRONIC BRONCHITIS (HCC): ICD-10-CM

## 2025-01-27 DIAGNOSIS — Z12.5 SCREENING FOR PROSTATE CANCER: ICD-10-CM

## 2025-01-27 DIAGNOSIS — F33.9 DEPRESSION, RECURRENT (HCC): ICD-10-CM

## 2025-01-27 DIAGNOSIS — E78.00 HYPERCHOLESTEROLEMIA: Primary | ICD-10-CM

## 2025-01-27 DIAGNOSIS — Z00.00 MEDICARE ANNUAL WELLNESS VISIT, SUBSEQUENT: ICD-10-CM

## 2025-01-27 DIAGNOSIS — D69.6 THROMBOCYTOPENIA (HCC): ICD-10-CM

## 2025-01-27 PROCEDURE — G2211 COMPLEX E/M VISIT ADD ON: HCPCS | Performed by: FAMILY MEDICINE

## 2025-01-27 PROCEDURE — 99214 OFFICE O/P EST MOD 30 MIN: CPT | Performed by: FAMILY MEDICINE

## 2025-01-27 PROCEDURE — G0439 PPPS, SUBSEQ VISIT: HCPCS | Performed by: FAMILY MEDICINE

## 2025-01-27 RX ORDER — MINOXIDIL 2.5 MG/1
TABLET ORAL EVERY 12 HOURS
COMMUNITY
Start: 2024-10-18

## 2025-01-27 RX ORDER — DUTASTERIDE 0.5 MG/1
1 CAPSULE, LIQUID FILLED ORAL DAILY
COMMUNITY
Start: 2024-11-05

## 2025-01-27 RX ORDER — TESTOSTERONE 20.25 MG/1.25G
GEL TOPICAL
COMMUNITY
Start: 2024-12-18

## 2025-01-27 NOTE — ASSESSMENT & PLAN NOTE
Depression Screening Follow-up Plan: Patient's depression screening was positive with a PHQ-9 score of 11. Continue regular follow-up with their psychologist/therapist/psychiatrist who is managing their mental health condition(s).

## 2025-01-27 NOTE — PROGRESS NOTES
Name: Marc Douglas      : 1960      MRN: [id number]  Encounter Provider: Loren Mcelroy MD  Encounter Date: 2025   Encounter department: [facility]    Assessment & Plan  Medicare annual wellness visit, subsequent         Hypercholesterolemia  Stable  Cont med  Orders:  •  Lipid panel; Future  •  Comprehensive metabolic panel; Future    Simple chronic bronchitis (HCC)  Stable  Under pulmonologist care  Cont Trelegy        Depression, recurrent (HCC)    Depression Screening Follow-up Plan: Patient's depression screening was positive with a PHQ-9 score of 11. Continue regular follow-up with their psychologist/therapist/psychiatrist who is managing their mental health condition(s).         Screening for prostate cancer    Orders:  •  PSA, Total Screen; Future    Thrombocytopenia (HCC)  Will repeat CBC   Orders:  •  CBC; Future      Depression Screening and Follow-up Plan: Patient's depression screening was positive with a PHQ-9 score of 11.   Continue regular follow-up with their mental health provider who is managing their mental health condition(s).     Preventive health issues were discussed with patient, and age appropriate screening tests were ordered as noted in patient's After Visit Summary. Personalized health advice and appropriate referrals for health education or preventive services given if needed, as noted in patient's After Visit Summary.    History of Present Illness     Pt is seeing for f/u HLD, COPD, Depression -  all stable -  under pulmonologist and psych care  -  labs in the chart reviewed -  had platelets count 145 K last month        Patient Care Team:  Loren Mcelroy MD as PCP - General (Family Medicine)    Review of Systems   Constitutional: Negative.    Respiratory: Negative.     Cardiovascular: Negative.    Gastrointestinal: Negative.    Genitourinary: Negative.    Musculoskeletal:  Positive for back pain.   Skin: Negative.    Neurological:  Negative.    Psychiatric/Behavioral:  Positive for dysphoric mood.         Under psych care      Medical History Reviewed by provider this encounter:  Tobacco  Allergies  Meds  Problems  Med Hx  Surg Hx  Fam Hx       Annual Wellness Visit Questionnaire   Marc is here for his Subsequent Wellness visit.     Health Risk Assessment:   Patient rates overall health as good. Patient feels that their physical health rating is same. Patient is dissatisfied with their life. Eyesight was rated as slightly worse. Hearing was rated as slightly worse. Patient feels that their emotional and mental health rating is slightly better. Patients states they are sometimes angry. Patient states they are often unusually tired/fatigued. Pain experienced in the last 7 days has been some. Patient's pain rating has been 6/10. Patient states that he has experienced weight loss or gain in last 6 months.     Depression Screening:   PHQ-9 Score: 11      Fall Risk Screening:   In the past year, patient has experienced: no history of falling in past year      Home Safety:  Patient does not have trouble with stairs inside or outside of their home. Patient has no working smoke alarms and has no working carbon monoxide detector. Home safety hazards include: none. I do not have a home I've been hosted for several months now    Nutrition:   Current diet is Frequent junk food.     Medications:   Patient is currently taking over-the-counter supplements. OTC medications include: see medication list. Patient is able to manage medications.     Activities of Daily Living (ADLs)/Instrumental Activities of Daily Living (IADLs):   Walk and transfer into and out of bed and chair?: Yes  Dress and groom yourself?: Yes    Bathe or shower yourself?: Yes    Feed yourself? Yes  Do your laundry/housekeeping?: Yes  Manage your money, pay your bills and track your expenses?: Yes  Make your own meals?: Yes    Do your own shopping?: Yes    Previous Hospitalizations:    Any hospitalizations or ED visits within the last 12 months?: No      Advance Care Planning:   Living will: No    Durable POA for healthcare: No    Advanced directive: No      Cognitive Screening:   Provider or family/friend/caregiver concerned regarding cognition?: No    PREVENTIVE SCREENINGS      Cardiovascular Screening:    General: Screening Not Indicated and History Lipid Disorder    Due for: Lipid Panel      Diabetes Screening:     General: Screening Current    Due for: Blood Glucose      Colorectal Cancer Screening:     General: Screening Current      Prostate Cancer Screening:    General: Screening Not Indicated      Osteoporosis Screening:    General: Screening Not Indicated      Abdominal Aortic Aneurysm (AAA) Screening:    Risk factors include: tobacco use        General: Screening Not Indicated      Lung Cancer Screening:     General: Screening Not Indicated      Hepatitis C Screening:    General: Screening Not Indicated    Screening, Brief Intervention, and Referral to Treatment (SBIRT)    Screening  Typical number of drinks in a day: 0  Typical number of drinks in a week: 2  Interpretation: Low risk drinking behavior.    AUDIT-C Screenin) How often did you have a drink containing alcohol in the past year? 2 to 4 times a month  2) How many drinks did you have on a typical day when you were drinking in the past year? 0  3) How often did you have 6 or more drinks on one occasion in the past year? never    AUDIT-C Score: 2  Interpretation: Score 0-3 (male): Negative screen for alcohol misuse    Single Item Drug Screening:  How often have you used an illegal drug (including marijuana) or a prescription medication for non-medical reasons in the past year? never    Single Item Drug Screen Score: 0  Interpretation: Negative screen for possible drug use disorder    SDOH Risk Assessment  Social determinants of health (SDOH) risk assesment tool was completed. The tool at a minimum covered housing  stability, food insecurity, transportation needs, and utility difficulty. Patient had at risk responses for the following SDOH domains: housing stability.     Social Drivers of Health     Financial Resource Strain: High Risk (1/24/2024)    Overall Financial Resource Strain (CARDIA)    • Difficulty of Paying Living Expenses: Hard   Food Insecurity: No Food Insecurity (1/27/2025)    Hunger Vital Sign    • Worried About Running Out of Food in the Last Year: Never true    • Ran Out of Food in the Last Year: Never true   Recent Concern: Food Insecurity - Food Insecurity Present (1/26/2025)    Hunger Vital Sign    • Worried About Running Out of Food in the Last Year: Often true    • Ran Out of Food in the Last Year: Often true   Transportation Needs: No Transportation Needs (1/27/2025)    PRAPARE - Transportation    • Lack of Transportation (Medical): No    • Lack of Transportation (Non-Medical): No   Recent Concern: Transportation Needs - Unmet Transportation Needs (1/26/2025)    PRAPARE - Transportation    • Lack of Transportation (Medical): Yes    • Lack of Transportation (Non-Medical): Yes   Housing Stability: High Risk (1/27/2025)    Housing Stability Vital Sign    • Unable to Pay for Housing in the Last Year: No    • Number of Times Moved in the Last Year: 3    • Homeless in the Last Year: No   Utilities: Not At Risk (1/27/2025)    [facility] Utilities    • Threatened with loss of utilities: No     No results found.    Objective   /72 (BP Location: Left arm, Patient Position: Sitting, Cuff Size: Large)   Pulse 64   Temp (!) 97.3 °F (36.3 °C) (Temporal)   Resp 12   Ht 6' (1.829 m)   Wt 90.7 kg (200 lb)   SpO2 97%   BMI 27.12 kg/m²     Physical Exam  Constitutional:       General: He is not in acute distress.     Appearance: He is not ill-appearing.   Cardiovascular:      Rate and Rhythm: Normal rate and regular rhythm.      Heart sounds: No murmur heard.     No gallop.   Pulmonary:      Effort: Pulmonary effort  is normal. No respiratory distress.      Breath sounds: No wheezing, rhonchi or rales.   Musculoskeletal:      Right lower leg: No edema.      Left lower leg: No edema.   Neurological:      Mental Status: He is alert and oriented to person, place, and time.      Cranial Nerves: No cranial nerve deficit.      Motor: No weakness.      Gait: Gait normal.   Psychiatric:         Mood and Affect: Mood normal.         Thought Content: Thought content normal.         Judgment: Judgment normal.

## 2025-01-27 NOTE — PATIENT INSTRUCTIONS
Medicare Preventive Visit Patient Instructions  Thank you for completing your Welcome to Medicare Visit or Medicare Annual Wellness Visit today. Your next wellness visit will be due in one year (1/28/2026).  The screening/preventive services that you may require over the next 5-10 years are detailed below. Some tests may not apply to you based off risk factors and/or age. Screening tests ordered at today's visit but not completed yet may show as past due. Also, please note that scanned in results may not display below.  Preventive Screenings:  Service Recommendations Previous Testing/Comments   Colorectal Cancer Screening  Colonoscopy    Fecal Occult Blood Test (FOBT)/Fecal Immunochemical Test (FIT)  Fecal DNA/Cologuard Test  Flexible Sigmoidoscopy Age: 45-75 years old   Colonoscopy: every 10 years (May be performed more frequently if at higher risk)  OR  FOBT/FIT: every 1 year  OR  Cologuard: every 3 years  OR  Sigmoidoscopy: every 5 years  Screening may be recommended earlier than age 45 if at higher risk for colorectal cancer. Also, an individualized decision between you and your healthcare provider will decide whether screening between the ages of 76-85 would be appropriate. Colonoscopy: 06/16/2016  FOBT/FIT: Not on file  Cologuard: Not on file  Sigmoidoscopy: Not on file    Screening Current     Prostate Cancer Screening Individualized decision between patient and health care provider in men between ages of 55-69   Medicare will cover every 12 months beginning on the day after your 50th birthday PSA: 0.6 ng/mL           Hepatitis C Screening Once for adults born between 1945 and 1965  More frequently in patients at high risk for Hepatitis C Hep C Antibody: Not on file        Diabetes Screening 1-2 times per year if you're at risk for diabetes or have pre-diabetes Fasting glucose: 80 mg/dL (2/8/2024)  A1C: No results in last 5 years (No results in last 5 years)  Screening Current   Cholesterol Screening Once  every 5 years if you don't have a lipid disorder. May order more often based on risk factors. Lipid panel: 02/08/2024  Screening Not Indicated  History Lipid Disorder      Other Preventive Screenings Covered by Medicare:  Abdominal Aortic Aneurysm (AAA) Screening: covered once if your at risk. You're considered to be at risk if you have a family history of AAA or a male between the age of 65-75 who smoking at least 100 cigarettes in your lifetime.  Lung Cancer Screening: covers low dose CT scan once per year if you meet all of the following conditions: (1) Age 55-77; (2) No signs or symptoms of lung cancer; (3) Current smoker or have quit smoking within the last 15 years; (4) You have a tobacco smoking history of at least 20 pack years (packs per day x number of years you smoked); (5) You get a written order from a healthcare provider.  Glaucoma Screening: covered annually if you're considered high risk: (1) You have diabetes OR (2) Family history of glaucoma OR (3)  aged 50 and older OR (4)  American aged 65 and older  Osteoporosis Screening: covered every 2 years if you meet one of the following conditions: (1) Have a vertebral abnormality; (2) On glucocorticoid therapy for more than 3 months; (3) Have primary hyperparathyroidism; (4) On osteoporosis medications and need to assess response to drug therapy.  HIV Screening: covered annually if you're between the age of 15-65. Also covered annually if you are younger than 15 and older than 65 with risk factors for HIV infection. For pregnant patients, it is covered up to 3 times per pregnancy.    Immunizations:  Immunization Recommendations   Influenza Vaccine Annual influenza vaccination during flu season is recommended for all persons aged >= 6 months who do not have contraindications   Pneumococcal Vaccine   * Pneumococcal conjugate vaccine = PCV13 (Prevnar 13), PCV15 (Vaxneuvance), PCV20 (Prevnar 20)  * Pneumococcal polysaccharide vaccine  = PPSV23 (Pneumovax) Adults 19-65 yo with certain risk factors or if 65+ yo  If never received any pneumonia vaccine: recommend Prevnar 20 (PCV20)  Give PCV20 if previously received 1 dose of PCV13 or PPSV23   Hepatitis B Vaccine 3 dose series if at intermediate or high risk (ex: diabetes, end stage renal disease, liver disease)   Respiratory syncytial virus (RSV) Vaccine - COVERED BY MEDICARE PART D  * RSVPreF3 (Arexvy) CDC recommends that adults 60 years of age and older may receive a single dose of RSV vaccine using shared clinical decision-making (SCDM)   Tetanus (Td) Vaccine - COST NOT COVERED BY MEDICARE PART B Following completion of primary series, a booster dose should be given every 10 years to maintain immunity against tetanus. Td may also be given as tetanus wound prophylaxis.   Tdap Vaccine - COST NOT COVERED BY MEDICARE PART B Recommended at least once for all adults. For pregnant patients, recommended with each pregnancy.   Shingles Vaccine (Shingrix) - COST NOT COVERED BY MEDICARE PART B  2 shot series recommended in those 19 years and older who have or will have weakened immune systems or those 50 years and older     Health Maintenance Due:      Topic Date Due   • Hepatitis C Screening  Never done   • HIV Screening  Never done   • Colorectal Cancer Screening  06/16/2026     Immunizations Due:  There are no preventive care reminders to display for this patient.  Advance Directives   What are advance directives?  Advance directives are legal documents that state your wishes and plans for medical care. These plans are made ahead of time in case you lose your ability to make decisions for yourself. Advance directives can apply to any medical decision, such as the treatments you want, and if you want to donate organs.   What are the types of advance directives?  There are many types of advance directives, and each state has rules about how to use them. You may choose a combination of any of the  following:  Living will:  This is a written record of the treatment you want. You can also choose which treatments you do not want, which to limit, and which to stop at a certain time. This includes surgery, medicine, IV fluid, and tube feedings.   Durable power of  for healthcare (DPAHC):  This is a written record that states who you want to make healthcare choices for you when you are unable to make them for yourself. This person, called a proxy, is usually a family member or a friend. You may choose more than 1 proxy.  Do not resuscitate (DNR) order:  A DNR order is used in case your heart stops beating or you stop breathing. It is a request not to have certain forms of treatment, such as CPR. A DNR order may be included in other types of advance directives.  Medical directive:  This covers the care that you want if you are in a coma, near death, or unable to make decisions for yourself. You can list the treatments you want for each condition. Treatment may include pain medicine, surgery, blood transfusions, dialysis, IV or tube feedings, and a ventilator (breathing machine).  Values history:  This document has questions about your views, beliefs, and how you feel and think about life. This information can help others choose the care that you would choose.  Why are advance directives important?  An advance directive helps you control your care. Although spoken wishes may be used, it is better to have your wishes written down. Spoken wishes can be misunderstood, or not followed. Treatments may be given even if you do not want them. An advance directive may make it easier for your family to make difficult choices about your care.   Weight Management   Why it is important to manage your weight:  Being overweight increases your risk of health conditions such as heart disease, high blood pressure, type 2 diabetes, and certain types of cancer. It can also increase your risk for osteoarthritis, sleep apnea, and  other respiratory problems. Aim for a slow, steady weight loss. Even a small amount of weight loss can lower your risk of health problems.  How to lose weight safely:  A safe and healthy way to lose weight is to eat fewer calories and get regular exercise. You can lose up about 1 pound a week by decreasing the number of calories you eat by 500 calories each day.   Healthy meal plan for weight management:  A healthy meal plan includes a variety of foods, contains fewer calories, and helps you stay healthy. A healthy meal plan includes the following:  Eat whole-grain foods more often.  A healthy meal plan should contain fiber. Fiber is the part of grains, fruits, and vegetables that is not broken down by your body. Whole-grain foods are healthy and provide extra fiber in your diet. Some examples of whole-grain foods are whole-wheat breads and pastas, oatmeal, brown rice, and bulgur.  Eat a variety of vegetables every day.  Include dark, leafy greens such as spinach, kale, jesus greens, and mustard greens. Eat yellow and orange vegetables such as carrots, sweet potatoes, and winter squash.   Eat a variety of fruits every day.  Choose fresh or canned fruit (canned in its own juice or light syrup) instead of juice. Fruit juice has very little or no fiber.  Eat low-fat dairy foods.  Drink fat-free (skim) milk or 1% milk. Eat fat-free yogurt and low-fat cottage cheese. Try low-fat cheeses such as mozzarella and other reduced-fat cheeses.  Choose meat and other protein foods that are low in fat.  Choose beans or other legumes such as split peas or lentils. Choose fish, skinless poultry (chicken or turkey), or lean cuts of red meat (beef or pork). Before you cook meat or poultry, cut off any visible fat.   Use less fat and oil.  Try baking foods instead of frying them. Add less fat, such as margarine, sour cream, regular salad dressing and mayonnaise to foods. Eat fewer high-fat foods. Some examples of high-fat foods  include french fries, doughnuts, ice cream, and cakes.  Eat fewer sweets.  Limit foods and drinks that are high in sugar. This includes candy, cookies, regular soda, and sweetened drinks.  Exercise:  Exercise at least 30 minutes per day on most days of the week. Some examples of exercise include walking, biking, dancing, and swimming. You can also fit in more physical activity by taking the stairs instead of the elevator or parking farther away from stores. Ask your healthcare provider about the best exercise plan for you.      © Copyright [employer] 2018 Information is for End User's use only and may not be sold, redistributed or otherwise used for commercial purposes. All illustrations and images included in CareNotes® are the copyrighted property of [employer] or [employer]

## 2025-01-29 LAB
ALBUMIN SERPL-MCNC: 4.7 G/DL (ref 3.9–4.9)
ALP SERPL-CCNC: 73 IU/L (ref 44–121)
ALT SERPL-CCNC: 21 IU/L (ref 0–44)
AST SERPL-CCNC: 20 IU/L (ref 0–40)
BILIRUB SERPL-MCNC: 0.6 MG/DL (ref 0–1.2)
BUN SERPL-MCNC: 18 MG/DL (ref 8–27)
BUN/CREAT SERPL: 15 (ref 10–24)
CALCIUM SERPL-MCNC: 9.2 MG/DL (ref 8.6–10.2)
CHLORIDE SERPL-SCNC: 104 MMOL/L (ref 96–106)
CHOLEST SERPL-MCNC: 172 MG/DL (ref 100–199)
CO2 SERPL-SCNC: 23 MMOL/L (ref 20–29)
CREAT SERPL-MCNC: 1.22 MG/DL (ref 0.76–1.27)
EGFR: 67 ML/MIN/1.73
GLOBULIN SER-MCNC: 2 G/DL (ref 1.5–4.5)
GLUCOSE SERPL-MCNC: 95 MG/DL (ref 70–99)
HDLC SERPL-MCNC: 48 MG/DL
LDLC SERPL CALC-MCNC: 109 MG/DL (ref 0–99)
MICRODELETION SYND BLD/T FISH: NORMAL
POTASSIUM SERPL-SCNC: 4.4 MMOL/L (ref 3.5–5.2)
PROT SERPL-MCNC: 6.7 G/DL (ref 6–8.5)
SL AMB VLDL CHOLESTEROL CALC: 15 MG/DL (ref 5–40)
SODIUM SERPL-SCNC: 144 MMOL/L (ref 134–144)
TRIGL SERPL-MCNC: 82 MG/DL (ref 0–149)

## 2025-02-10 ENCOUNTER — RESULTS FOLLOW-UP (OUTPATIENT)
Dept: NEUROLOGY | Facility: CLINIC | Age: 65
End: 2025-02-10

## 2025-03-13 ENCOUNTER — TELEPHONE (OUTPATIENT)
Dept: GASTROENTEROLOGY | Facility: AMBULARY SURGERY CENTER | Age: 65
End: 2025-03-13

## 2025-03-13 NOTE — TELEPHONE ENCOUNTER
Patient returned call to office. He does not wish to proceed with Colonoscopy. He will contact office back if he changes his mind.

## 2025-03-17 DIAGNOSIS — G25.81 RESTLESS LEG SYNDROME: ICD-10-CM

## 2025-03-17 RX ORDER — ROPINIROLE 2 MG/1
2 TABLET, FILM COATED ORAL
Qty: 90 TABLET | Refills: 1 | Status: SHIPPED | OUTPATIENT
Start: 2025-03-17

## 2025-04-04 DIAGNOSIS — E78.00 HYPERCHOLESTEROLEMIA: ICD-10-CM

## 2025-04-04 RX ORDER — ROSUVASTATIN CALCIUM 10 MG/1
10 TABLET, COATED ORAL
Qty: 90 TABLET | Refills: 0 | OUTPATIENT
Start: 2025-04-04

## 2025-04-04 RX ORDER — ROSUVASTATIN CALCIUM 10 MG/1
10 TABLET, COATED ORAL DAILY
Qty: 90 TABLET | Refills: 1 | Status: SHIPPED | OUTPATIENT
Start: 2025-04-04

## 2025-06-06 DIAGNOSIS — S63.501A SPRAIN OF RIGHT WRIST, INITIAL ENCOUNTER: Primary | ICD-10-CM

## 2025-06-06 PROCEDURE — 99203 OFFICE O/P NEW LOW 30 MIN: CPT | Performed by: STUDENT IN AN ORGANIZED HEALTH CARE EDUCATION/TRAINING PROGRAM

## 2025-06-06 NOTE — PROGRESS NOTES
ORTHOPAEDIC HAND, WRIST, AND ELBOW OFFICE  VISIT     Name: Marc Douglas      : 1960      MRN: [id number]  Encounter Provider: Mickey Tijerina MD  Encounter Date: 2025   Encounter department: [facility] [place]  :  Assessment & Plan  Sprain of right wrist, initial encounter    Orders:    Brace      Assessment & Plan             ASSESSMENT/PLAN:    Marc Douglas is a 64 y.o. RHD male who presents with Right wrist sprain    Reviewed X-rays and clinical findings with the patient: no acute fracture or dislocation is evident. He has focal tenderness over the dorsum of the hand at the site of his crush injury but no instability on examination. Mild discomfort occurs only at extremes of wrist flexion.    Recommend period of rest, ice, and immobilization in a universal wrist brace full-time for two weeks, then gradually wean.    Anticipate up to six weeks of immobilization total, with gradual return to activities as tolerated.    Advised the patient to return sooner if pain or discomfort worsens; otherwise, follow up in six weeks for repeat X-rays and clinical evaluation.          Follow Up:  6 weeks repeat xray and clinical evaluation    ____________________________________________________________________________________________________________________________________________      CHIEF COMPLAINT:  Right wrist pain    SUBJECTIVE:  Marc Douglas is a 64 y.o. male who presents with roughly 3-day history of right wrist pain after some punched the dorsal aspect of his right hand.  He initially went to the emergency room which x-rays obtained did not demonstrate a fracture.  He was placed in a splint and told to obtain orthopedic follow-up.  Presents today to establish care.  He took his splint off last night due to discomfort from the splint but otherwise says the pain has been persistent.  He denies numbness or tingling to extremity.  He has been able to  "move his wrist with some discomfort but otherwise been stable.  Denies pain or injury elsewhere.    I have personally reviewed all the relevant PMH, PSH, SH, FH, Medications and allergies      PAST MEDICAL HISTORY:  Past Medical History[1]    PAST SURGICAL HISTORY:  Past Surgical History[2]    FAMILY HISTORY:  Family History[3]    SOCIAL HISTORY:  Social History[4]    MEDICATIONS:  Current Medications[5]    ALLERGIES:  Allergies[6]      REVIEW OF SYSTEMS:  Pertinent items are noted in HPI.  A comprehensive review of systems was negative.    VITALS:  There were no vitals filed for this visit.    LABS:  HgA1c: No results found for: \"HGBA1C\"  BMP:   Lab Results   Component Value Date    CALCIUM 8.9 02/08/2024    K 4.4 01/28/2025    CO2 23 01/28/2025     01/28/2025    BUN 18 01/28/2025    CREATININE 1.22 01/28/2025       _____________________________________________________  PHYSICAL EXAMINATION:  General: well developed and well nourished, alert, oriented times 3, and appears comfortable  Psychiatric: Normal  HEENT: Normocephalic, Atraumatic Trachea Midline, No torticollis  Pulmonary: No audible wheezing or respiratory distress   Abdomen/GI: Non tender, non distended   Cardiovascular: Regular Rate and Rhythm. No pitting edema, 2+ radial pulse   Skin: No masses, erythema, lacerations, fluctation, ulcerations  Neurovascular: Sensation Intact to the Median, Ulnar, Radial Nerve, Motor Intact to the Median, Ulnar, Radial Nerve, and Pulses Intact  Musculoskeletal: Normal, except as noted in detailed exam and in HPI.        FOCUSED MUSCULOSKELETAL EXAMINATION:  Right Upper Extremity  Inspection: Mild swelling of the right hand skin intact  Palpation: Tenderness palpation about the dorsum of right hand no palpable crepitus  Neurologic: 5/5 elbow flexion, 5/5 elbow extension, 4/5 wrist extension, 4/5 wrist flexion, 5/5 finger flexion, 5/5 finger extension, 5/5 FPL, 5/5 EPL, 5/5 APB, 5/5 intrinsics, sensation intact to " "median, radial, and ulnar nerve distributions  Vascular: Palpable radial pulse, brisk cap refill <2sec, hand warm and well perfused  MSK:   No instability on exam, mild pain with extremes of wrist flexion.  ___________________________________________________  STUDIES REVIEWED:  Xrays of the left hand and wrist were obtained on 6/3/25 were independently reviewed which demonstrates no acute fracture or dislocation    LABS REVIEWED:    HgA1c: No results found for: \"HGBA1C\"  BMP:   Lab Results   Component Value Date    CALCIUM 8.9 02/08/2024    K 4.4 01/28/2025    CO2 23 01/28/2025     01/28/2025    BUN 18 01/28/2025    CREATININE 1.22 01/28/2025               PROCEDURES PERFORMED:      _____________________________________________________      I agree with the history, physical examination, assessment and plan of care as documented above.    Mickey Tijerina M.D.  Attending, Orthopaedic Surgery  Hand, Wrist, and Elbow Surgery  [facility]           [1]   Past Medical History:  Diagnosis Date    Allergic     Allergic rhinitis     Anxiety     Asthma     COPD (chronic obstructive pulmonary disease) (HCC)     Depression     GERD (gastroesophageal reflux disease)     Hyperlipidemia     Nasal polyps     Near syncope     Sinusitis     Sleep apnea, obstructive    [2]   Past Surgical History:  Procedure Laterality Date    ARTHROSCOPY KNEE      COLONOSCOPY      NE SURGICAL ARTHROSCOPY SHOULDER REMOVAL LOOSE/FB Right 12/6/2018    Procedure: ARTHROSCOPIC SHOULDER;  Surgeon: Mickey Gaytan MD;  Location: [facility];  Service: Orthopedics    NE SURGICAL ARTHROSCOPY SHOULDER W/ROTATOR CUFF RPR Right 12/6/2018    Procedure: REPAIR ROTATOR CUFF  ARTHROSCOPIC WITH POSSIBLE REMOVAL LOOSE BODIES AND POSSIBLE DECOMPRESSION;  Surgeon: Mickey Gaytan MD;  Location: [facility];  Service: Orthopedics    SINUS SURGERY     [3]   Family History  Problem Relation Name Age of Onset    Dementia Mother      Kidney " disease Mother      Dementia Father      Rheumatologic disease Father      Other Father          rheumatism    No Known Problems Sister      No Known Problems Brother      No Known Problems Maternal Aunt      Vision loss Maternal Uncle      No Known Problems Paternal Aunt      No Known Problems Paternal Uncle      No Known Problems Maternal Grandmother      No Known Problems Maternal Grandfather      No Known Problems Paternal Grandmother      No Known Problems Paternal Grandfather      ADD / ADHD Neg Hx      Anesthesia problems Neg Hx      Cancer Neg Hx      Clotting disorder Neg Hx      Collagen disease Neg Hx      Diabetes Neg Hx      Dislocations Neg Hx      Learning disabilities Neg Hx      Neurological problems Neg Hx      Osteoporosis Neg Hx      Scoliosis Neg Hx      Vascular Disease Neg Hx     [4]   Social History  Tobacco Use    Smoking status: Former     Current packs/day: 0.00     Average packs/day: 0.5 packs/day for 15.0 years (7.5 ttl pk-yrs)     Types: Cigarettes     Start date:      Quit date:      Years since quittin.4    Smokeless tobacco: Never    Tobacco comments:     smoked 3-4 days    Vaping Use    Vaping status: Never Used   Substance Use Topics    Alcohol use: No    Drug use: No   [5]   Current Outpatient Medications:     buPROPion (WELLBUTRIN XL) 150 mg 24 hr tablet, Take 150 mg by mouth every morning, Disp: , Rfl:     buPROPion (WELLBUTRIN XL) 300 mg 24 hr tablet, Take 300 mg by mouth every morning, Disp: , Rfl:     busPIRone (BUSPAR) 15 mg tablet, , Disp: , Rfl:     dupilumab (DUPIXENT) subcutaneous injection, Inject 2 mL (300 mg total) under the skin every 14 (fourteen) days, Disp: 4 mL, Rfl: 5    dutasteride (AVODART) 0.5 mg capsule, Take 1 capsule by mouth in the morning, Disp: , Rfl:     esomeprazole (NexIUM) 20 mg capsule, Take 1 capsule (20 mg total) by mouth in the morning, Disp: 180 capsule, Rfl: 1    Fluticasone Propionate (Xhance) 93 MCG/ACT EXHU, 2 sprays into each  nostril 2 (two) times a day, Disp: 32 mL, Rfl: 5    ibuprofen (MOTRIN) 600 mg tablet, Take 1 tablet (600 mg total) by mouth every 6 (six) hours as needed for moderate pain, Disp: 30 tablet, Rfl: 0    minoxidil (LONITEN) 2.5 mg tablet, in the morning and in the evening., Disp: , Rfl:     montelukast (SINGULAIR) 10 mg tablet, Take 1 tablet (10 mg total) by mouth every morning, Disp: 90 tablet, Rfl: 0    Multiple Vitamin (MULTIVITAMIN) tablet, Take 1 tablet by mouth in the morning., Disp: , Rfl:     rOPINIRole (REQUIP) 2 mg tablet, TAKE ONE TABLET BY MOUTH AT BEDTIME, Disp: 90 tablet, Rfl: 1    rosuvastatin (CRESTOR) 10 MG tablet, TAKE ONE TABLET BY MOUTH DAILY, Disp: 90 tablet, Rfl: 1    Testosterone 1.62 % GEL, , Disp: , Rfl:     traZODone (DESYREL) 100 mg tablet, Take 100 mg by mouth daily at bedtime, Disp: , Rfl:     Ventolin  (90 Base) MCG/ACT inhaler, Inhale 2 puffs every 4 (four) hours as needed for wheezing or shortness of breath FOUR Inahlers and 3 refills, Disp: 54 g, Rfl: 3    fluticasone-umeclidinium-vilanterol (Trelegy Ellipta) 100-62.5-25 mcg/actuation inhaler, Inhale 1 puff daily Rinse mouth after use., Disp: 60 blister, Rfl: 7    omeprazole (PriLOSEC) 40 MG capsule, Take 1 capsule (40 mg total) by mouth daily before breakfast, Disp: 90 capsule, Rfl: 3  [6] No Known Allergies

## 2025-06-26 DIAGNOSIS — E78.00 HYPERCHOLESTEROLEMIA: ICD-10-CM

## 2025-06-27 RX ORDER — ROSUVASTATIN CALCIUM 10 MG/1
10 TABLET, COATED ORAL DAILY
Qty: 90 TABLET | Refills: 0 | Status: SHIPPED | OUTPATIENT
Start: 2025-06-27

## 2025-07-02 DIAGNOSIS — E78.00 HYPERCHOLESTEROLEMIA: ICD-10-CM

## 2025-07-03 RX ORDER — ROSUVASTATIN CALCIUM 10 MG/1
10 TABLET, COATED ORAL DAILY
Qty: 90 TABLET | Refills: 0 | Status: SHIPPED | OUTPATIENT
Start: 2025-07-03

## (undated) DEVICE — SCD SEQUENTIAL COMPRESSION COMFORT SLEEVE MEDIUM KNEE LENGTH: Brand: KENDALL SCD

## (undated) DEVICE — NEEDLE SUT SCORPION MULTIFIRE

## (undated) DEVICE — BURR  OVAL 4MM 13CM 8 FLUTE COOLCUT

## (undated) DEVICE — GLOVE SRG BIOGEL 6.5

## (undated) DEVICE — DUAL SPIKE ADAPTER: Brand: CONMED

## (undated) DEVICE — SPONGE GAUZE 4 X 8 12 PLY STRL LF

## (undated) DEVICE — TIBURON BEACH CHAIR SHOULDER DRAPE: Brand: CONVERTORS

## (undated) DEVICE — GLOVE SRG BIOGEL 7.5

## (undated) DEVICE — PACK ARTHROSCOPY

## (undated) DEVICE — 3M™ TEGADERM™ TRANSPARENT FILM DRESSING FRAME STYLE, 1626W, 4 IN X 4-3/4 IN (10 CM X 12 CM), 50/CT 4CT/CASE: Brand: 3M™ TEGADERM™

## (undated) DEVICE — CANNULA 5.75 X 70MM BARREL SHAPED BOWL

## (undated) DEVICE — INTENDED FOR TISSUE SEPARATION, AND OTHER PROCEDURES THAT REQUIRE A SHARP SURGICAL BLADE TO PUNCTURE OR CUT.: Brand: BARD-PARKER SAFETY BLADES SIZE 11, STERILE

## (undated) DEVICE — TUBING SUCTION 5MM X 12 FT

## (undated) DEVICE — BLADE SHAVER EXCALIBUR 4MM 13CM COOLCUT

## (undated) DEVICE — Device

## (undated) DEVICE — ASTOUND IMPERVIOUS SURGICAL GOWN: Brand: CONVERTORS

## (undated) DEVICE — TUBING ARTHROSCOPIC WAVE  MAIN PUMP

## (undated) DEVICE — CANNULA BUTTON 8 X 30MM PASSPORT

## (undated) DEVICE — POSITIONER TRIMANO LIMB BEACH CHAIR

## (undated) DEVICE — GLOVE INDICATOR PI UNDERGLOVE SZ 6.5 BLUE

## (undated) DEVICE — LABEL MEDICATION STERILE 2 YELLOW LIDOCAINE 2 BLUE MARCAINE 2 ORANGE HEPARIN

## (undated) DEVICE — CHLORAPREP HI-LITE 26ML ORANGE

## (undated) DEVICE — OCCLUSIVE GAUZE STRIP,3% BISMUTH TRIBROMOPHENATE IN PETROLATUM BLEND: Brand: XEROFORM

## (undated) DEVICE — GLOVE INDICATOR PI UNDERGLOVE SZ 7.5 BLUE

## (undated) DEVICE — SYRINGE 50ML LL